# Patient Record
Sex: MALE | Race: OTHER | HISPANIC OR LATINO | Employment: FULL TIME | URBAN - METROPOLITAN AREA
[De-identification: names, ages, dates, MRNs, and addresses within clinical notes are randomized per-mention and may not be internally consistent; named-entity substitution may affect disease eponyms.]

---

## 2018-09-30 ENCOUNTER — HOSPITAL ENCOUNTER (EMERGENCY)
Facility: HOSPITAL | Age: 48
Discharge: HOME | End: 2018-10-01
Attending: EMERGENCY MEDICINE | Admitting: EMERGENCY MEDICINE
Payer: COMMERCIAL

## 2018-09-30 VITALS
SYSTOLIC BLOOD PRESSURE: 177 MMHG | RESPIRATION RATE: 19 BRPM | OXYGEN SATURATION: 97 % | HEART RATE: 88 BPM | DIASTOLIC BLOOD PRESSURE: 81 MMHG | TEMPERATURE: 98.7 F

## 2018-09-30 DIAGNOSIS — G56.20 CUBITAL TUNNEL SYNDROME: Primary | ICD-10-CM

## 2018-09-30 PROCEDURE — 99284 EMERGENCY DEPT VISIT MOD MDM: CPT

## 2018-10-01 ENCOUNTER — APPOINTMENT (EMERGENCY)
Dept: CT IMAGING | Facility: HOSPITAL | Age: 48
End: 2018-10-01
Payer: COMMERCIAL

## 2018-10-01 ENCOUNTER — APPOINTMENT (EMERGENCY)
Dept: RADIOLOGY | Facility: HOSPITAL | Age: 48
End: 2018-10-01
Payer: COMMERCIAL

## 2018-10-01 LAB
ALBUMIN SERPL BCP-MCNC: 3.8 G/DL (ref 3.5–5)
ALP SERPL-CCNC: 82 U/L (ref 46–116)
ALT SERPL W P-5'-P-CCNC: 28 U/L (ref 12–78)
ANION GAP SERPL CALCULATED.3IONS-SCNC: 10 MMOL/L (ref 4–13)
AST SERPL W P-5'-P-CCNC: 19 U/L (ref 5–45)
ATRIAL RATE: 70 BPM
BASOPHILS # BLD AUTO: 0.04 THOUSANDS/ΜL (ref 0–0.1)
BASOPHILS NFR BLD AUTO: 0 % (ref 0–1)
BILIRUB SERPL-MCNC: 0.3 MG/DL (ref 0.2–1)
BUN SERPL-MCNC: 15 MG/DL (ref 5–25)
CALCIUM SERPL-MCNC: 8.7 MG/DL (ref 8.3–10.1)
CHLORIDE SERPL-SCNC: 99 MMOL/L (ref 100–108)
CO2 SERPL-SCNC: 27 MMOL/L (ref 21–32)
CREAT SERPL-MCNC: 0.93 MG/DL (ref 0.6–1.3)
EOSINOPHIL # BLD AUTO: 0.11 THOUSAND/ΜL (ref 0–0.61)
EOSINOPHIL NFR BLD AUTO: 1 % (ref 0–6)
ERYTHROCYTE [DISTWIDTH] IN BLOOD BY AUTOMATED COUNT: 12.6 % (ref 11.6–15.1)
GFR SERPL CREATININE-BSD FRML MDRD: 96 ML/MIN/1.73SQ M
GLUCOSE SERPL-MCNC: 129 MG/DL (ref 65–140)
HCT VFR BLD AUTO: 45.5 % (ref 36.5–49.3)
HGB BLD-MCNC: 15.8 G/DL (ref 12–17)
INR PPP: 1.08 (ref 0.86–1.17)
LYMPHOCYTES # BLD AUTO: 2.47 THOUSANDS/ΜL (ref 0.6–4.47)
LYMPHOCYTES NFR BLD AUTO: 13 % (ref 14–44)
MCH RBC QN AUTO: 28.5 PG (ref 26.8–34.3)
MCHC RBC AUTO-ENTMCNC: 34.7 G/DL (ref 31.4–37.4)
MCV RBC AUTO: 82 FL (ref 82–98)
MONOCYTES # BLD AUTO: 1.18 THOUSAND/ΜL (ref 0.17–1.22)
MONOCYTES NFR BLD AUTO: 6 % (ref 4–12)
NEUTROPHILS # BLD AUTO: 15.02 THOUSANDS/ΜL (ref 1.85–7.62)
NEUTS SEG NFR BLD AUTO: 80 % (ref 43–75)
P AXIS: 50 DEGREES
PLATELET # BLD AUTO: 405 THOUSANDS/UL (ref 149–390)
PMV BLD AUTO: 9.4 FL (ref 8.9–12.7)
POTASSIUM SERPL-SCNC: 3.9 MMOL/L (ref 3.5–5.3)
PR INTERVAL: 146 MS
PROT SERPL-MCNC: 8.5 G/DL (ref 6.4–8.2)
PROTHROMBIN TIME: 13.9 SECONDS (ref 11.8–14.2)
QRS AXIS: 33 DEGREES
QRSD INTERVAL: 76 MS
QT INTERVAL: 372 MS
QTC INTERVAL: 401 MS
RBC # BLD AUTO: 5.54 MILLION/UL (ref 3.88–5.62)
SODIUM SERPL-SCNC: 136 MMOL/L (ref 136–145)
T WAVE AXIS: 44 DEGREES
TROPONIN I SERPL-MCNC: <0.02 NG/ML
TSH SERPL DL<=0.05 MIU/L-ACNC: 2.63 UIU/ML (ref 0.36–3.74)
VENTRICULAR RATE: 70 BPM
WBC # BLD AUTO: 18.82 THOUSAND/UL (ref 4.31–10.16)

## 2018-10-01 PROCEDURE — 70498 CT ANGIOGRAPHY NECK: CPT

## 2018-10-01 PROCEDURE — 73080 X-RAY EXAM OF ELBOW: CPT

## 2018-10-01 PROCEDURE — 84443 ASSAY THYROID STIM HORMONE: CPT | Performed by: EMERGENCY MEDICINE

## 2018-10-01 PROCEDURE — 93010 ELECTROCARDIOGRAM REPORT: CPT | Performed by: INTERNAL MEDICINE

## 2018-10-01 PROCEDURE — 85025 COMPLETE CBC W/AUTO DIFF WBC: CPT | Performed by: EMERGENCY MEDICINE

## 2018-10-01 PROCEDURE — 70496 CT ANGIOGRAPHY HEAD: CPT

## 2018-10-01 PROCEDURE — 86617 LYME DISEASE ANTIBODY: CPT | Performed by: EMERGENCY MEDICINE

## 2018-10-01 PROCEDURE — 36415 COLL VENOUS BLD VENIPUNCTURE: CPT | Performed by: EMERGENCY MEDICINE

## 2018-10-01 PROCEDURE — 84484 ASSAY OF TROPONIN QUANT: CPT | Performed by: EMERGENCY MEDICINE

## 2018-10-01 PROCEDURE — 80053 COMPREHEN METABOLIC PANEL: CPT | Performed by: EMERGENCY MEDICINE

## 2018-10-01 PROCEDURE — 85610 PROTHROMBIN TIME: CPT | Performed by: EMERGENCY MEDICINE

## 2018-10-01 PROCEDURE — 93005 ELECTROCARDIOGRAM TRACING: CPT

## 2018-10-01 RX ORDER — CLINDAMYCIN HYDROCHLORIDE 300 MG/1
300 CAPSULE ORAL 4 TIMES DAILY
Qty: 40 CAPSULE | Refills: 0 | Status: SHIPPED | OUTPATIENT
Start: 2018-10-01 | End: 2018-10-11

## 2018-10-01 RX ORDER — PREDNISONE 20 MG/1
60 TABLET ORAL ONCE
Status: COMPLETED | OUTPATIENT
Start: 2018-10-01 | End: 2018-10-01

## 2018-10-01 RX ORDER — CLINDAMYCIN HYDROCHLORIDE 150 MG/1
300 CAPSULE ORAL ONCE
Status: COMPLETED | OUTPATIENT
Start: 2018-10-01 | End: 2018-10-01

## 2018-10-01 RX ORDER — PREDNISONE 20 MG/1
60 TABLET ORAL DAILY
Qty: 15 TABLET | Refills: 0 | Status: SHIPPED | OUTPATIENT
Start: 2018-10-01 | End: 2018-11-06

## 2018-10-01 RX ORDER — METHOCARBAMOL 500 MG/1
1000 TABLET, FILM COATED ORAL ONCE
Status: COMPLETED | OUTPATIENT
Start: 2018-10-01 | End: 2018-10-01

## 2018-10-01 RX ORDER — METHOCARBAMOL 500 MG/1
1000 TABLET, FILM COATED ORAL 2 TIMES DAILY
Qty: 30 TABLET | Refills: 0 | Status: SHIPPED | OUTPATIENT
Start: 2018-10-01 | End: 2018-11-06

## 2018-10-01 RX ADMIN — CLINDAMYCIN HYDROCHLORIDE 300 MG: 150 CAPSULE ORAL at 03:37

## 2018-10-01 RX ADMIN — PREDNISONE 60 MG: 20 TABLET ORAL at 03:27

## 2018-10-01 RX ADMIN — METHOCARBAMOL 1000 MG: 500 TABLET ORAL at 03:26

## 2018-10-01 RX ADMIN — IOHEXOL 100 ML: 350 INJECTION, SOLUTION INTRAVENOUS at 01:16

## 2018-10-01 NOTE — DISCHARGE INSTRUCTIONS
Cubital Tunnel Syndrome   WHAT YOU NEED TO KNOW:   Cubital tunnel syndrome is a condition where there is increased pressure on the ulnar nerve in your elbow  The ulnar nerve controls muscles and feeling in the hand  Cubital tunnel syndrome may be caused by direct pressure, stretching, or decreased blood flow to the ulnar nerve  DISCHARGE INSTRUCTIONS:   Medicines:   · NSAIDs:  These medicines decrease swelling and pain  NSAIDs are available without a doctor's order  Ask which medicine is right for you and how much to take  Take as directed  NSAIDs can cause stomach bleeding or kidney problems if not taken correctly  · Take your medicine as directed  Contact your healthcare provider if you think your medicine is not helping or if you have side effects  Tell him of her if you are allergic to any medicine  Keep a list of the medicines, vitamins, and herbs you take  Include the amounts, and when and why you take them  Bring the list or the pill bottles to follow-up visits  Carry your medicine list with you in case of an emergency  Follow up with your healthcare provider as directed:  Write down your questions so you remember to ask them during your visits  Manage your symptoms:   · Avoid putting pressure on your elbow:  Certain positions put pressure on the ulnar nerve in your elbow  Leaning or sleeping on your bent elbow can make your symptoms worse  · Apply ice:  Ice helps decrease swelling and pain  Ice may also help prevent tissue damage  Use an ice pack or put crushed ice in a plastic bag  Cover the ice pack with a towel and place it on the area for 15 to 20 minutes every hour  · Rest your arm:  You may need to rest your injured arm and avoid activities that cause your symptoms to allow your nerve to heal     · Get physical therapy:  A physical therapist can show you exercises to help improve movement and strength  Physical therapy can also help decrease pain and loss of function      · Use elbow splint or brace: You may need a brace or splint on your elbow to decrease your arm movement  This will help to keep pressure off your ulnar nerve  You may also need elbow pads to protect your elbow  Contact your healthcare provider if:   · Your symptoms get worse  · Your hand and fingers are so weak that you cannot grab, squeeze, or lift items  · You have questions or concerns about your condition or care  Return to the emergency department if:   · You suddenly lose feeling in your hand or fingers  · You cannot move your ring or little finger  © 2017 [facility] Information is for End User's use only and may not be sold, redistributed or otherwise used for commercial purposes  All illustrations and images included in CareNotes® are the copyrighted property of [employer]  or Navneet Garcia  The above information is an  only  It is not intended as medical advice for individual conditions or treatments  Talk to your doctor, nurse or pharmacist before following any medical regimen to see if it is safe and effective for you

## 2018-10-01 NOTE — ED PROVIDER NOTES
History  Chief Complaint   Patient presents with    Arm Pain     pt co of R arm pain "i woke up with arm pain on saturday morning" no injury  41-year-old male patient, working as a , presents to the emergency department for evaluation of right arm pain  Patient states arm pain  Patient has tenderness over the right elbow  There is no joint effusion, there is no overlying cellulitis, there is no known numbness, there is some tingling in the extremity  Patient is afebrile, has normal vital signs, is otherwise well-appearing  Of note, however, on physical exam the patient does have a mild right facial droop or facial asymmetry  The patient's wife 20 years states that she is not sure if this is new or old  She states looking at him that she was unaware that that was there and does notice that now  The patient has no other findings neurologically, has an NIH stroke scale of one based on this  The patient's symptoms started a week ago  The patient has tenderness over the elbow, has no joint effusion which is palpable, has no surrounding cellulitis  Patient does not examine as a septic joint  CT scan of the head and neck will be done because of the facial droop my concern at this elbow finding which she examines like cubital tunnel syndrome may be overlying another cause of right hand weakness  CT scan found no acute abnormalities indicative of a previous stroke  By this time, with the duration of symptoms, I would expect that would have been found there  The patient does live in Lyme endemic area and because of this a Lyme titer was done  Patient has an elevated white count of [id number]  The CT scan does show some dental infection, it also shows a soft tissue mass in the chest which I did speak to the patient about  The patient had no primary care follow-up so he was given the name of a [address] [place] [address] provider who is accepting patients    I explained to him the importance of following up with them for these findings as they may indicate other disease processes such as cancer  The patient will be started on Robaxin and prednisone for the cubital tunnel syndrome and clindamycin for the dental infection  History provided by:  Patient   used: No    Elbow Pain   Location:  Elbow  Elbow location:  R elbow  Injury: no    Pain details:     Quality:  Aching    Radiates to:  Does not radiate    Severity:  Moderate    Onset quality:  Gradual    Timing:  Constant    Progression:  Worsening  Handedness:  Right-handed  Dislocation: no    Foreign body present:  No foreign bodies  Prior injury to area:  No  Relieved by:  Nothing  Worsened by:  Nothing  Ineffective treatments:  None tried      None       History reviewed  No pertinent past medical history  History reviewed  No pertinent surgical history  History reviewed  No pertinent family history  I have reviewed and agree with the history as documented  Social History   Substance Use Topics    Smoking status: Current Every Day Smoker     Packs/day: 0 50     Types: Cigarettes    Smokeless tobacco: Never Used    Alcohol use Yes        Review of Systems   All other systems reviewed and are negative  Physical Exam  Physical Exam   Constitutional: He is oriented to person, place, and time  He appears well-developed and well-nourished  No distress  HENT:   Head: Normocephalic and atraumatic  Right Ear: External ear normal    Left Ear: External ear normal    Eyes: Conjunctivae and EOM are normal  Right eye exhibits no discharge  Left eye exhibits no discharge  No scleral icterus  Neck: Normal range of motion  Neck supple  No JVD present  No tracheal deviation present  No thyromegaly present  Cardiovascular: Normal rate and regular rhythm  Pulmonary/Chest: Effort normal and breath sounds normal  No stridor  No respiratory distress  He has no wheezes  He has no rales  Abdominal: Soft   Bowel sounds are normal  He exhibits no distension  There is no tenderness  Musculoskeletal: He exhibits no edema or deformity  Right elbow: He exhibits decreased range of motion and swelling  He exhibits no effusion, no deformity and no laceration  Tenderness found  Neurological: He is alert and oriented to person, place, and time  A cranial nerve deficit (as noted  pt not sure if old or new, neither is his wife  pt has no other abnormalities on physical exam ) is present  Coordination normal  GCS eye subscore is 4  GCS verbal subscore is 5  GCS motor subscore is 6  Skin: Skin is warm and dry  He is not diaphoretic  Psychiatric: He has a normal mood and affect  His behavior is normal    Nursing note and vitals reviewed        Vital Signs  ED Triage Vitals [09/30/18 2239]   Temperature Pulse Respirations Blood Pressure SpO2   98 7 °F (37 1 °C) 88 19 (!) 177/81 97 %      Temp Source Heart Rate Source Patient Position - Orthostatic VS BP Location FiO2 (%)   Oral Monitor Sitting Right arm --      Pain Score       Worst Possible Pain           Vitals:    09/30/18 2239   BP: (!) 177/81   Pulse: 88   Patient Position - Orthostatic VS: Sitting       Visual Acuity  Visual Acuity      Most Recent Value   L Pupil Size (mm)  3   R Pupil Size (mm)  3          ED Medications  Medications   iohexol (OMNIPAQUE) 350 MG/ML injection (MULTI-DOSE) 100 mL (100 mL Intravenous Given 10/1/18 0116)   methocarbamol (ROBAXIN) tablet 1,000 mg (1,000 mg Oral Given 10/1/18 0326)   predniSONE tablet 60 mg (60 mg Oral Given 10/1/18 0327)   clindamycin (CLEOCIN) capsule 300 mg (300 mg Oral Given 10/1/18 0337)       Diagnostic Studies  Results Reviewed     Procedure Component Value Units Date/Time    Troponin I [[id number]]  (Normal) Collected:  10/01/18 0017    Lab Status:  Final result Specimen:  Blood from Arm, Left Updated:  10/01/18 0057     Troponin I <0 02 ng/mL     TSH, 3rd generation with Free T4 reflex [[id number]]  (Normal) Collected:  10/01/18 0017 Lab Status:  Final result Specimen:  Blood from Arm, Right Updated:  10/01/18 0055     TSH 3RD GENERATON 2 629 uIU/mL     Narrative:         Patients undergoing fluorescein dye angiography may retain small amounts of fluorescein in the body for 48-72 hours post procedure  Samples containing fluorescein can produce falsely depressed TSH values  If the patient had this procedure,a specimen should be resubmitted post fluorescein clearance  Comprehensive metabolic panel [[id number]]  (Abnormal) Collected:  10/01/18 0017    Lab Status:  Final result Specimen:  Blood from Arm, Right Updated:  10/01/18 0047     Sodium 136 mmol/L      Potassium 3 9 mmol/L      Chloride 99 (L) mmol/L      CO2 27 mmol/L      ANION GAP 10 mmol/L      BUN 15 mg/dL      Creatinine 0 93 mg/dL      Glucose 129 mg/dL      Calcium 8 7 mg/dL      AST 19 U/L      ALT 28 U/L      Alkaline Phosphatase 82 U/L      Total Protein 8 5 (H) g/dL      Albumin 3 8 g/dL      Total Bilirubin 0 30 mg/dL      eGFR 96 ml/min/1 73sq m     Narrative:         National Kidney Disease Education Program recommendations are as follows:  GFR calculation is accurate only with a steady state creatinine  Chronic Kidney disease less than 60 ml/min/1 73 sq  meters  Kidney failure less than 15 ml/min/1 73 sq  meters  Protime-INR [[id number]]  (Normal) Collected:  10/01/18 0017    Lab Status:  Final result Specimen:  Blood from Arm, Right Updated:  10/01/18 0042     Protime 13 9 seconds      INR 1 08    Lyme disease, western blot [[id number]] Collected:  10/01/18 0036    Lab Status:   In process Specimen:  Blood from Arm, Left Updated:  10/01/18 0039    CBC and differential [[id number]]  (Abnormal) Collected:  10/01/18 0017    Lab Status:  Final result Specimen:  Blood from Arm, Right Updated:  10/01/18 0031     WBC 18 82 (H) Thousand/uL      RBC 5 54 Million/uL      Hemoglobin 15 8 g/dL      Hematocrit 45 5 %      MCV 82 fL      MCH 28 5 pg      MCHC 34 7 g/dL      RDW 12 6 % MPV 9 4 fL      Platelets 589 (H) Thousands/uL      Neutrophils Relative 80 (H) %      Lymphocytes Relative 13 (L) %      Monocytes Relative 6 %      Eosinophils Relative 1 %      Basophils Relative 0 %      Neutrophils Absolute 15 02 (H) Thousands/µL      Lymphocytes Absolute 2 47 Thousands/µL      Monocytes Absolute 1 18 Thousand/µL      Eosinophils Absolute 0 11 Thousand/µL      Basophils Absolute 0 04 Thousands/µL                  CTA head and neck with and without contrast   Final Result by Maryann Hernandez MD (10/01 0303)      1  No evidence of acute intracranial hemorrhage  2   Mild atherosclerosis, as described above  No evidence of hemodynamically significant stenosis  No evidence of aneurysm or AVM  3   There is an approximately 0 9 x 0 7 cm soft tissue density structure within the posterior aspect of the right mainstem bronchus, just beyond the marisa  Although this could be due to mucous or secretions, a soft tissue nodule should be excluded  Follow-up is suggested  4   There is some peripheral subpleural honeycombing and interstitial thickening laterally at the right lung apex  Nonemergent outpatient pulmonology consultation/follow-up is recommended  5   There is dental and periodontal disease  There is an approximately 2 cm lucent lesion with a faint sclerotic margin involving the anterior maxilla, slightly to the right of midline  Nonemergent outpatient dental consultation/follow-up is    recommended           Workstation performed: [id number]         XR elbow 3+ vw RIGHT    (Results Pending)              Procedures  Procedures       Phone Contacts  ED Phone Contact    ED Course                               MDM  Number of Diagnoses or Management Options  Cubital tunnel syndrome: new and requires workup     Amount and/or Complexity of Data Reviewed  Clinical lab tests: reviewed and ordered  Tests in the radiology section of CPT®: ordered and reviewed  Decide to obtain previous medical records or to obtain history from someone other than the patient: yes  Review and summarize past medical records: yes    Patient Progress  Patient progress: stable    CritCare Time    Disposition  Final diagnoses:   Cubital tunnel syndrome     Time reflects when diagnosis was documented in both MDM as applicable and the Disposition within this note     Time User Action Codes Description Comment    10/1/2018  3:22 AM Bel Ruiz Add [G56 20] Cubital tunnel syndrome       ED Disposition     ED Disposition Condition Comment    Discharge  Osmin Simonsin discharge to home/self care  Condition at discharge: Stable        Follow-up Information     Follow up With Specialties Details Why Contact Info Additional Information    [facility] Emergency Medicine  As needed [facility]  [id number] [place] ED, [address], [place], [address] [place] [address], [address] [place] [address], Nurse Practitioner  Discuss CT scan finding of pulmonary abnormalities and Dental disease  [address]             Discharge Medication List as of 10/1/2018  3:23 AM      START taking these medications    Details   methocarbamol (ROBAXIN) 500 mg tablet Take 2 tablets (1,000 mg total) by mouth 2 (two) times a day, Starting Mon 10/1/2018, Print      predniSONE 20 mg tablet Take 3 tablets (60 mg total) by mouth daily, Starting Mon 10/1/2018, Print             Outpatient Discharge Orders  Ambulatory referral to Physical Therapy   Standing Status: Future  Standing Exp   Date: 04/01/19         ED Provider  Electronically Signed by           Jessica Howard DO  10/01/18 3929

## 2018-10-02 LAB
B BURGDOR IGG PATRN SER IB-IMP: NEGATIVE
B BURGDOR IGM PATRN SER IB-IMP: POSITIVE
B BURGDOR18KD IGG SER QL IB: ABNORMAL
B BURGDOR23KD IGG SER QL IB: ABNORMAL
B BURGDOR23KD IGM SER QL IB: PRESENT
B BURGDOR28KD IGG SER QL IB: ABNORMAL
B BURGDOR30KD IGG SER QL IB: ABNORMAL
B BURGDOR39KD IGG SER QL IB: ABNORMAL
B BURGDOR39KD IGM SER QL IB: PRESENT
B BURGDOR41KD IGG SER QL IB: ABNORMAL
B BURGDOR41KD IGM SER QL IB: PRESENT
B BURGDOR45KD IGG SER QL IB: ABNORMAL
B BURGDOR58KD IGG SER QL IB: ABNORMAL
B BURGDOR66KD IGG SER QL IB: ABNORMAL
B BURGDOR93KD IGG SER QL IB: ABNORMAL

## 2018-10-03 ENCOUNTER — EVALUATION (OUTPATIENT)
Dept: OCCUPATIONAL THERAPY | Facility: CLINIC | Age: 48
End: 2018-10-03
Payer: COMMERCIAL

## 2018-10-03 ENCOUNTER — OFFICE VISIT (OUTPATIENT)
Dept: FAMILY MEDICINE CLINIC | Facility: CLINIC | Age: 48
End: 2018-10-03
Payer: COMMERCIAL

## 2018-10-03 VITALS
TEMPERATURE: 97.6 F | OXYGEN SATURATION: 98 % | HEART RATE: 73 BPM | BODY MASS INDEX: 23.47 KG/M2 | HEIGHT: 69 IN | SYSTOLIC BLOOD PRESSURE: 162 MMHG | RESPIRATION RATE: 18 BRPM | DIASTOLIC BLOOD PRESSURE: 88 MMHG | WEIGHT: 158.5 LBS

## 2018-10-03 DIAGNOSIS — R73.9 HYPERGLYCEMIA: ICD-10-CM

## 2018-10-03 DIAGNOSIS — F17.210 CIGARETTE NICOTINE DEPENDENCE WITHOUT COMPLICATION: ICD-10-CM

## 2018-10-03 DIAGNOSIS — G56.21 CUBITAL TUNNEL SYNDROME ON RIGHT: Primary | ICD-10-CM

## 2018-10-03 DIAGNOSIS — Z12.5 PROSTATE CANCER SCREENING: ICD-10-CM

## 2018-10-03 DIAGNOSIS — Z76.89 ENCOUNTER TO ESTABLISH CARE: Primary | ICD-10-CM

## 2018-10-03 DIAGNOSIS — R93.89 ABNORMAL CHEST CT: ICD-10-CM

## 2018-10-03 DIAGNOSIS — G56.21 CUBITAL TUNNEL SYNDROME ON RIGHT: ICD-10-CM

## 2018-10-03 DIAGNOSIS — I10 ESSENTIAL HYPERTENSION: ICD-10-CM

## 2018-10-03 DIAGNOSIS — R22.2 MASS IN CHEST: ICD-10-CM

## 2018-10-03 DIAGNOSIS — M25.521 RIGHT ELBOW PAIN: ICD-10-CM

## 2018-10-03 DIAGNOSIS — D72.828 OTHER ELEVATED WHITE BLOOD CELL (WBC) COUNT: ICD-10-CM

## 2018-10-03 DIAGNOSIS — Z11.59 NEED FOR HEPATITIS C SCREENING TEST: ICD-10-CM

## 2018-10-03 DIAGNOSIS — K05.6 PERIODONTAL DISEASE: ICD-10-CM

## 2018-10-03 DIAGNOSIS — Z86.39 HISTORY OF HYPERLIPIDEMIA: ICD-10-CM

## 2018-10-03 PROBLEM — D72.829 LEUCOCYTOSIS: Status: ACTIVE | Noted: 2018-10-03

## 2018-10-03 PROCEDURE — 97166 OT EVAL MOD COMPLEX 45 MIN: CPT

## 2018-10-03 PROCEDURE — G0283 ELEC STIM OTHER THAN WOUND: HCPCS

## 2018-10-03 PROCEDURE — G8985 CARRY GOAL STATUS: HCPCS

## 2018-10-03 PROCEDURE — 99204 OFFICE O/P NEW MOD 45 MIN: CPT | Performed by: NURSE PRACTITIONER

## 2018-10-03 PROCEDURE — 97014 ELECTRIC STIMULATION THERAPY: CPT

## 2018-10-03 PROCEDURE — G8984 CARRY CURRENT STATUS: HCPCS

## 2018-10-03 PROCEDURE — 97535 SELF CARE MNGMENT TRAINING: CPT

## 2018-10-03 RX ORDER — AMLODIPINE BESYLATE 5 MG/1
5 TABLET ORAL DAILY
Qty: 30 TABLET | Refills: 2 | Status: SHIPPED | OUTPATIENT
Start: 2018-10-03 | End: 2018-11-06 | Stop reason: SDUPTHER

## 2018-10-03 NOTE — LETTER
October 3, 2018     Patient: Carleen Lane   YOB: 1970   Date of Visit: 10/3/2018       To Whom it May Concern:    Carleen Lane is under my professional care  He was seen in my office on 10/3/2018  He may return to work on 10-4-18  He will return to work with light duty restrictions  He currently improving with physical therapy, however, will be making gradual progress  At this time, his light duty will no heavy lifting  If you have any questions or concerns, please don't hesitate to call           Sincerely,          NATALYA Armas        CC: No Recipients

## 2018-10-03 NOTE — PATIENT INSTRUCTIONS
Cubital tunnel syndrome-continue with Phys therapy  Returning to work tomorrow on [employer] duty  Apply brace (over the counter) for next 3-4 weeks to relieve pressure on elbow  Ice for comfort  Check uric acid to be sure this is not gout  Of note, pt did stop daily alcohol consumption three days ago  Abnormal chest xray- mass possibly behind left main stem- Refer to Pulmonary  DR Fabiana Rodriguez  Nicotine addiction- he would like to quit smoking  Willing to try the nicotine patch  Dental disease- take antibiotics until completion  Follow up with dentist ASAP  Check CBC with diff after medication completed  WBC were 18 prior to antibioitcs  Check PSA  H/O hyperlipidemia- check current labs  Encourage 5 servings of fruits and veggies daily  Hypertension- limit salt in diet  Start medication as ordered  Check Hepatitis c screening by bloodwork      DASH Eating Plan   WHAT YOU NEED TO KNOW:   What is the DASH Eating Plan? The DASH (Dietary Approaches to Stop Hypertension) Eating Plan is designed to help prevent or lower high blood pressure  It can also help to lower LDL (bad) cholesterol and decrease your risk for heart disease  The plan is low in sodium, sugar, unhealthy fats, and total fat  It is high in potassium, calcium, magnesium, and fiber  These nutrients are added when you eat more fruits, vegetables, and whole grains  What is my sodium limit for each day? Your dietitian will tell you how much sodium is safe for you to have each day  People with high blood pressure should have no more than 1,500 to 2,300 mg of sodium in a day  A teaspoon (tsp) of salt has 2,300 mg of sodium  This may seem like a difficult goal, but small changes to the foods you eat can make a big difference  Your healthcare provider or dietitian can help you create a meal plan that follows your sodium limit  How do I limit sodium? · Read food labels  Food labels can help you choose foods that are low in sodium   The amount of sodium is listed in milligrams (mg)  The % Daily Value (DV) column tells you how much of your daily needs are met by 1 serving of the food for each nutrient listed  Choose foods that have less than 5% of the DV of sodium  These foods are considered low in sodium  Foods that have 20% or more of the DV of sodium are considered high in sodium  Avoid foods that have more than 300 mg of sodium in each serving  Choose foods that say low-sodium, reduced-sodium, or no salt added on the food label  · Avoid salt  Do not salt food at the table, and add very little salt to foods during cooking  Use herbs and spices, such as onions, garlic, and salt-free seasonings to add flavor to foods  Try lemon or lime juice or vinegar to give foods a tart flavor  Use hot peppers or a small amount of hot pepper sauce to add a spicy flavor to foods  · Ask about salt substitutes  Ask your healthcare provider if you may use salt substitutes  Some salt substitutes have ingredients that can be harmful if you have certain health conditions  · Choose foods carefully at restaurants  Meals from restaurants, especially fast food restaurants, are often high in sodium  Some restaurants have nutrition information that tells you the amount of sodium in their foods  Ask to have your food prepared with less, or no salt  What should I know about fats? · Include healthy fats  Examples are unsaturated fats and omega-3 fatty acids  Unsaturated fats are found in soybean, canola, olive, or sunflower oil, and liquid and soft tub margarines  Omega-3 fatty acids are found in fatty fish, such as salmon, tuna, mackerel, and sardines  It is also found in flaxseed oil and ground flaxseed  · Avoid unhealthy fats  Do not eat unhealthy fats, such as saturated fats and trans fats  Saturated fats are found in foods that contain fat from animals  Examples are fatty meats, whole milk, butter, cream, and other dairy foods   It is also found in shortening, stick margarine, palm oil, and coconut oil  Trans fats are found in fried foods, crackers, chips, and baked goods made with margarine or shortening  Which foods should I include? With the DASH eating plan, you need to eat a certain number of servings from each food group  This will help you get enough of certain nutrients and limit others  The amount of servings you should eat depends on how many calories you need  Your dietitian can tell you how many calories you need  The number of servings listed next to the food groups below are for people who need about 2,000 calories each day    · Grains:  6 to 8 servings (3 of these servings should be whole-grain foods)    ¨ 1 slice of whole-grain bread     ¨ 1 ounce of dry cereal    ¨ ½ cup of cooked cereal, pasta, or brown rice    · Vegetables and fruits:  4 to 5 servings of fruits and 4 to 5 servings of vegetables    ¨ 1 medium fruit    ¨ 1 cup of raw leafy vegetable    ¨ ½ cup of frozen, canned (no added salt), or chopped fresh vegetables     ¨ ½ cup of fresh, frozen, dried, or canned fruit (canned in light syrup or fruit juice)    ¨ ½ cup of vegetable or fruit juice    · Dairy:  2 to 3 servings    ¨ 1 cup of nonfat (skim) or 1% milk    ¨ 1½ ounces of fat-free or low-fat, low-sodium cheese    ¨ 6 ounces of nonfat or low-fat yogurt    · Lean meat, poultry, and fish:  6 ounces or less    [employer] (chicken, turkey) with no skin    ¨ Fish (especially fatty fish, such as salmon, fresh tuna, or mackerel)    ¨ Lean beef and pork (loin, round, extra lean hamburger)    ¨ Egg whites and egg substitutes    · Nuts, seeds, and legumes:  4 to 5 servings each week    ¨ ½ cup of cooked beans and peas    ¨ 1½ ounces of unsalted nuts    ¨ 2 tablespoons of peanut butter or seeds    · Sweets and added sugars:  5 or less each week    ¨ 1 tablespoon of sugar, jelly, or jam    ¨ ½ cup of sorbet or gelatin    ¨ 1 cup of lemonade    · Fats:  2 to 3 servings each week    ¨ 1 teaspoon of soft margarine or vegetable oil    ¨ 1 tablespoon of mayonnaise    ¨ 2 tablespoons of salad dressing  Which foods should I avoid?    · Grains:      [employer], such as doughnuts, pastries, cookies, and biscuits (high in fat and sugar)    ¨ Mixes for cornbread and biscuits, packaged foods, such as bread stuffing, rice and pasta mixes, macaroni and cheese, and instant cereals (high in sodium)    · Fruits and vegetables:      ¨ Regular, canned vegetables (high in sodium)    ¨ Sauerkraut, pickled vegetables, and other foods prepared in brine (high in sodium)    ¨ Fried vegetables or vegetables in butter or high-fat sauces    ¨ Fruit in cream or butter sauce (high in fat)    · Dairy:      ¨ Whole milk, 2% milk, and cream (high in fat)    ¨ Regular cheese and processed cheese (high in fat and sodium)    · Meats and protein foods:      ¨ Smoked or cured meat, such as corned beef, arenas, ham, hot dogs, and sausage (high in fat and sodium)    ¨ Canned beans and canned meats or spreads, such as potted meats, sardines, anchovies, and imitation seafood (high in sodium)    ¨ Deli or lunch meats, such as bologna, ham, turkey, and roast beef (high in sodium)    ¨ High-fat meat (T-bone steak, regular hamburger, and ribs)    ¨ Whole eggs and egg yolks (high in fat)    · Other:      ¨ Seasonings made with salt, such as garlic salt, celery salt, onion salt, seasoned salt, meat tenderizers, and monosodium glutamate (MSG)    ¨ Miso soup and canned or dried soup mixes (high in sodium)    ¨ Regular soy sauce, barbecue sauce, teriyaki sauce, steak sauce, Worcestershire sauce, and most flavored vinegars (high in sodium)    ¨ Regular condiments, such as mustard, ketchup, and salad dressings (high in sodium)    ¨ Gravy and sauces, such as Onesimo or cheese sauces (high in sodium and fat)    ¨ Drinks high in sugar, such as soda or fruit drinks    [employer] foods, such as salted chips, popcorn, pretzels, pork rinds, salted crackers, and salted nuts    ¨ Frozen foods, such as dinners, entrees, vegetables with sauces, and breaded meats (high in sodium)  What other guidelines should I follow? · Maintain a healthy weight  Your risk for heart disease is higher if you are overweight  Your healthcare provider may suggest that you lose weight if you are overweight  You can lose weight by eating fewer calories and foods that have added sugars and fat  The DASH meal plan can help you do this  Decrease calories by eating smaller portions at each meal and fewer snacks  Ask your healthcare provider for more information about how to lose weight  · Exercise regularly  Regular exercise can help you reach or maintain a healthy weight  Regular exercise can also help decrease your blood pressure and improve your cholesterol levels  Get 30 minutes or more of moderate exercise each day of the week  To lose weight, get at least 60 minutes of exercise  Talk to your healthcare provider about the best exercise program for you  · Limit alcohol  Women should limit alcohol to 1 drink a day  Men should limit alcohol to 2 drinks a day  A drink of alcohol is 12 ounces of beer, 5 ounces of wine, or 1½ ounces of liquor  Where can I find more information? · [facility] and [address]  [address] [address]  [place] [address]  Phone: [phone]  Web Address: [facility] no  [facility] AGREEMENT:   You have the right to help plan your care  Discuss treatment options with your caregivers to decide what care you want to receive  You always have the right to refuse treatment  The above information is an  only  It is not intended as medical advice for individual conditions or treatments  Talk to your doctor, nurse or pharmacist before following any medical regimen to see if it is safe and effective for you    © 2017 [employer] Information is for End User's use only and may not be sold, redistributed or otherwise used for commercial purposes  All illustrations and images included in CareNotes® are the copyrighted property of A D A M , Inc  or Navneet Garcia    Follow up in one month
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
## 2018-10-03 NOTE — PROGRESS NOTES
Assessment/Plan:    No problem-specific Assessment & Plan notes found for this encounter  Diagnoses and all orders for this visit:    Encounter to establish care    Periodontal disease    Abnormal chest CT  -     Ambulatory referral to Pulmonology; Future    Mass in chest  -     Ambulatory referral to Pulmonology; Future    Cigarette nicotine dependence without complication    Cubital tunnel syndrome on right    Essential hypertension  -     amLODIPine (NORVASC) 5 mg tablet; Take 1 tablet (5 mg total) by mouth daily for 30 days    Other elevated white blood cell (WBC) count  -     CBC and differential; Future    Right elbow pain  -     CBC and differential; Future  -     Uric acid; Future    History of hyperlipidemia  -     Lipid panel; Future    Prostate cancer screening  -     PSA, Total Screen; Future    Need for hepatitis C screening test  -     Hepatitis C antibody; Future    Hyperglycemia  -     Hemoglobin A1C; Future          Subjective:      Patient ID: Sebastian Cosby is a 51 y o  male  Pt is here to Establish care  he was seen in the ER on 9/30, when he presented with right arm pain and tenderness of elbow  At that time, he was noted to have right side facial droop  CTA of the brain was normal  WBC were elevated and he was also found to have dental infection and so he was started on antibiotics  Ct chest was abnormal as follows: No evidence of acute intracranial hemorrhage      2   Mild atherosclerosis, as described above  No evidence of hemodynamically significant stenosis  No evidence of aneurysm or AVM     3  There is an approximately 0 9 x 0 7 cm soft tissue density structure within the posterior aspect of the right mainstem bronchus, just beyond the marisa  Although this could be due to mucous or secretions, a soft tissue nodule should be excluded  Follow-up is suggested      4    There is some peripheral subpleural honeycombing and interstitial thickening laterally at the right lung apex  Nonemergent outpatient pulmonology consultation/follow-up is recommended      5  There is dental and periodontal disease  There is an approximately 2 cm lucent lesion with a faint sclerotic margin involving the anterior maxilla, slightly to the right of midline  Nonemergent outpatient dental consultation/follow-up is   Recommended  Pt has history of recurrent abscess right upper dentition, this has been ongoing to 20 years  The abscess was initially treated by dentist 20 years ago, but since then, the abscess occurs intermittently and then "pops on its own"  He is currently taking Clindamycin, prescribed for 7 days   Right cubital syndrome- treated with prednisone and muscle relaxant  Pt reports much imprvmnt in pain and significant decrease in swelling  He had his first Phys therapy session today  Pt is supposed to return to work tomorrow , light duty  He is a , but carries objects as heavy as 80 pounds  Pt has history of drinking >2 beers a day  He has stopped since starting these medications  The following portions of the patient's history were reviewed and updated as appropriate:   He  has no past medical history on file  He   Patient Active Problem List    Diagnosis Date Noted    Prostate cancer screening 10/03/2018    Periodontal disease 10/03/2018    Abnormal chest CT 10/03/2018    Mass in chest 10/03/2018    Cigarette nicotine dependence without complication 21/66/8237    Cubital tunnel syndrome on right 10/03/2018    Essential hypertension 10/03/2018    Leucocytosis 10/03/2018    Right elbow pain 10/03/2018    History of hyperlipidemia 10/03/2018    Need for hepatitis C screening test 10/03/2018    Hyperglycemia 10/03/2018     He  has no past surgical history on file  His family history is not on file  He  reports that he has been smoking Cigarettes  He has been smoking about 0 50 packs per day   He has never used smokeless tobacco  He reports that he drinks about 8 4 oz of alcohol per week   He reports that he does not use drugs  Current Outpatient Prescriptions   Medication Sig Dispense Refill    amLODIPine (NORVASC) 5 mg tablet Take 1 tablet (5 mg total) by mouth daily for 30 days 30 tablet 2    clindamycin (CLEOCIN) 300 MG capsule Take 1 capsule (300 mg total) by mouth 4 (four) times a day for 10 days 40 capsule 0    methocarbamol (ROBAXIN) 500 mg tablet Take 2 tablets (1,000 mg total) by mouth 2 (two) times a day 30 tablet 0    predniSONE 20 mg tablet Take 3 tablets (60 mg total) by mouth daily 15 tablet 0     No current facility-administered medications for this visit  Current Outpatient Prescriptions on File Prior to Visit   Medication Sig    clindamycin (CLEOCIN) 300 MG capsule Take 1 capsule (300 mg total) by mouth 4 (four) times a day for 10 days    methocarbamol (ROBAXIN) 500 mg tablet Take 2 tablets (1,000 mg total) by mouth 2 (two) times a day    predniSONE 20 mg tablet Take 3 tablets (60 mg total) by mouth daily     No current facility-administered medications on file prior to visit  He has No Known Allergies       Review of Systems   Constitutional: Negative for fatigue and fever  HENT: Positive for dental problem  Negative for congestion  Recurrent dental abscess   Eyes: Negative for visual disturbance  Respiratory: Negative  Negative for cough, shortness of breath and wheezing  Cardiovascular: Negative  Gastrointestinal: Negative  Musculoskeletal:        Right arm and elbow pain, imprved with prednisone   Skin: Negative  Negative for rash  Allergic/Immunologic: Negative for immunocompromised state  Neurological: Negative for tremors, weakness, light-headedness, numbness and headaches  Hematological: Negative for adenopathy  All other systems reviewed and are negative          Objective:      /88 (BP Location: Left arm, Patient Position: Sitting)   Pulse 73   Temp 97 6 °F (36 4 °C)   Resp 18  5' 9" (1 753 m)   Wt 71 9 kg (158 lb 8 oz)   SpO2 98%   BMI 23 41 kg/m²          Physical Exam   Constitutional: He is oriented to person, place, and time  He appears well-developed and well-nourished  No distress  HENT:   Head: Normocephalic and atraumatic  Right Ear: External ear normal    Left Ear: External ear normal    Nose: Nose normal    Mouth/Throat: Oropharynx is clear and moist  No oropharyngeal exudate  Poor dentition   Eyes: Pupils are equal, round, and reactive to light  Conjunctivae are normal  Right eye exhibits no discharge  Left eye exhibits no discharge  Neck: Normal range of motion  Neck supple  No JVD present  No thyromegaly present  Cardiovascular: Normal rate, regular rhythm, normal heart sounds and intact distal pulses  Exam reveals no gallop and no friction rub  No murmur heard  Pulmonary/Chest: Effort normal and breath sounds normal  No respiratory distress  He has no wheezes  Abdominal: Soft  Bowel sounds are normal  He exhibits no distension  There is no tenderness  Musculoskeletal: Normal range of motion  He exhibits no edema or deformity  Lymphadenopathy:     He has no cervical adenopathy  Neurological: He is alert and oriented to person, place, and time  He exhibits normal muscle tone  Coordination normal    Skin: Skin is warm and dry  No rash noted  He is not diaphoretic  No erythema  Psychiatric: He has a normal mood and affect  His behavior is normal  Judgment and thought content normal    Nursing note and vitals reviewed

## 2018-10-05 ENCOUNTER — TELEPHONE (OUTPATIENT)
Dept: FAMILY MEDICINE CLINIC | Facility: CLINIC | Age: 48
End: 2018-10-05

## 2018-10-05 ENCOUNTER — TELEPHONE (OUTPATIENT)
Dept: PULMONOLOGY | Facility: CLINIC | Age: 48
End: 2018-10-05

## 2018-10-05 NOTE — TELEPHONE ENCOUNTER
Wife called and said she made an appt for the Pulmonologist that you recommended and they can not get him in till Dec 21st  She was wondering if you wanted him to keep that appt or go somewhere else

## 2018-10-08 ENCOUNTER — OFFICE VISIT (OUTPATIENT)
Dept: OCCUPATIONAL THERAPY | Facility: CLINIC | Age: 48
End: 2018-10-08
Payer: COMMERCIAL

## 2018-10-08 DIAGNOSIS — G56.21 CUBITAL TUNNEL SYNDROME ON RIGHT: Primary | ICD-10-CM

## 2018-10-08 PROCEDURE — G0283 ELEC STIM OTHER THAN WOUND: HCPCS

## 2018-10-08 PROCEDURE — 97014 ELECTRIC STIMULATION THERAPY: CPT

## 2018-10-08 PROCEDURE — 97110 THERAPEUTIC EXERCISES: CPT

## 2018-10-08 PROCEDURE — 97140 MANUAL THERAPY 1/> REGIONS: CPT

## 2018-10-08 NOTE — PROGRESS NOTES
Daily Note     Today's date: 10/8/2018  Patient name: Osmin Gonzalez  : 1970  MRN: [id number]  Referring provider: Wallene Fleischer, DO  Dx:   Encounter Diagnosis     ICD-10-CM    1  Cubital tunnel syndrome on right G56 21        Subjective: "I don't have any pain "      Objective: See treatment diary below    Specialty Daily Treatment Diary     Manual  10/03/2018 10/08/2018      [place] GT 4#  5 Min      Wrist Stretches  30 sec x 3      Forearm Stretches  30 sec x 3      Ulnar Nerve Glides  X 10                  Exercise Diary  10/03/2018 10/08/2018      Wrist Flexion  X 20      Pronation  X 20      Ulnar Nerve Glides  3 Planes x 10      Digi-Flex  Blue x 20      Theraputty Grasps        Theraputty Intrinsic Pushes  Yellow x 20      T-Band        Free Weight        UBE Machine                                                                                                    Modalities 10/03/2018 10/08/2018      Ultrasound        E-STIM w/ CP 15 Min 15 Min                    Assessment: Tolerated treatment well  Patient exhibited good technique with therapeutic exercises and would benefit from continued OT  Patient reports only residual symptom is sensitivity to weight bearing on elbow  Plan: Continue per plan of care  Progress treatment as tolerated

## 2018-10-09 ENCOUNTER — TELEPHONE (OUTPATIENT)
Dept: FAMILY MEDICINE CLINIC | Facility: CLINIC | Age: 48
End: 2018-10-09

## 2018-10-09 NOTE — TELEPHONE ENCOUNTER
Returning a call from [facility]  They are unable to get appointment before 11/6 with the pulmonologist   He is scheduled for 12/21  They aren't about if need to keep the 11/6 appointment with us or when to reschedule

## 2018-10-15 ENCOUNTER — APPOINTMENT (OUTPATIENT)
Dept: OCCUPATIONAL THERAPY | Facility: CLINIC | Age: 48
End: 2018-10-15
Payer: COMMERCIAL

## 2018-10-17 ENCOUNTER — APPOINTMENT (OUTPATIENT)
Dept: OCCUPATIONAL THERAPY | Facility: CLINIC | Age: 48
End: 2018-10-17
Payer: COMMERCIAL

## 2018-10-18 DIAGNOSIS — A69.20 LYME DISEASE: Primary | ICD-10-CM

## 2018-10-18 RX ORDER — DOXYCYCLINE HYCLATE 100 MG/1
100 CAPSULE ORAL EVERY 12 HOURS SCHEDULED
Qty: 60 CAPSULE | Refills: 0 | Status: SHIPPED | OUTPATIENT
Start: 2018-10-18 | End: 2018-11-17

## 2018-10-18 NOTE — QUICK NOTE
Called patient's Home number, left voicemail  Spoke with Loly Rodgers, will order treatment and follow up with patient  Received voicemail from wife, diagnosis known and getting treated

## 2018-10-20 ENCOUNTER — APPOINTMENT (OUTPATIENT)
Dept: LAB | Facility: CLINIC | Age: 48
End: 2018-10-20
Payer: COMMERCIAL

## 2018-10-20 DIAGNOSIS — M25.521 RIGHT ELBOW PAIN: ICD-10-CM

## 2018-10-20 DIAGNOSIS — Z12.5 PROSTATE CANCER SCREENING: ICD-10-CM

## 2018-10-20 DIAGNOSIS — D72.828 OTHER ELEVATED WHITE BLOOD CELL (WBC) COUNT: ICD-10-CM

## 2018-10-20 DIAGNOSIS — Z11.59 NEED FOR HEPATITIS C SCREENING TEST: ICD-10-CM

## 2018-10-20 DIAGNOSIS — R73.9 HYPERGLYCEMIA: ICD-10-CM

## 2018-10-20 DIAGNOSIS — Z86.39 HISTORY OF HYPERLIPIDEMIA: ICD-10-CM

## 2018-10-20 LAB
BASOPHILS # BLD AUTO: 0.08 THOUSANDS/ΜL (ref 0–0.1)
BASOPHILS NFR BLD AUTO: 1 % (ref 0–1)
CHOLEST SERPL-MCNC: 187 MG/DL (ref 50–200)
EOSINOPHIL # BLD AUTO: 0.36 THOUSAND/ΜL (ref 0–0.61)
EOSINOPHIL NFR BLD AUTO: 4 % (ref 0–6)
ERYTHROCYTE [DISTWIDTH] IN BLOOD BY AUTOMATED COUNT: 12.4 % (ref 11.6–15.1)
EST. AVERAGE GLUCOSE BLD GHB EST-MCNC: 137 MG/DL
HBA1C MFR BLD: 6.4 % (ref 4.2–6.3)
HCT VFR BLD AUTO: 46.6 % (ref 36.5–49.3)
HDLC SERPL-MCNC: 36 MG/DL (ref 40–60)
HGB BLD-MCNC: 15.3 G/DL (ref 12–17)
IMM GRANULOCYTES # BLD AUTO: 0.02 THOUSAND/UL (ref 0–0.2)
IMM GRANULOCYTES NFR BLD AUTO: 0 % (ref 0–2)
LDLC SERPL CALC-MCNC: 132 MG/DL (ref 0–100)
LYMPHOCYTES # BLD AUTO: 3.46 THOUSANDS/ΜL (ref 0.6–4.47)
LYMPHOCYTES NFR BLD AUTO: 37 % (ref 14–44)
MCH RBC QN AUTO: 28 PG (ref 26.8–34.3)
MCHC RBC AUTO-ENTMCNC: 32.8 G/DL (ref 31.4–37.4)
MCV RBC AUTO: 85 FL (ref 82–98)
MONOCYTES # BLD AUTO: 0.7 THOUSAND/ΜL (ref 0.17–1.22)
MONOCYTES NFR BLD AUTO: 8 % (ref 4–12)
NEUTROPHILS # BLD AUTO: 4.63 THOUSANDS/ΜL (ref 1.85–7.62)
NEUTS SEG NFR BLD AUTO: 50 % (ref 43–75)
NONHDLC SERPL-MCNC: 151 MG/DL
NRBC BLD AUTO-RTO: 0 /100 WBCS
PLATELET # BLD AUTO: 464 THOUSANDS/UL (ref 149–390)
PMV BLD AUTO: 9.2 FL (ref 8.9–12.7)
PSA SERPL-MCNC: 0.5 NG/ML (ref 0–4)
RBC # BLD AUTO: 5.47 MILLION/UL (ref 3.88–5.62)
TRIGL SERPL-MCNC: 95 MG/DL
URATE SERPL-MCNC: 5.3 MG/DL (ref 4.2–8)
WBC # BLD AUTO: 9.25 THOUSAND/UL (ref 4.31–10.16)

## 2018-10-20 PROCEDURE — 83036 HEMOGLOBIN GLYCOSYLATED A1C: CPT

## 2018-10-20 PROCEDURE — 86803 HEPATITIS C AB TEST: CPT

## 2018-10-20 PROCEDURE — 84550 ASSAY OF BLOOD/URIC ACID: CPT

## 2018-10-20 PROCEDURE — 80061 LIPID PANEL: CPT

## 2018-10-20 PROCEDURE — 36415 COLL VENOUS BLD VENIPUNCTURE: CPT

## 2018-10-20 PROCEDURE — 85025 COMPLETE CBC W/AUTO DIFF WBC: CPT

## 2018-10-20 PROCEDURE — G0103 PSA SCREENING: HCPCS

## 2018-10-21 LAB — HCV AB SER QL: NORMAL

## 2018-10-24 ENCOUNTER — OFFICE VISIT (OUTPATIENT)
Dept: OCCUPATIONAL THERAPY | Facility: CLINIC | Age: 48
End: 2018-10-24
Payer: COMMERCIAL

## 2018-10-24 DIAGNOSIS — G56.21 CUBITAL TUNNEL SYNDROME ON RIGHT: Primary | ICD-10-CM

## 2018-10-24 PROCEDURE — G8986 CARRY D/C STATUS: HCPCS

## 2018-10-24 PROCEDURE — G8985 CARRY GOAL STATUS: HCPCS

## 2018-10-24 PROCEDURE — 97110 THERAPEUTIC EXERCISES: CPT

## 2018-10-24 PROCEDURE — 97112 NEUROMUSCULAR REEDUCATION: CPT

## 2018-10-24 PROCEDURE — 97140 MANUAL THERAPY 1/> REGIONS: CPT

## 2018-10-24 NOTE — PROGRESS NOTES
Daily Note     Today's date: 10/24/2018  Patient name: Shira Otoole  : 1970  MRN: [id number]  Referring provider: Larry Welch DO  Dx:   Encounter Diagnosis     ICD-10-CM    1  Cubital tunnel syndrome on right G56 21        Subjective: "It has been good at work "      Objective: See treatment diary below    Specialty Daily Treatment Diary     Manual  10/03/2018 10/08/2018 10/24/2018     Shine GT 4#  5 Min 5 Min     Wrist Stretches  30 sec x 3 30 sec x 3     Forearm Stretches  30 sec x 3 30 sec x 3     Ulnar Nerve Glides  X 10 X 10                 Exercise Diary  10/03/2018 10/08/2018 10/24/2018     Wrist Flexion  X 20 X 20     Pronation  X 20 X 20     Ulnar Nerve Glides  3 Planes x 10 3 Planes x 10     Digi-Flex  Blue x 20 Blue x 20     Theraputty Grasps        Theraputty Intrinsic Pushes  Yellow x 20 Yellow x 20     T-Band        Free Weight        UBE Machine   10 Min                                                                                                 Modalities 10/03/2018 10/08/2018 10/24/2018     Ultrasound        E-STIM w/ CP 15 Min 15 Min 15 Min                   Assessment: Tolerated treatment well  Patient exhibited good technique with therapeutic exercises and would benefit from continued OT  Patient has follow-up with MD on 2018  Patient reports limited issues at work and a decrease in symptoms since start of care  Plan: Continue per plan of care

## 2018-11-06 ENCOUNTER — OFFICE VISIT (OUTPATIENT)
Dept: FAMILY MEDICINE CLINIC | Facility: CLINIC | Age: 48
End: 2018-11-06
Payer: COMMERCIAL

## 2018-11-06 VITALS
WEIGHT: 161 LBS | HEIGHT: 69 IN | DIASTOLIC BLOOD PRESSURE: 80 MMHG | OXYGEN SATURATION: 97 % | BODY MASS INDEX: 23.85 KG/M2 | HEART RATE: 72 BPM | RESPIRATION RATE: 18 BRPM | SYSTOLIC BLOOD PRESSURE: 144 MMHG

## 2018-11-06 DIAGNOSIS — I10 ESSENTIAL HYPERTENSION: Primary | ICD-10-CM

## 2018-11-06 DIAGNOSIS — Z23 NEED FOR PNEUMOCOCCAL VACCINATION: ICD-10-CM

## 2018-11-06 DIAGNOSIS — Z23 NEED FOR INFLUENZA VACCINATION: ICD-10-CM

## 2018-11-06 DIAGNOSIS — Z12.11 SCREENING FOR COLON CANCER: ICD-10-CM

## 2018-11-06 DIAGNOSIS — R73.03 PREDIABETES: ICD-10-CM

## 2018-11-06 DIAGNOSIS — F17.210 CIGARETTE NICOTINE DEPENDENCE WITHOUT COMPLICATION: ICD-10-CM

## 2018-11-06 DIAGNOSIS — Z23 NEED FOR TDAP VACCINATION: ICD-10-CM

## 2018-11-06 PROBLEM — Z28.21 INFLUENZA VACCINATION DECLINED BY PATIENT: Status: ACTIVE | Noted: 2018-11-06

## 2018-11-06 PROCEDURE — 90472 IMMUNIZATION ADMIN EACH ADD: CPT

## 2018-11-06 PROCEDURE — 90471 IMMUNIZATION ADMIN: CPT

## 2018-11-06 PROCEDURE — 90686 IIV4 VACC NO PRSV 0.5 ML IM: CPT

## 2018-11-06 PROCEDURE — 90715 TDAP VACCINE 7 YRS/> IM: CPT

## 2018-11-06 PROCEDURE — 99214 OFFICE O/P EST MOD 30 MIN: CPT | Performed by: NURSE PRACTITIONER

## 2018-11-06 PROCEDURE — 3008F BODY MASS INDEX DOCD: CPT | Performed by: NURSE PRACTITIONER

## 2018-11-06 PROCEDURE — 90732 PPSV23 VACC 2 YRS+ SUBQ/IM: CPT

## 2018-11-06 RX ORDER — AMLODIPINE BESYLATE 5 MG/1
5 TABLET ORAL DAILY
Qty: 90 TABLET | Refills: 2 | Status: SHIPPED | OUTPATIENT
Start: 2018-11-06 | End: 2019-06-19 | Stop reason: SDUPTHER

## 2018-11-06 NOTE — PATIENT INSTRUCTIONS
Hypertension- stable  Limit salt in diet  Hyperlipidemia- strive for 5 servings of fruits and veggies daily  Daily exercisecarbo  Prediabetes- limit carbohydrates with each meal    Call DR Sully Machuca for colonoscopy for colon cancer screening      Hemoglobin A1c   WHAT YOU NEED TO KNOW:   What is a hemoglobin A1c test, and why do I need one? A hemoglobin A1c is a blood test that measures your average blood sugar level for the past 2 to 3 months  It is also called an HbA1c or glycohemoglobin test  An A1c test can help diagnose prediabetes or diabetes  It can also tell you how well your diabetes plan is working  A1c testing can help your healthcare provider make changes to your treatment plan  These changes can help improve or control your blood sugar levels  Good control of your blood sugar levels can decrease your risk for problems caused by diabetes  Examples include heart attack, stroke, blindness, kidney disease, and neuropathy (nerve problems)  What increases my risk for diabetes? You may need an A1c test if you have any of the following risk factors for diabetes:  · Heart disease    · High blood pressure    · Polycystic ovarian syndrome    · A first-degree relative with diabetes, such as a parent, brother, sister, or child    · Being overweight    · Lack of exercise or activity     · History of gestational diabetes and poor nutrition while pregnant  Who should get an A1c test?   · Adults who are overweight and have 1 or more risk factors for diabetes    · Adults 39years of age or older    · Children 7 to 25 years who are overweight and have 2 or more risk factors for diabetes    · Anyone with signs or symptoms of diabetes, such as increased thirst, slow-healing infections, or increased urination  What do the results of an A1c test mean? The results are given in percentages  · An A1c of 5 6% or lower means you do not have diabetes  · An A1c of 5 7% to 6 4% means you are at risk for diabetes   This is also called prediabetes  · An A1c of 6 5% or higher means you have diabetes  · If you currently have diabetes in good control, your A1c goal may be 7% or lower  Your healthcare provider will decide what your goal should be  This decision is based on your diabetes history and other medical conditions  You may need an A1c test 2 to 4 times each year, depending on your blood sugar level control  How should I prepare for the test?  You do not need to do anything to prepare for the test  Wear a short-sleeved or loose shirt to the test  This will make it easier to draw your blood  Other tests may be needed to diagnose or monitor diabetes if you have certain conditions  Tell your healthcare provider if you have any of the following:  · Iron-deficiency or [id number] anemia    · Cystic fibrosis    · Recent heavy blood loss or a blood transfusion    · Recent erythropoietin therapy    · Sickle cell disease or thalassemia    · Kidney failure or liver disease  What will happen after the test?  Schedule a follow-up appointment with your healthcare provider to talk about your test results  · If your A1c is lower than your goal , your medicines may be changed  · If your A1c is at your goal , you may not need any changes to your diabetes treatment plan  · If your A1c is higher than your goal , you may need changes to your medicines, eating plan, or exercise plan  What else should I know about an A1c test?   · You may get an estimated Average Glucose (eAG) with your A1c results  The eAG gives your A1c result in numbers like you see on your glucose meter  For example, an A1c of 6% will be reported as an eAG of 126 mg/dL  This means your average blood sugar level was 126 mg/dL over the last 2 to 3 months  The eAG can help you understand if your A1c result is on target for what your healthcare provider recommends       · You are at risk for an uncommon type of hemoglobin if you are , Mediterranean, or [address] [place]  This type of hemoglobin can affect your A1c test results  Tell your healthcare provider if you come from any of these backgrounds  You may need to have your A1c sent to a lab with certain equipment  This will help make sure your results are accurate  CARE AGREEMENT:   You have the right to help plan your care  To help with this plan, you must learn about your lab tests  You can then discuss the results with your caregivers  Work with them to decide what care may be used to treat you  You always have the right to refuse treatment  The above information is an  only  It is not intended as medical advice for individual conditions or treatments  Talk to your doctor, nurse or pharmacist before following any medical regimen to see if it is safe and effective for you  © 2017 [facility] [facility] Information is for End User's use only and may not be sold, redistributed or otherwise used for commercial purposes  All illustrations and images included in CareNotes® are the copyrighted property of [employer] M [employer]  or Navneet Garcia  Basic Carbohydrate Counting   AMBULATORY CARE:   Carbohydrate counting  is a way to plan your meals by counting the amount of carbohydrate in foods  Carbohydrates are the sugars, starches, and fiber found in fruit, grains, vegetables, and milk products  Carbohydrates increase your blood sugar levels  Carbohydrate counting can help you eat the right amount of carbohydrate to keep your blood sugar levels under control  What you need to know about planning meals using carbohydrate counting:  · A dietitian or healthcare provider will help you develop a healthy meal plan that works best for you  You will be taught how much carbohydrate to eat or drink for each meal and snack  Your meal plan will be based on your age, weight, usual food intake, and physical activity level   If you have diabetes, it will also include your blood sugar levels and diabetes medicine  Once you know how much carbohydrate you should eat, you can decide what type of food you want to eat  · You will need to know what foods contain carbohydrate and how much they contain  Keep track of the amount of carbohydrate in meals and snacks in order to follow your meal plan  Do not avoid carbohydrates or skip meals  Your blood sugar may fall too low if you do not eat enough carbohydrate or you skip meals  Foods that contain carbohydrate:   · Breads:  Each serving of food listed below contains about 15 g of carbohydrate   ¨ 1 slice of bread (1 ounce) or 1 flour or corn tortilla (6 inch)    ¨ ½ of a hamburger bun or ¼ of a large bagel (about 1 ounce)    ¨ 1 pancake (about 4 inches across and ¼ inch thick)    · Cereals and grains:  Serving sizes of ready-to-eat cereals vary  Look at the serving size and the total carbohydrate amount listed on the food label  Each serving of food listed below contains about 15 g of carbohydrate   ¨ ¾ cup of dry, unsweetened, ready-to-eat cereal or ¼ cup of low-fat granola     ¨ ½ cup of oatmeal or other cooked cereal     ¨ ? cup of cooked rice or pasta    · Starchy vegetables and beans:  Each serving of food listed below contains about 15 g of carbohydrate   ¨ ½ cup of corn, green peas, sweet potatoes, or mashed potatoes    ¨ ¼ of a large baked potato    ¨ ½ cup of beans, lentils, and peas (garbanzo, arreguin, kidney, white, split, black-eyed)    · Crackers and snacks:  Each serving of food listed below contains about 15 g of carbohydrate   ¨ 3 lavon cracker squares or 8 animal crackers     ¨ 6 saltine-type crackers    ¨ 3 cups of popcorn or ¾ ounce of pretzels, potato chips, or tortilla chips    · Fruit:  Each serving of food listed below contains about 15 g of carbohydrate       ¨ 1 small (4 ounce) piece of fresh fruit or ¾ to 1 cup of fresh fruit    ¨ ½ cup of canned or frozen fruit, packed in natural juice    ¨ ½ cup (4 ounces) of unsweetened fruit juice    ¨ 2 tablespoons of dried fruit    · Desserts or sugary foods:  Each serving of food listed below contains about 15 g of carbohydrate   ¨ 2-inch square unfrosted cake or brownie     ¨ 2 small cookies    ¨ ½ cup of ice cream, frozen yogurt, or nondairy frozen yogurt    ¨ ¼ cup of sherbet or sorbet    ¨ 1 tablespoon of regular syrup, jam, or jelly    ¨ 2 tablespoons of light syrup    · Milk and yogurt:  Foods from the milk group contain about 12 g of carbohydrate per serving  ¨ 1 cup of fat-free or low-fat milk    ¨ 1 cup of soy milk    ¨ ? cup of fat-free, yogurt sweetened with artificial sweetener    · Non-starchy vegetables:  Each serving contains about 5 g of carbohydrate   Three servings of non-starch vegetables count as 1 carbohydrate serving  ¨ ½ cup of cooked vegetables or 1 cup of raw vegetables  This includes beets, broccoli, cabbage, cauliflower, cucumber, mushrooms, tomatoes, and zucchini    ¨ ½ cup of vegetable juice  How to use carbohydrate counting to plan meals:   · Count carbohydrate amounts using serving sizes:      ¨ Pasta dinner example: You plan to have pasta, tossed salad, and an 8-ounce glass of milk  Your healthcare provider tells you that you may have 4 carbohydrate servings for dinner  One carbohydrate serving of pasta is ? cup  One cup of pasta will equal 3 carbohydrate servings  An 8-ounce glass of milk will count as 1 carbohydrate serving  These amounts of food would equal 4 carbohydrate servings  One cup of tossed salad does not count toward your carbohydrate servings  · Count carbohydrate amounts using food labels:  Find the total amount of carbohydrate in a packaged food by reading the food label  Food labels tell you the serving size of the food and the total carbohydrate amount in each serving  Find the serving size on the food label and then decide how many servings you will eat   Multiply the number of servings you plan to eat by the carbohydrate amount per serving  ¨ Granola bar snack example: Your meal plan allows you to have 2 carbohydrate servings (30 grams) of carbohydrate for a snack  You plan to eat 1 package of granola bars, which contains 2 bars  According to the food label, the serving size of food in this package is 1 bar  Each serving (1 bar) contains 25 grams of carbohydrate  The total amount of carbohydrate in this package of granola bars would be 50 g  Based on your meal plan, you should eat only 1 bar  Follow up with your healthcare provider as directed:  Write down your questions so you remember to ask them during your visits  © 2017 [facility] Information is for End User's use only and may not be sold, redistributed or otherwise used for commercial purposes  All illustrations and images included in CareNotes® are the copyrighted property of [employer]  or Navneet Garcia  The above information is an  only  It is not intended as medical advice for individual conditions or treatments  Talk to your doctor, nurse or pharmacist before following any medical regimen to see if it is safe and effective for you  Thank you for enrolling in [address] [place] [address]  Please follow the instructions below to securely access your online medical record  [employer] allows you to send messages to your doctor, view your test results, renew your prescriptions, schedule appointments, and more  [address] uses Single Sign on (SSO) Technology for you to log in and access our [facility], including [employer]  No more remembering multiple user names and passwords! We are going to guide you through, step by step, to help you set up your [address] [place] [address] account which will provide access to your [employer] account  How Do I Sign Up? 1  In your Internet browser, go to [email or web address]       2   Click on the   [facility] patient account and then click Dont have an Account? Create one now      3  Enter your demographic information and chose a user name (email address) and password  Think of one that is secure and easy to remember  Enter a Referral code if you have one (this is not your [employer] code ) Accept the Terms and Conditions and the Privacy Policy  4  Select your security questions that you will use to reset your password should you forget it  Click Submit  5  Enter your [employer] Activation Code exactly as it appears below  You will not need to use this code after you have completed the sign-up process  If you do not sign up before the expiration date, you must request a new code  [employer] Activation Code: [email or web address]  Expires: 11/20/2018  6:01 PM    6  Confirm your email address  An email confirmation was sent to you  Please open that email and click Confirm your Email   You should then be redirected to our [address] [place] [address] Single sign on page, where you will log on with the user name and password you created! Proceed to the Bank[employer] Icon to view your personal health information  Additional Information  If you have questions, you can e-mail [email or web address] or call [phone] to talk to our customer support staff  Remember, [employer] is NOT to be used for urgent needs  For medical emergencies, dial 911  Heart Healthy Diet   AMBULATORY CARE:   A heart healthy diet  is an eating plan low in total fat, unhealthy fats, and sodium (salt)  A heart healthy diet helps decrease your risk for heart disease and stroke  Limit the amount of fat you eat to 25% to 35% of your total daily calories  Limit sodium to less than 2,300 mg each day  Healthy fats:  Healthy fats can help improve cholesterol levels  The risk for heart disease is decreased when cholesterol levels are normal  Choose healthy fats, such as the following:  · Unsaturated fat  is found in foods such as soybean, canola, olive, corn, and safflower oils   It is also found in soft tub margarine that is made with liquid vegetable oil  · Omega-3 fat  is found in certain fish, such as salmon, tuna, and trout, and in walnuts and flaxseed  Unhealthy fats:  Unhealthy fats can cause unhealthy cholesterol levels in your blood and increase your risk of heart disease  Limit unhealthy fats, such as the following:  · Cholesterol  is found in animal foods, such as eggs and lobster, and in dairy products made from whole milk  Limit cholesterol to less than 300 milligrams (mg) each day  You may need to limit cholesterol to 200 mg each day if you have heart disease  · Saturated fat  is found in meats, such as arenas and hamburger  It is also found in chicken or turkey skin, whole milk, and butter  Limit saturated fat to less than 7% of your total daily calories  Limit saturated fat to less than 6% if you have heart disease or are at increased risk for it  · Trans fat  is found in packaged foods, such as potato chips and cookies  It is also in hard margarine, some fried foods, and shortening  Avoid trans fats as much as possible    Heart healthy foods and drinks to include:  Ask your dietitian or healthcare provider how many servings to have from each of the following food groups:  · Grains:      ¨ Whole-wheat breads, cereals, and pastas, and brown rice    ¨ Low-fat, low-sodium crackers and chips    · Vegetables:      ¨ Broccoli, green beans, green peas, and spinach    ¨ Collards, kale, and lima beans    ¨ Carrots, sweet potatoes, tomatoes, and peppers    ¨ Canned vegetables with no salt added    · Fruits:      ¨ Bananas, peaches, pears, and pineapple    ¨ Grapes, raisins, and dates    ¨ Oranges, tangerines, grapefruit, orange juice, and grapefruit juice    ¨ Apricots, mangoes, melons, and papaya    ¨ Raspberries and strawberries    ¨ Canned fruit with no added sugar    · Low-fat dairy products:      ¨ Nonfat (skim) milk, 1% milk, and low-fat almond, cashew, or soy milks fortified with calcium    ¨ Low-fat cheese, regular or frozen yogurt, and cottage cheese    · Meats and proteins , such as lean cuts of beef and pork (loin, leg, round), skinless chicken and turkey, legumes, soy products, egg whites, and nuts  Foods and drinks to limit or avoid:  Ask your dietitian or healthcare provider about these and other foods that are high in unhealthy fat, sodium, and sugar:  · Snack or packaged foods , such as frozen dinners, cookies, macaroni and cheese, and cereals with more than 300 mg of sodium per serving    · Canned or dry mixes  for cakes, soups, sauces, or gravies    · Vegetables with added sodium , such as instant potatoes, vegetables with added sauces, or regular canned vegetables    · Other foods high in sodium , such as ketchup, barbecue sauce, salad dressing, pickles, olives, soy sauce, and miso    · High-fat dairy foods  such as whole or 2% milk, cream cheese, or sour cream, and cheeses     · High-fat protein foods  such as high-fat cuts of beef (T-bone steaks, ribs), chicken or turkey with skin, and organ meats, such as liver    · Cured or smoked meats , such as hot dogs, arenas, and sausage    · Unhealthy fats and oils , such as butter, stick margarine, shortening, and cooking oils such as coconut or palm oil    · Food and drinks high in sugar , such as soft drinks (soda), sports drinks, sweetened tea, candy, cake, cookies, pies, and doughnuts  Other diet guidelines to follow:   · Eat more foods containing omega-3 fats  Eat fish high in omega-3 fats at least 2 times a week  · Limit alcohol  Too much alcohol can damage your heart and raise your blood pressure  Women should limit alcohol to 1 drink a day  Men should limit alcohol to 2 drinks a day  A drink of alcohol is 12 ounces of beer, 5 ounces of wine, or 1½ ounces of liquor  · Choose low-sodium foods  High-sodium foods can lead to high blood pressure  Add little or no salt to food you prepare   Use herbs and spices in place of salt     · Eat more fiber  to help lower cholesterol levels  Eat at least 5 servings of fruits and vegetables each day  Eat 3 ounces of whole-grain foods each day  Legumes (beans) are also a good source of fiber  Lifestyle guidelines:   · Do not smoke  Nicotine and other chemicals in cigarettes and cigars can cause lung and heart damage  Ask your healthcare provider for information if you currently smoke and need help to quit  E-cigarettes or smokeless tobacco still contain nicotine  Talk to your healthcare provider before you use these products  · Exercise regularly  to help you maintain a healthy weight and improve your blood pressure and cholesterol levels  Ask your healthcare provider about the best exercise plan for you  Do not start an exercise program without asking your healthcare provider  Follow up with your healthcare provider as directed:  Write down your questions so you remember to ask them during your visits  © 2017 [facility] [facility] Information is for End User's use only and may not be sold, redistributed or otherwise used for commercial purposes  All illustrations and images included in CareNotes® are the copyrighted property of [employer]  or Navneet Garcia  The above information is an  only  It is not intended as medical advice for individual conditions or treatments  Talk to your doctor, nurse or pharmacist before following any medical regimen to see if it is safe and effective for you

## 2018-11-06 NOTE — PROGRESS NOTES
Assessment/Plan:    No problem-specific Assessment & Plan notes found for this encounter  Diagnoses and all orders for this visit:    Essential hypertension  -     amLODIPine (NORVASC) 5 mg tablet; Take 1 tablet (5 mg total) by mouth daily for 90 days  -     Comprehensive metabolic panel; Future  -     Lipid panel; Future  -     Hemoglobin A1C; Future    Cigarette nicotine dependence without complication  -     Comprehensive metabolic panel; Future  -     Lipid panel; Future  -     Hemoglobin A1C; Future    Prediabetes  -     Comprehensive metabolic panel; Future  -     Lipid panel; Future  -     Hemoglobin A1C; Future    Need for influenza vaccination  -     SYRINGE/SINGLE-DOSE VIAL: influenza vaccine, [id number], quadrivalent, 0 5 mL, preservative-free, for patients 3+ yr (FLUZONE)    Need for Tdap vaccination  -     TDAP VACCINE GREATER THAN OR EQUAL TO 6YO IM    Need for pneumococcal vaccination  -     PNEUMOCOCCAL POLYSACCHARIDE VACCINE 23-VALENT =>3YO SQ IM    Screening for colon cancer  -     Ambulatory referral to Gastroenterology; Future          Subjective:      Patient ID: Tita Warren is a 51 y o  male  Pt is here for follow up and lab review  Labs are as follows : CMP is wnl    Chol 187  TG- 95  HDL-36  LDL-132     A1C 6 4    htn- stable  Prediabetes- he drinks a lot of [employer]  He doesn't at breakfast and he only eats lunch  Pulmonary nodule- has appmnt with Pulmonologist in [place]     Currently being treated for Lyme with doxycycline  The following portions of the patient's history were reviewed and updated as appropriate:   He  has no past medical history on file    He   Patient Active Problem List    Diagnosis Date Noted    Influenza vaccination declined by patient 11/06/2018    Prediabetes 11/06/2018    Need for influenza vaccination 11/06/2018    Need for pneumococcal vaccination 11/06/2018    Prostate cancer screening 10/03/2018    Periodontal disease 10/03/2018    Abnormal chest CT 10/03/2018    Mass in chest 10/03/2018    Cigarette nicotine dependence without complication 12/98/6288    Cubital tunnel syndrome on right 10/03/2018    Essential hypertension 10/03/2018    Leucocytosis 10/03/2018    Right elbow pain 10/03/2018    History of hyperlipidemia 10/03/2018    Screening for colon cancer 10/03/2018    Hyperglycemia 10/03/2018     He  has no past surgical history on file  His family history is not on file  He  reports that he has been smoking Cigarettes  He has been smoking about 0 50 packs per day  He has never used smokeless tobacco  He reports that he drinks about 8 4 oz of alcohol per week   He reports that he does not use drugs  Current Outpatient Prescriptions   Medication Sig Dispense Refill    amLODIPine (NORVASC) 5 mg tablet Take 1 tablet (5 mg total) by mouth daily for 90 days 90 tablet 2    doxycycline hyclate (VIBRAMYCIN) 100 mg capsule Take 1 capsule (100 mg total) by mouth every 12 (twelve) hours for 30 days 60 capsule 0     No current facility-administered medications for this visit  Current Outpatient Prescriptions on File Prior to Visit   Medication Sig    doxycycline hyclate (VIBRAMYCIN) 100 mg capsule Take 1 capsule (100 mg total) by mouth every 12 (twelve) hours for 30 days    [DISCONTINUED] amLODIPine (NORVASC) 5 mg tablet Take 1 tablet (5 mg total) by mouth daily for 30 days    [DISCONTINUED] methocarbamol (ROBAXIN) 500 mg tablet Take 2 tablets (1,000 mg total) by mouth 2 (two) times a day    [DISCONTINUED] predniSONE 20 mg tablet Take 3 tablets (60 mg total) by mouth daily     No current facility-administered medications on file prior to visit  He has No Known Allergies       Review of Systems   Constitutional: Negative for fatigue and fever  HENT: Negative for congestion  Eyes: Negative for visual disturbance  Respiratory: Negative for cough and shortness of breath      Cardiovascular: Negative for chest pain, palpitations and leg swelling  Gastrointestinal: Negative for abdominal distention and abdominal pain  Endocrine: Negative for cold intolerance, polydipsia and polyuria  Genitourinary: Negative for difficulty urinating  Musculoskeletal: Negative for back pain and joint swelling  Skin: Negative for color change and rash  Allergic/Immunologic: Negative for immunocompromised state  Neurological: Negative for dizziness and headaches  Hematological: Negative for adenopathy  Psychiatric/Behavioral: Negative for behavioral problems and sleep disturbance  All other systems reviewed and are negative  Objective:      /80 (BP Location: Left arm, Patient Position: Sitting)   Pulse 72   Resp 18   Ht 5' 9" (1 753 m)   Wt 73 kg (161 lb)   SpO2 97%   BMI 23 78 kg/m²          Physical Exam   Constitutional: He is oriented to person, place, and time  He appears well-developed and well-nourished  No distress  HENT:   Head: Normocephalic and atraumatic  Right Ear: External ear normal    Left Ear: External ear normal    Nose: Nose normal    Mouth/Throat: Oropharynx is clear and moist  No oropharyngeal exudate  Eyes: Pupils are equal, round, and reactive to light  Conjunctivae are normal  Right eye exhibits no discharge  Left eye exhibits no discharge  Neck: Normal range of motion  Neck supple  Cardiovascular: Normal rate, regular rhythm and normal heart sounds  Exam reveals no gallop and no friction rub  No murmur heard  Pulmonary/Chest: Effort normal and breath sounds normal  No respiratory distress  He has no wheezes  Abdominal: Soft  Musculoskeletal: Normal range of motion  He exhibits no edema  Lymphadenopathy:     He has no cervical adenopathy  Neurological: He is alert and oriented to person, place, and time  Skin: Skin is warm and dry  No rash noted  He is not diaphoretic  No erythema  Psychiatric: He has a normal mood and affect   His behavior is normal  Judgment and thought content normal    Nursing note and vitals reviewed

## 2019-01-07 NOTE — PROGRESS NOTES
OT DISCHARGE    Patient has Inconsistent attended OP OT services since start of care  Patient has attended 3 visits since start of care  Patient last missed visit/visit was on 10/24/2018  Patient has not returned to clinic for further treatment  Thank you for the referral  Please refer to patient's last assessment for most recent objective measurements

## 2019-06-15 ENCOUNTER — APPOINTMENT (OUTPATIENT)
Dept: LAB | Facility: CLINIC | Age: 49
End: 2019-06-15
Payer: COMMERCIAL

## 2019-06-15 DIAGNOSIS — F17.210 CIGARETTE NICOTINE DEPENDENCE WITHOUT COMPLICATION: ICD-10-CM

## 2019-06-15 DIAGNOSIS — I10 ESSENTIAL HYPERTENSION: ICD-10-CM

## 2019-06-15 DIAGNOSIS — R73.03 PREDIABETES: ICD-10-CM

## 2019-06-15 LAB
ALBUMIN SERPL BCP-MCNC: 4 G/DL (ref 3.5–5)
ALP SERPL-CCNC: 85 U/L (ref 46–116)
ALT SERPL W P-5'-P-CCNC: 26 U/L (ref 12–78)
ANION GAP SERPL CALCULATED.3IONS-SCNC: 2 MMOL/L (ref 4–13)
AST SERPL W P-5'-P-CCNC: 14 U/L (ref 5–45)
BILIRUB SERPL-MCNC: 0.5 MG/DL (ref 0.2–1)
BUN SERPL-MCNC: 9 MG/DL (ref 5–25)
CALCIUM SERPL-MCNC: 9.3 MG/DL (ref 8.3–10.1)
CHLORIDE SERPL-SCNC: 103 MMOL/L (ref 100–108)
CHOLEST SERPL-MCNC: 191 MG/DL (ref 50–200)
CO2 SERPL-SCNC: 29 MMOL/L (ref 21–32)
CREAT SERPL-MCNC: 0.82 MG/DL (ref 0.6–1.3)
EST. AVERAGE GLUCOSE BLD GHB EST-MCNC: 128 MG/DL
GFR SERPL CREATININE-BSD FRML MDRD: 103 ML/MIN/1.73SQ M
GLUCOSE P FAST SERPL-MCNC: 121 MG/DL (ref 65–99)
HBA1C MFR BLD: 6.1 % (ref 4.2–6.3)
HDLC SERPL-MCNC: 40 MG/DL (ref 40–60)
LDLC SERPL CALC-MCNC: 134 MG/DL (ref 0–100)
NONHDLC SERPL-MCNC: 151 MG/DL
POTASSIUM SERPL-SCNC: 4.2 MMOL/L (ref 3.5–5.3)
PROT SERPL-MCNC: 8.6 G/DL (ref 6.4–8.2)
SODIUM SERPL-SCNC: 134 MMOL/L (ref 136–145)
TRIGL SERPL-MCNC: 86 MG/DL

## 2019-06-15 PROCEDURE — 83036 HEMOGLOBIN GLYCOSYLATED A1C: CPT

## 2019-06-15 PROCEDURE — 36415 COLL VENOUS BLD VENIPUNCTURE: CPT

## 2019-06-15 PROCEDURE — 80061 LIPID PANEL: CPT

## 2019-06-15 PROCEDURE — 80053 COMPREHEN METABOLIC PANEL: CPT

## 2019-06-19 ENCOUNTER — OFFICE VISIT (OUTPATIENT)
Dept: FAMILY MEDICINE CLINIC | Facility: CLINIC | Age: 49
End: 2019-06-19
Payer: COMMERCIAL

## 2019-06-19 VITALS
BODY MASS INDEX: 23.55 KG/M2 | HEIGHT: 69 IN | DIASTOLIC BLOOD PRESSURE: 82 MMHG | HEART RATE: 88 BPM | SYSTOLIC BLOOD PRESSURE: 136 MMHG | OXYGEN SATURATION: 98 % | RESPIRATION RATE: 18 BRPM | WEIGHT: 159 LBS

## 2019-06-19 DIAGNOSIS — R73.03 PREDIABETES: ICD-10-CM

## 2019-06-19 DIAGNOSIS — Z12.11 SCREENING FOR COLON CANCER: ICD-10-CM

## 2019-06-19 DIAGNOSIS — F17.210 CIGARETTE NICOTINE DEPENDENCE WITHOUT COMPLICATION: ICD-10-CM

## 2019-06-19 DIAGNOSIS — M25.521 ELBOW PAIN, RIGHT: ICD-10-CM

## 2019-06-19 DIAGNOSIS — E78.2 HYPERLIPIDEMIA, MIXED: ICD-10-CM

## 2019-06-19 DIAGNOSIS — I10 ESSENTIAL HYPERTENSION: Primary | ICD-10-CM

## 2019-06-19 DIAGNOSIS — G56.21 CUBITAL TUNNEL SYNDROME ON RIGHT: ICD-10-CM

## 2019-06-19 PROCEDURE — 3079F DIAST BP 80-89 MM HG: CPT | Performed by: NURSE PRACTITIONER

## 2019-06-19 PROCEDURE — 3008F BODY MASS INDEX DOCD: CPT | Performed by: NURSE PRACTITIONER

## 2019-06-19 PROCEDURE — 3075F SYST BP GE 130 - 139MM HG: CPT | Performed by: NURSE PRACTITIONER

## 2019-06-19 PROCEDURE — 99214 OFFICE O/P EST MOD 30 MIN: CPT | Performed by: NURSE PRACTITIONER

## 2019-06-19 RX ORDER — AMLODIPINE BESYLATE 5 MG/1
5 TABLET ORAL DAILY
Qty: 90 TABLET | Refills: 3 | Status: SHIPPED | OUTPATIENT
Start: 2019-06-19 | End: 2019-09-17

## 2019-06-19 RX ORDER — ATORVASTATIN CALCIUM 10 MG/1
10 TABLET, FILM COATED ORAL DAILY
Qty: 90 TABLET | Refills: 3 | Status: SHIPPED | OUTPATIENT
Start: 2019-06-19

## 2023-07-19 ENCOUNTER — OFFICE VISIT (OUTPATIENT)
Dept: FAMILY MEDICINE CLINIC | Facility: CLINIC | Age: 53
End: 2023-07-19
Payer: COMMERCIAL

## 2023-07-19 VITALS
TEMPERATURE: 97.7 F | HEART RATE: 107 BPM | WEIGHT: 156.25 LBS | OXYGEN SATURATION: 98 % | SYSTOLIC BLOOD PRESSURE: 138 MMHG | HEIGHT: 68 IN | DIASTOLIC BLOOD PRESSURE: 80 MMHG | BODY MASS INDEX: 23.68 KG/M2

## 2023-07-19 DIAGNOSIS — F17.200 TOBACCO DEPENDENCE: ICD-10-CM

## 2023-07-19 DIAGNOSIS — D72.829 LEUKOCYTOSIS, UNSPECIFIED TYPE: ICD-10-CM

## 2023-07-19 DIAGNOSIS — Z00.00 ANNUAL PHYSICAL EXAM: ICD-10-CM

## 2023-07-19 DIAGNOSIS — Z12.11 COLON CANCER SCREENING: Primary | ICD-10-CM

## 2023-07-19 DIAGNOSIS — I73.9 CLAUDICATION (HCC): ICD-10-CM

## 2023-07-19 DIAGNOSIS — M62.831 MUSCLE SPASM OF CALF: ICD-10-CM

## 2023-07-19 DIAGNOSIS — Z11.4 SCREENING FOR HIV (HUMAN IMMUNODEFICIENCY VIRUS): ICD-10-CM

## 2023-07-19 DIAGNOSIS — Z13.220 LIPID SCREENING: ICD-10-CM

## 2023-07-19 DIAGNOSIS — Z13.1 DIABETES MELLITUS SCREENING: ICD-10-CM

## 2023-07-19 DIAGNOSIS — M79.604 PAIN IN RIGHT LEG: ICD-10-CM

## 2023-07-19 PROBLEM — Z12.5 PROSTATE CANCER SCREENING: Status: RESOLVED | Noted: 2018-10-03 | Resolved: 2023-07-19

## 2023-07-19 PROBLEM — Z23 NEED FOR INFLUENZA VACCINATION: Status: RESOLVED | Noted: 2018-11-06 | Resolved: 2023-07-19

## 2023-07-19 PROBLEM — Z86.39 HISTORY OF HYPERLIPIDEMIA: Status: RESOLVED | Noted: 2018-10-03 | Resolved: 2023-07-19

## 2023-07-19 PROBLEM — R93.89 ABNORMAL CHEST CT: Status: RESOLVED | Noted: 2018-10-03 | Resolved: 2023-07-19

## 2023-07-19 PROBLEM — G56.21 CUBITAL TUNNEL SYNDROME ON RIGHT: Status: RESOLVED | Noted: 2018-10-03 | Resolved: 2023-07-19

## 2023-07-19 PROBLEM — K05.6 PERIODONTAL DISEASE: Status: RESOLVED | Noted: 2018-10-03 | Resolved: 2023-07-19

## 2023-07-19 PROBLEM — Z28.21 INFLUENZA VACCINATION DECLINED BY PATIENT: Status: RESOLVED | Noted: 2018-11-06 | Resolved: 2023-07-19

## 2023-07-19 PROBLEM — M25.521 ELBOW PAIN, RIGHT: Status: RESOLVED | Noted: 2018-10-03 | Resolved: 2023-07-19

## 2023-07-19 PROBLEM — Z23 NEED FOR PNEUMOCOCCAL VACCINATION: Status: RESOLVED | Noted: 2018-11-06 | Resolved: 2023-07-19

## 2023-07-19 PROBLEM — R73.9 HYPERGLYCEMIA: Status: RESOLVED | Noted: 2018-10-03 | Resolved: 2023-07-19

## 2023-07-19 PROBLEM — R22.2 MASS IN CHEST: Status: RESOLVED | Noted: 2018-10-03 | Resolved: 2023-07-19

## 2023-07-19 PROCEDURE — 99386 PREV VISIT NEW AGE 40-64: CPT | Performed by: STUDENT IN AN ORGANIZED HEALTH CARE EDUCATION/TRAINING PROGRAM

## 2023-07-19 RX ORDER — METHOCARBAMOL 500 MG/1
500 TABLET, FILM COATED ORAL 4 TIMES DAILY
Qty: 30 TABLET | Refills: 0 | Status: ON HOLD | OUTPATIENT
Start: 2023-07-19

## 2023-07-19 RX ORDER — ASPIRIN 81 MG/1
81 TABLET ORAL DAILY
Qty: 30 TABLET | Refills: 0 | Status: ON HOLD | OUTPATIENT
Start: 2023-07-19

## 2023-07-19 RX ORDER — PREDNISONE 50 MG/1
TABLET ORAL
Status: ON HOLD | COMMUNITY
Start: 2023-07-17

## 2023-07-19 NOTE — PROGRESS NOTES
ADULT ANNUAL PHYSICAL  [facility] [place] PRIMARY CARE    NAME: Cece Coleman  AGE: 46 y.o. SEX: male  : 1970     DATE: 2023     Assessment and Plan:     Problem List Items Addressed This Visit        Other    Tobacco dependence     20 pack year smoking Hx  Patient is ready to quit at this time and I have explained that smoking will increase his risk of cardiac events  I have offered medical treatments to assist with tobacco cessation but patient declines at this time. States that he would like to quit cold turkey          Relevant Orders    CT lung screening program    Leukocytosis     Unclear source for Leukocytosis at this time  Will recheck cbc         Relevant Medications    predniSONE 50 mg tablet    Annual physical exam     Patient is a 47 y/o M with poor medical compliance. Has not seen a physician in many years. He has a pmhx of Tobacco dependence, Prediabetes, HLD and HTN. Patient presents today to establish care  Routine blood work has been ordered  [address] has been ordered  Fecal Occult testing has been ordered. Pain in right leg     Given patient hx of prediabetes, HTN, HLD and Tobacco dependence, symptoms seem to be related to PVD/Claudication. Will check CRISTEL  Will check RLE xray  Recommend starting ASA  Continue Norvasc for BP control   Will start statin pending Lipid Panel    Not work provided, and PT referral placed.          Relevant Orders    Ambulatory Referral to Physical Therapy    XR tibia fibula 2 vw right   Other Visit Diagnoses     Colon cancer screening    -  Primary    Relevant Orders    Occult Blood, Fecal Immunochemical    Screening for HIV (human immunodeficiency virus)        Relevant Orders    HIV 1/2 AG/AB w Reflex [facility] for 2 yr old and above    Claudication ([address])        Relevant Medications    aspirin (ECOTRIN LOW STRENGTH) 81 mg EC tablet    Other Relevant Orders    CBC and differential VAS CRISTEL with exercise study    Lipid screening        Relevant Orders    Lipid panel    Diabetes mellitus screening        Relevant Orders    Comprehensive metabolic panel    Hemoglobin A1C    Muscle spasm of calf        Relevant Medications    methocarbamol (ROBAXIN) 500 mg tablet          Immunizations and preventive care screenings were discussed with patient today. Appropriate education was printed on patient's after visit summary. Counseling:  Alcohol/drug use: discussed moderation in alcohol intake, the recommendations for healthy alcohol use, and avoidance of illicit drug use. · Dental Health: discussed importance of regular tooth brushing, flossing, and dental visits. BMI Counseling: Body mass index is 23.76 kg/m². The BMI is above normal. Nutrition recommendations include decreasing portion sizes and moderation in carbohydrate intake. Exercise recommendations include strength training exercises. Rationale for BMI follow-up plan is due to patient being overweight or obese. Depression Screening and Follow-up Plan: Patient was screened for depression during today's encounter. They screened negative with a PHQ-2 score of 0. Tobacco Cessation Counseling: Tobacco cessation counseling was provided. The patient is sincerely urged to quit consumption of tobacco. He is ready to quit tobacco. Nicotine patch was prescribed. Lung Cancer Screening Shared Decision Making: I discussed with him that he is a candidate for lung cancer CT screening. The following Shared Decision-Making points were covered:  1. Benefits of screening were discussed, including the rates of reduction in death from lung cancer and other causes. Harms of screening were reviewed, including false positive tests, radiation exposure levels, risks of invasive procedures, risks of complications of screening, and risk of overdiagnosis.   2. I counseled on the importance of adherence to annual lung cancer LDCT screening, impact of co-morbidities, and ability or willingness to undergo diagnosis and treatment. 3. I counseled on the importance of maintaining abstinence as a former smoker or was counseled on the importance of smoking cessation if a current smoker    Review of Eligibility Criteria: He meets all of the criteria for Lung Cancer Screening.   - He is 46 y.o.   - He has 20 pack year tobacco history and is a current smoker or has quit within the past 15 years  - He presents no signs or symptoms of lung cancer    After discussion, the patient decided to elect lung cancer screening. Return in about 2 weeks (around 8/2/2023) for Review labs and CRISTEL study . Chief Complaint:     Chief Complaint   Patient presents with   • Establish Care      History of Present Illness:     Adult Annual Physical   Patient here for a comprehensive physical exam. The patient reports problems - Reports that while at work yesterday he was walking when he had sudden onset pain and tingling in his right lower extremity. Patient states that he sat down for a few minutes and elevated leg but the symptoms did not improve. Patient reports that he went ti the ER at that time. States that symptoms have not imrpoved. He has been using crutches to walk and feels as though he cannot bear any weight on the right leg. .    Diet and Physical Activity  · Diet/Nutrition: poor diet. · Exercise: no formal exercise. Depression Screening  PHQ-2/9 Depression Screening    Little interest or pleasure in doing things: 0 - not at all  Feeling down, depressed, or hopeless: 0 - not at all  PHQ-2 Score: 0  PHQ-2 Interpretation: Negative depression screen       General Health  · Sleep: sleeps poorly. · Hearing: normal - bilateral.  · Vision: no vision problems. · Dental: no dental visits for >1 year.         Health  · Symptoms include: none     Has a 20 pack year smoking Hx, smoking currently about 1 ppd     Review of Systems:     Review of Systems   Constitutional: Negative for chills and fever. HENT: Negative for congestion and rhinorrhea. Respiratory: Negative for cough and shortness of breath. Cardiovascular: Negative for chest pain and palpitations. Gastrointestinal: Negative for abdominal pain. Musculoskeletal: Positive for gait problem. Pain in right calf, and inability to bear weight   Neurological: Negative for dizziness and headaches. Past Medical History:     Past Medical History:   Diagnosis Date   • Hypertension    • Lung nodule    • Prediabetes       Past Surgical History:     History reviewed. No pertinent surgical history. Family History:     History reviewed. No pertinent family history. Social History:     Social History     Socioeconomic History   • Marital status: /Civil Union     Spouse name: None   • Number of children: None   • Years of education: None   • Highest education level: None   Occupational History   • None   Tobacco Use   • Smoking status: Every Day     Packs/day: 0.50     Types: Cigarettes     Start date: 1988     Passive exposure: Never   • Smokeless tobacco: Never   Substance and Sexual Activity   • Alcohol use:  Yes     Alcohol/week: 14.0 standard drinks of alcohol     Types: 14 Cans of beer per week   • Drug use: No   • Sexual activity: None   Other Topics Concern   • None   Social History Narrative   • None     Social Determinants of Health     Financial Resource Strain: Not on file   Food Insecurity: Not on file   Transportation Needs: Not on file   Physical Activity: Not on file   Stress: Not on file   Social Connections: Not on file   Intimate Partner Violence: Not on file   Housing Stability: Not on file      Current Medications:     Current Outpatient Medications   Medication Sig Dispense Refill   • amLODIPine (NORVASC) 5 mg tablet Take 1 tablet (5 mg total) by mouth daily for 90 days 90 tablet 3   • aspirin (ECOTRIN LOW STRENGTH) 81 mg EC tablet Take 1 tablet (81 mg total) by mouth daily 30 tablet 0 • methocarbamol (ROBAXIN) 500 mg tablet Take 1 tablet (500 mg total) by mouth 4 (four) times a day 30 tablet 0   • predniSONE 50 mg tablet take 1 tablet IN THE MORNING for 7 days       No current facility-administered medications for this visit. Allergies:     No Known Allergies   Physical Exam:     /80 (BP Location: Left arm, Patient Position: Sitting, Cuff Size: Adult)   Pulse (!) 107   Temp 97.7 °F (36.5 °C) (Temporal)   Ht 5' 8" (1.727 m)   Wt 70.9 kg (156 lb 4 oz)   SpO2 98%   BMI 23.76 kg/m²     Physical Exam  Vitals reviewed. Constitutional:       Appearance: Normal appearance. Comments: Clothing dirty    HENT:      Head: Normocephalic and atraumatic. Mouth/Throat:      Comments: Poor dentition  Eyes:      Extraocular Movements: Extraocular movements intact. Cardiovascular:      Rate and Rhythm: Normal rate and regular rhythm. Heart sounds: Normal heart sounds. Pulmonary:      Effort: Pulmonary effort is normal.      Breath sounds: Normal breath sounds. Musculoskeletal:      Comments: Slight tenderness to palpation in right calf, no edema or erythema. Skin:     Comments: Dirt under fingernails   Neurological:      General: No focal deficit present. Mental Status: He is alert and oriented to person, place, and time.       Gait: Gait abnormal.      Comments: Ambulating with crutches   Psychiatric:         Behavior: Behavior normal.          Bela Neves, DO  [address] [facility]

## 2023-07-19 NOTE — ASSESSMENT & PLAN NOTE
Lipid Panel Pending  Patient does have Lipitor on his medication list but reports that he has not been taking that medication.  Will restart Lipitor pending labs

## 2023-07-19 NOTE — ASSESSMENT & PLAN NOTE
Patient is a 47 y/o M with poor medical compliance. Has not seen a physician in many years. He has a pmhx of Tobacco dependence, Prediabetes, HLD and HTN. Patient presents today to establish care  Routine blood work has been ordered  [address] has been ordered  Fecal Occult testing has been ordered.

## 2023-07-19 NOTE — LETTER
July 19, 2023     Patient: Jagdish Taylor  YOB: 1970  Date of Visit: 7/19/2023      To Whom it May Concern:    Jagdish Taylor is under my professional care. Alyssia Shanks was seen in my office on 7/19/2023. Alyssia Shanks may return to work on 07/24/2023 . If you have any questions or concerns, please don't hesitate to call.          Sincerely,          Mancil Lesches,         CC: No Recipients

## 2023-07-19 NOTE — ASSESSMENT & PLAN NOTE
Given patient hx of prediabetes, HTN, HLD and Tobacco dependence, symptoms seem to be related to PVD/Claudication. Will check CRISTEL  Will check RLE xray  Recommend starting ASA  Continue Norvasc for BP control   Will start statin pending Lipid Panel    Not work provided, and PT referral placed.

## 2023-07-19 NOTE — ASSESSMENT & PLAN NOTE
20 pack year smoking Hx  Patient is ready to quit at this time and I have explained that smoking will increase his risk of cardiac events  I have offered medical treatments to assist with tobacco cessation but patient declines at this time.      States that he would like to quit cold turkey

## 2023-07-20 ENCOUNTER — TELEPHONE (OUTPATIENT)
Dept: FAMILY MEDICINE CLINIC | Facility: CLINIC | Age: 53
End: 2023-07-20

## 2023-07-20 NOTE — TELEPHONE ENCOUNTER
Left a detailed message on patient's voicemail informing him to STOP prednisone and to please get the X-ray done as soon as possible

## 2023-07-21 ENCOUNTER — APPOINTMENT (OUTPATIENT)
Dept: LAB | Facility: CLINIC | Age: 53
End: 2023-07-21
Payer: COMMERCIAL

## 2023-07-21 ENCOUNTER — APPOINTMENT (OUTPATIENT)
Dept: RADIOLOGY | Facility: CLINIC | Age: 53
End: 2023-07-21
Payer: COMMERCIAL

## 2023-07-21 DIAGNOSIS — I73.9 CLAUDICATION (HCC): ICD-10-CM

## 2023-07-21 DIAGNOSIS — Z13.1 DIABETES MELLITUS SCREENING: ICD-10-CM

## 2023-07-21 DIAGNOSIS — M79.604 PAIN IN RIGHT LEG: ICD-10-CM

## 2023-07-21 DIAGNOSIS — Z11.4 SCREENING FOR HIV (HUMAN IMMUNODEFICIENCY VIRUS): ICD-10-CM

## 2023-07-21 DIAGNOSIS — Z13.220 LIPID SCREENING: ICD-10-CM

## 2023-07-21 LAB
ALBUMIN SERPL BCP-MCNC: 3.6 G/DL (ref 3.5–5)
ALP SERPL-CCNC: 79 U/L (ref 46–116)
ALT SERPL W P-5'-P-CCNC: 180 U/L (ref 12–78)
ANION GAP SERPL CALCULATED.3IONS-SCNC: 3 MMOL/L
AST SERPL W P-5'-P-CCNC: 305 U/L (ref 5–45)
BASOPHILS # BLD AUTO: 0.09 THOUSANDS/ÂΜL (ref 0–0.1)
BASOPHILS NFR BLD AUTO: 1 % (ref 0–1)
BILIRUB SERPL-MCNC: 0.71 MG/DL (ref 0.2–1)
BUN SERPL-MCNC: 17 MG/DL (ref 5–25)
CALCIUM SERPL-MCNC: 9.3 MG/DL (ref 8.3–10.1)
CHLORIDE SERPL-SCNC: 104 MMOL/L (ref 96–108)
CHOLEST SERPL-MCNC: 194 MG/DL
CO2 SERPL-SCNC: 30 MMOL/L (ref 21–32)
CREAT SERPL-MCNC: 0.87 MG/DL (ref 0.6–1.3)
EOSINOPHIL # BLD AUTO: 0.21 THOUSAND/ÂΜL (ref 0–0.61)
EOSINOPHIL NFR BLD AUTO: 1 % (ref 0–6)
ERYTHROCYTE [DISTWIDTH] IN BLOOD BY AUTOMATED COUNT: 14.1 % (ref 11.6–15.1)
EST. AVERAGE GLUCOSE BLD GHB EST-MCNC: 120 MG/DL
GFR SERPL CREATININE-BSD FRML MDRD: 97 ML/MIN/1.73SQ M
GLUCOSE P FAST SERPL-MCNC: 97 MG/DL (ref 65–99)
HBA1C MFR BLD: 5.8 %
HCT VFR BLD AUTO: 51.8 % (ref 36.5–49.3)
HDLC SERPL-MCNC: 48 MG/DL
HGB BLD-MCNC: 17.8 G/DL (ref 12–17)
HIV 1+2 AB+HIV1 P24 AG SERPL QL IA: NORMAL
HIV 2 AB SERPL QL IA: NORMAL
HIV1 AB SERPL QL IA: NORMAL
HIV1 P24 AG SERPL QL IA: NORMAL
IMM GRANULOCYTES # BLD AUTO: 0.06 THOUSAND/UL (ref 0–0.2)
IMM GRANULOCYTES NFR BLD AUTO: 0 % (ref 0–2)
LDLC SERPL CALC-MCNC: 117 MG/DL (ref 0–100)
LYMPHOCYTES # BLD AUTO: 6.72 THOUSANDS/ÂΜL (ref 0.6–4.47)
LYMPHOCYTES NFR BLD AUTO: 42 % (ref 14–44)
MCH RBC QN AUTO: 27.9 PG (ref 26.8–34.3)
MCHC RBC AUTO-ENTMCNC: 34.4 G/DL (ref 31.4–37.4)
MCV RBC AUTO: 81 FL (ref 82–98)
MONOCYTES # BLD AUTO: 1.05 THOUSAND/ÂΜL (ref 0.17–1.22)
MONOCYTES NFR BLD AUTO: 7 % (ref 4–12)
NEUTROPHILS # BLD AUTO: 8.02 THOUSANDS/ÂΜL (ref 1.85–7.62)
NEUTS SEG NFR BLD AUTO: 49 % (ref 43–75)
NONHDLC SERPL-MCNC: 146 MG/DL
NRBC BLD AUTO-RTO: 0 /100 WBCS
PLATELET # BLD AUTO: 481 THOUSANDS/UL (ref 149–390)
PMV BLD AUTO: 8.6 FL (ref 8.9–12.7)
POTASSIUM SERPL-SCNC: 3.9 MMOL/L (ref 3.5–5.3)
PROT SERPL-MCNC: 8.1 G/DL (ref 6.4–8.4)
RBC # BLD AUTO: 6.37 MILLION/UL (ref 3.88–5.62)
SODIUM SERPL-SCNC: 137 MMOL/L (ref 135–147)
TRIGL SERPL-MCNC: 144 MG/DL
WBC # BLD AUTO: 16.15 THOUSAND/UL (ref 4.31–10.16)

## 2023-07-21 PROCEDURE — 36415 COLL VENOUS BLD VENIPUNCTURE: CPT

## 2023-07-21 PROCEDURE — 83036 HEMOGLOBIN GLYCOSYLATED A1C: CPT

## 2023-07-21 PROCEDURE — 80061 LIPID PANEL: CPT

## 2023-07-21 PROCEDURE — 73590 X-RAY EXAM OF LOWER LEG: CPT

## 2023-07-21 PROCEDURE — 87389 HIV-1 AG W/HIV-1&-2 AB AG IA: CPT

## 2023-07-21 PROCEDURE — 85025 COMPLETE CBC W/AUTO DIFF WBC: CPT

## 2023-07-21 PROCEDURE — 80053 COMPREHEN METABOLIC PANEL: CPT

## 2023-07-24 ENCOUNTER — TELEPHONE (OUTPATIENT)
Dept: FAMILY MEDICINE CLINIC | Facility: CLINIC | Age: 53
End: 2023-07-24

## 2023-07-24 ENCOUNTER — RA CDI HCC (OUTPATIENT)
Dept: OTHER | Facility: HOSPITAL | Age: 53
End: 2023-07-24

## 2023-07-24 DIAGNOSIS — R74.8 ELEVATED LIVER ENZYMES: Primary | ICD-10-CM

## 2023-07-24 NOTE — PROGRESS NOTES
[address] [facility] coding opportunities       Chart reviewed, no opportunity found: CHART REVIEWED, NO OPPORTUNITY FOUND        Patients Insurance        Commercial Insurance: [employer]

## 2023-07-24 NOTE — TELEPHONE ENCOUNTER
Patient's wife informed. Patient states that his job states that he can not risk being at work unable to walk and they are requesting a new not stating that he should be out until further notice. ------ Ismael Ly DO sent at 7/24/2023  1:53 PM EDT -----  Patients liver enzymes are really high. If he takes any tylenol or drinks any alcohol he should discontinue immediately. I am ordering an Abd US to take a look at his liver and gallbladder. He should call to schedule at his convenience.

## 2023-07-28 ENCOUNTER — HOSPITAL ENCOUNTER (INPATIENT)
Facility: HOSPITAL | Age: 53
LOS: 8 days | Discharge: HOME HEALTH | DRG: 253 | End: 2023-08-05
Attending: INTERNAL MEDICINE | Admitting: INTERNAL MEDICINE
Payer: COMMERCIAL

## 2023-07-28 ENCOUNTER — HOSPITAL ENCOUNTER (OUTPATIENT)
Dept: NON INVASIVE DIAGNOSTICS | Facility: CLINIC | Age: 53
Discharge: HOME | End: 2023-07-28
Attending: STUDENT IN AN ORGANIZED HEALTH CARE EDUCATION/TRAINING PROGRAM
Payer: COMMERCIAL

## 2023-07-28 ENCOUNTER — HOSPITAL ENCOUNTER (EMERGENCY)
Facility: HOSPITAL | Age: 53
End: 2023-07-28
Attending: EMERGENCY MEDICINE
Payer: COMMERCIAL

## 2023-07-28 VITALS
DIASTOLIC BLOOD PRESSURE: 77 MMHG | SYSTOLIC BLOOD PRESSURE: 166 MMHG | HEART RATE: 76 BPM | OXYGEN SATURATION: 100 % | TEMPERATURE: 98 F | RESPIRATION RATE: 16 BRPM

## 2023-07-28 DIAGNOSIS — I70.90 ARTERIAL OCCLUSION: Primary | ICD-10-CM

## 2023-07-28 DIAGNOSIS — I73.9 CLAUDICATION (HCC): ICD-10-CM

## 2023-07-28 DIAGNOSIS — I70.90 ARTERIAL OCCLUSION: ICD-10-CM

## 2023-07-28 DIAGNOSIS — M79.604 PAIN IN RIGHT LEG: Primary | ICD-10-CM

## 2023-07-28 LAB
ANION GAP SERPL CALCULATED.3IONS-SCNC: 6 MMOL/L
APTT PPP: 32 SECONDS (ref 23–37)
APTT PPP: >210 SECONDS (ref 23–37)
BUN SERPL-MCNC: 15 MG/DL (ref 5–25)
CALCIUM SERPL-MCNC: 10.4 MG/DL (ref 8.4–10.2)
CHLORIDE SERPL-SCNC: 100 MMOL/L (ref 96–108)
CO2 SERPL-SCNC: 28 MMOL/L (ref 21–32)
CREAT SERPL-MCNC: 0.77 MG/DL (ref 0.6–1.3)
ERYTHROCYTE [DISTWIDTH] IN BLOOD BY AUTOMATED COUNT: 13.2 % (ref 11.6–15.1)
GFR SERPL CREATININE-BSD FRML MDRD: 102 ML/MIN/1.73SQ M
GLUCOSE SERPL-MCNC: 100 MG/DL (ref 65–140)
HCT VFR BLD AUTO: 48 % (ref 36.5–49.3)
HGB BLD-MCNC: 16.4 G/DL (ref 12–17)
INR PPP: 1.08 (ref 0.84–1.19)
MCH RBC QN AUTO: 27.6 PG (ref 26.8–34.3)
MCHC RBC AUTO-ENTMCNC: 34.2 G/DL (ref 31.4–37.4)
MCV RBC AUTO: 81 FL (ref 82–98)
PLATELET # BLD AUTO: 595 THOUSANDS/UL (ref 149–390)
PMV BLD AUTO: 8.2 FL (ref 8.9–12.7)
POTASSIUM SERPL-SCNC: 4 MMOL/L (ref 3.5–5.3)
PROTHROMBIN TIME: 13.8 SECONDS (ref 11.6–14.5)
RBC # BLD AUTO: 5.95 MILLION/UL (ref 3.88–5.62)
SODIUM SERPL-SCNC: 134 MMOL/L (ref 135–147)
WBC # BLD AUTO: 12.68 THOUSAND/UL (ref 4.31–10.16)

## 2023-07-28 PROCEDURE — 85610 PROTHROMBIN TIME: CPT | Performed by: EMERGENCY MEDICINE

## 2023-07-28 PROCEDURE — 85730 THROMBOPLASTIN TIME PARTIAL: CPT | Performed by: EMERGENCY MEDICINE

## 2023-07-28 PROCEDURE — 96366 THER/PROPH/DIAG IV INF ADDON: CPT

## 2023-07-28 PROCEDURE — 85027 COMPLETE CBC AUTOMATED: CPT | Performed by: EMERGENCY MEDICINE

## 2023-07-28 PROCEDURE — 99291 CRITICAL CARE FIRST HOUR: CPT | Performed by: EMERGENCY MEDICINE

## 2023-07-28 PROCEDURE — 96365 THER/PROPH/DIAG IV INF INIT: CPT

## 2023-07-28 PROCEDURE — 80048 BASIC METABOLIC PNL TOTAL CA: CPT | Performed by: EMERGENCY MEDICINE

## 2023-07-28 PROCEDURE — 93926 LOWER EXTREMITY STUDY: CPT | Performed by: SURGERY

## 2023-07-28 PROCEDURE — 36415 COLL VENOUS BLD VENIPUNCTURE: CPT | Performed by: EMERGENCY MEDICINE

## 2023-07-28 PROCEDURE — 99285 EMERGENCY DEPT VISIT HI MDM: CPT

## 2023-07-28 PROCEDURE — 93926 LOWER EXTREMITY STUDY: CPT

## 2023-07-28 PROCEDURE — 96376 TX/PRO/DX INJ SAME DRUG ADON: CPT

## 2023-07-28 PROCEDURE — 99223 1ST HOSP IP/OBS HIGH 75: CPT | Performed by: INTERNAL MEDICINE

## 2023-07-28 PROCEDURE — 93922 UPR/L XTREMITY ART 2 LEVELS: CPT | Performed by: SURGERY

## 2023-07-28 RX ORDER — HEPARIN SODIUM 1000 [USP'U]/ML
5600 INJECTION, SOLUTION INTRAVENOUS; SUBCUTANEOUS EVERY 6 HOURS PRN
Status: DISCONTINUED | OUTPATIENT
Start: 2023-07-28 | End: 2023-07-28 | Stop reason: HOSPADM

## 2023-07-28 RX ORDER — NICOTINE 21 MG/24HR
1 PATCH, TRANSDERMAL 24 HOURS TRANSDERMAL DAILY
Status: DISCONTINUED | OUTPATIENT
Start: 2023-07-29 | End: 2023-08-05 | Stop reason: HOSPADM

## 2023-07-28 RX ORDER — HEPARIN SODIUM 10000 [USP'U]/100ML
3-30 INJECTION, SOLUTION INTRAVENOUS
Status: DISCONTINUED | OUTPATIENT
Start: 2023-07-28 | End: 2023-07-29

## 2023-07-28 RX ORDER — HYDRALAZINE HYDROCHLORIDE 20 MG/ML
10 INJECTION INTRAMUSCULAR; INTRAVENOUS EVERY 6 HOURS PRN
Status: DISCONTINUED | OUTPATIENT
Start: 2023-07-28 | End: 2023-08-05 | Stop reason: HOSPADM

## 2023-07-28 RX ORDER — OXYCODONE HYDROCHLORIDE 5 MG/1
5 TABLET ORAL EVERY 4 HOURS PRN
Status: DISCONTINUED | OUTPATIENT
Start: 2023-07-28 | End: 2023-08-01

## 2023-07-28 RX ORDER — HYDROMORPHONE HCL/PF 1 MG/ML
0.5 SYRINGE (ML) INJECTION
Status: DISCONTINUED | OUTPATIENT
Start: 2023-07-28 | End: 2023-08-01

## 2023-07-28 RX ORDER — HEPARIN SODIUM 1000 [USP'U]/ML
5600 INJECTION, SOLUTION INTRAVENOUS; SUBCUTANEOUS ONCE
Status: COMPLETED | OUTPATIENT
Start: 2023-07-28 | End: 2023-07-28

## 2023-07-28 RX ORDER — HEPARIN SODIUM 1000 [USP'U]/ML
2800 INJECTION, SOLUTION INTRAVENOUS; SUBCUTANEOUS EVERY 6 HOURS PRN
Status: DISCONTINUED | OUTPATIENT
Start: 2023-07-28 | End: 2023-07-28 | Stop reason: HOSPADM

## 2023-07-28 RX ORDER — ASPIRIN 81 MG/1
81 TABLET, CHEWABLE ORAL DAILY
Status: DISCONTINUED | OUTPATIENT
Start: 2023-07-29 | End: 2023-08-02

## 2023-07-28 RX ORDER — AMLODIPINE BESYLATE 5 MG/1
5 TABLET ORAL DAILY
Status: DISCONTINUED | OUTPATIENT
Start: 2023-07-29 | End: 2023-08-05 | Stop reason: HOSPADM

## 2023-07-28 RX ORDER — ASPIRIN 81 MG/1
81 TABLET, CHEWABLE ORAL DAILY
Status: DISCONTINUED | OUTPATIENT
Start: 2023-07-28 | End: 2023-07-28

## 2023-07-28 RX ORDER — FENTANYL CITRATE 50 UG/ML
50 INJECTION, SOLUTION INTRAMUSCULAR; INTRAVENOUS ONCE
Status: DISCONTINUED | OUTPATIENT
Start: 2023-07-28 | End: 2023-07-28 | Stop reason: HOSPADM

## 2023-07-28 RX ORDER — HEPARIN SODIUM 10000 [USP'U]/100ML
3-30 INJECTION, SOLUTION INTRAVENOUS
Status: DISCONTINUED | OUTPATIENT
Start: 2023-07-28 | End: 2023-07-28 | Stop reason: HOSPADM

## 2023-07-28 RX ADMIN — HEPARIN SODIUM 18 UNITS/KG/HR: 10000 INJECTION, SOLUTION INTRAVENOUS at 23:48

## 2023-07-28 RX ADMIN — OXYCODONE HYDROCHLORIDE 5 MG: 5 TABLET ORAL at 23:42

## 2023-07-28 RX ADMIN — HEPARIN SODIUM 18 UNITS/KG/HR: 10000 INJECTION, SOLUTION INTRAVENOUS at 16:09

## 2023-07-28 RX ADMIN — HEPARIN SODIUM 5600 UNITS: 1000 INJECTION INTRAVENOUS; SUBCUTANEOUS at 16:06

## 2023-07-28 NOTE — EMTALA/ACUTE CARE TRANSFER
Children under the age of 2 years will be restrained in a rear facing child safety seat.   If you have an active [facility] account, please look for your well child questionnaire to come to your [facility] account before your next well child visit.    Well-Child Checkup: 12 Months     At this age, your baby may take his or her first steps. Although some babies take their first steps when they are younger and some when they are older.      At the 12-month checkup, the healthcare provider will examine the child and ask how things are going at home. This sheet describes some of what you can expect.  Development and milestones  The healthcare provider will ask questions about your child. He or she will observe your toddler to get an idea of the childs development. By this visit, your child is likely doing some of the following:  · Pulling up to a standing position  · Moving around while holding on to the couch or other furniture (known as cruising)  · Taking steps independently  · Putting objects in and takes them out of a container  · Using the first or pointer finger and thumb to grasp small objects  · Starting to understand what youre saying  · Saying Mama and Jac  Feeding tips  At 12 months of age, its normal for a child to eat 3 meals and a few snacks each day. If your child doesnt want to eat, thats OK. Provide food at mealtime, and your child will eat if and when he or she is hungry. Do not force the child to eat. To help your child eat well:  · Gradually give the child whole milk instead of feeding breastmilk or formula. If youre breastfeeding, continue or wean as you and your child are ready, but also start giving your child whole milk The dietary fat contained in whole milk is necessary for proper brain development and should be given to toddlers from ages 1 to 2 years.  · Make solids your childs main source of nutrients. Milk should be thought of as a beverage, not a full meal.  · Begin to  [facility] [address]  Dept: [phone]      EMTALA TRANSFER CONSENT    NAME Angie Alcala                                         1970                              MRN [id number]    I have been informed of my rights regarding examination, treatment, and transfer   by Dr. Catherine Banegas MD    Benefits: Specialized equipment and/or services available at the receiving facility (Include comment)________________________    Risks: Potential for delay in receiving treatment      Consent for Transfer:  I acknowledge that my medical condition has been evaluated and explained to me by the emergency department physician or other qualified medical person and/or my attending physician, who has recommended that I be transferred to the service of  Accepting Physician: Katy Sr at [address] [facility] [address] Name, [address] : [facility]. The above potential benefits of such transfer, the potential risks associated with such transfer, and the probable risks of not being transferred have been explained to me, and I fully understand them. The doctor has explained that, in my case, the benefits of transfer outweigh the risks. I agree to be transferred. I authorize the performance of emergency medical procedures and treatments upon me in both transit and upon arrival at the receiving facility. Additionally, I authorize the release of any and all medical records to the receiving facility and request they be transported with me, if possible. I understand that the safest mode of transportation during a medical emergency is an ambulance and that the Hospital advocates the use of this mode of transport. Risks of traveling to the receiving facility by car, including absence of medical control, life sustaining equipment, such as oxygen, and medical personnel has been explained to me and I fully understand them.     (VERONIKA CORRECT BOX BELOW)  [  ]  I consent to the replace a bottle with a sippy cup for all liquids. Plan to wean your child off the bottle by 15 months of age.  · Avoid foods your child might choke on. This is common with foods about the size and shape of the childs throat. They include sections of hot dogs and sausages, hard candies, nuts, whole grapes, and raw vegetables. Ask the healthcare provider about other foods to avoid.  · At 12 months of age its OK to give your child honey.  · Ask the healthcare provider if your baby needs fluoride supplements.  Hygiene tips  · If your child has teeth, gently brush them at least twice a day (such as after breakfast and before bed). Use a small amount of fluoride toothpaste (no larger than a grain of rice) and a baby's toothbrush with soft bristles.   · Ask the healthcare provider when your child should have his or her first dental visit. Most pediatric dentists recommend that the first dental visit should happen within 6 months after the first tooth erupts above the gums, but no later than the child's first birthday.   Sleeping tips  At this age, your child will likely nap around 1 to 3 hours each day, and sleep 10 to 12 hours at night. If your child sleeps more or less than this but seems healthy, it is not a concern. To help your child sleep:  · Get the child used to doing the same things each night before bed. Having a bedtime routine helps your child learn when its time to go to sleep. Try to stick to the same bedtime each night.  · Do not put your child to bed with anything to drink.  · Make sure the crib mattress is on the lowest setting. This helps keep your child from pulling up and climbing or falling out of the crib. If your child is still able to climb out of the crib, use a crib tent, put the mattress on the floor, or switch to a toddler bed.   · If getting the child to sleep through the night is a problem, ask the healthcare provider for tips.  Safety tips  As your child becomes more mobile, active  stated transfer and to be transported by ambulance/helicopter. [  ]  I consent to the stated transfer, but refuse transportation by ambulance and accept full responsibility for my transportation by car. I understand the risks of non-ambulance transfers and I exonerate the Hospital and its staff from any deterioration in my condition that results from this refusal.    X___________________________________________    DATE  23  TIME________  Signature of patient or legally responsible individual signing on patient behalf           RELATIONSHIP TO PATIENT_________________________          Provider Certification    NAME Uday RUIZ 1970                              MRN [id number]    A medical screening exam was performed on the above named patient. Based on the examination:    Condition Necessitating Transfer The encounter diagnosis was Arterial occlusion. Patient Condition: The patient has been stabilized such that within reasonable medical probability, no material deterioration of the patient condition or the condition of the unborn child(loren) is likely to result from the transfer    Reason for Transfer: Level of Care needed not available at this facility    Transfer Requirements: Facility [facility]   · Space available and qualified personnel available for treatment as acknowledged by    · Agreed to accept transfer and to provide appropriate medical treatment as acknowledged by       [facility]  · Appropriate medical records of the examination and treatment of the patient are provided at the time of transfer   [address] _______  · Transfer will be performed by qualified personnel from    and appropriate transfer equipment as required, including the use of necessary and appropriate life support measures.     Provider Certification: I have examined the patient and explained the following risks and benefits of being transferred/refusing transfer to the patient/family:  General risk, such as traffic hazards, adverse weather conditions, rough terrain or turbulence, possible failure of equipment (including vehicle or aircraft), or consequences of actions of persons outside the control of the transport personnel, Unanticipated needs of medical equipment and personnel during transport      Based on these reasonable risks and benefits to the patient and/or the unborn child(loren), and based upon the information available at the time of the patient’s examination, I certify that the medical benefits reasonably to be expected from the provision of appropriate medical treatments at another medical facility outweigh the increasing risks, if any, to the individual’s medical condition, and in the case of labor to the unborn child, from effecting the transfer.     X____________________________________________ DATE 07/28/23        TIME_______      ORIGINAL - SEND TO MEDICAL RECORDS   COPY - SEND WITH PATIENT DURING TRANSFER supervision is crucial. Always be aware of what your child is doing. An accident can happen in a split second. To keep your baby safe:   · If you have not already done so, childproof the house. If your toddler is pulling up on furniture or cruising (moving around while holding on to objects), be sure that big pieces, such as cabinets and TVs, are tied down or secured to the wall. Otherwise they may be pulled down on top of the child. Move any items that might hurt the child out of his or her reach. Be aware of items like tablecloths or cords that your baby might pull on. Do a safety check of any area your baby spends time in.  · Protect your toddler from falls with sturdy screens on windows and viveros at the tops and bottoms of staircases. Supervise your child on the stairs.  · Dont let your baby get hold of anything small enough to choke on. This includes toys, solid foods, and items on the floor that the child may find while crawling or cruising. As a rule, an item small enough to fit inside a toilet paper tube can cause a child to choke.  · In the car, always put the child in a rear-facing child safety seat in the back seat. Even if your child weighs more than 20 pounds, he or she should still face backward. In fact, it's safest to face backward until age 2 years. Ask the healthcare provider if you have questions.  · At this age many children become curious around dogs, cats, and other animals. Teach your child to be gentle and cautious with animals. Always supervise the child around animals, even familiar family pets.  · Keep this Poison Control phone number in an easy-to-see place, such as on the refrigerator: [phone].  Vaccines  Based on recommendations from the CDC, at this visit your child may receive the following vaccines:  · Haemophilus influenzae type b  · Hepatitis A  · Hepatitis B  · Influenza (flu)  · Measles, mumps, and rubella  · Pneumococcus  · Polio  · Varicella (chickenpox)  Choosing  shoes  Your 1-year-old may be walking. Now is the time to invest in a good pair of shoes. Here are some tips:  · To make sure you get the right size, ask a  for help measuring your childs feet. Dont buy shoes that are too big, for your child to grow into. When shoes dont fit, walking is harder.  · Look for shoes with soft, flexible soles.  · Avoid high ankles and stiff leather. These can be uncomfortable and can interfere with walking.  · Choose shoes that are easy to get on and off, yet wont slide off your childs feet accidentally. Moccasins or sneakers with Velcro closures are good choices.        Next checkup at: _______________________________     PARENT NOTES:  Date Last Reviewed: 12/1/2016  © 0188-7101 [employer]. [address] [place] [address], [place] [address]. All rights reserved. This information is not intended as a substitute for professional medical care. Always follow your healthcare professional's instructions.

## 2023-07-28 NOTE — ED PROVIDER NOTES
History  Chief Complaint   Patient presents with   • Evaluation of Abnormal Diagnostic Test     Per pt he is sent here by vascular for a blood clot in his right leg. HPI  50-year-old male with past medical history of hypertension, tobacco abuse presents with abnormal arterial duplex of the right lower extremity that was performed today. He has had 2 weeks of claudication to right lower extremity. Duplex shows acute occlusion of the mid and distal superficial femoral artery and the distal anterior tibial artery. Patient is experiencing pain in the leg. Prior to Admission Medications   Prescriptions Last Dose Informant Patient Reported? Taking? amLODIPine (NORVASC) 5 mg tablet  Self No No   Sig: Take 1 tablet (5 mg total) by mouth daily for 90 days   aspirin (ECOTRIN LOW STRENGTH) 81 mg EC tablet   No No   Sig: Take 1 tablet (81 mg total) by mouth daily   methocarbamol (ROBAXIN) 500 mg tablet   No No   Sig: Take 1 tablet (500 mg total) by mouth 4 (four) times a day   predniSONE 50 mg tablet  Self Yes No   Sig: take 1 tablet IN THE MORNING for 7 days      Facility-Administered Medications: None       Past Medical History:   Diagnosis Date   • Hypertension    • Lung nodule    • Prediabetes        History reviewed. No pertinent surgical history. History reviewed. No pertinent family history. I have reviewed and agree with the history as documented. E-Cigarette/Vaping     E-Cigarette/Vaping Substances     Social History     Tobacco Use   • Smoking status: Every Day     Packs/day: 0.50     Types: Cigarettes     Start date: 1988     Passive exposure: Never   • Smokeless tobacco: Never   Substance Use Topics   • Alcohol use: Yes     Alcohol/week: 14.0 standard drinks of alcohol     Types: 14 Cans of beer per week   • Drug use: No       Review of Systems   Constitutional: Negative for chills and fever. HENT: Negative for dental problem and ear pain. Eyes: Negative for pain and redness.    Respiratory: Negative for cough and shortness of breath. Cardiovascular: Negative for chest pain and palpitations. Gastrointestinal: Negative for abdominal pain and nausea. Endocrine: Negative for polydipsia and polyphagia. Genitourinary: Negative for dysuria and frequency. Musculoskeletal: Negative for arthralgias and joint swelling.        +leg pain   Skin: Negative for color change and rash. Neurological: Negative for dizziness and headaches. Psychiatric/Behavioral: Negative for behavioral problems and confusion. All other systems reviewed and are negative. Physical Exam  Physical Exam  Vitals and nursing note reviewed. Constitutional:       General: He is not in acute distress. Appearance: He is well-developed. He is not diaphoretic. HENT:      Head: Atraumatic. Right Ear: External ear normal.      Left Ear: External ear normal.      Nose: Nose normal.   Eyes:      Conjunctiva/sclera: Conjunctivae normal.      Pupils: Pupils are equal, round, and reactive to light. Neck:      Vascular: No JVD. Cardiovascular:      Rate and Rhythm: Normal rate and regular rhythm. Heart sounds: Normal heart sounds. No murmur heard. Pulmonary:      Effort: Pulmonary effort is normal. No respiratory distress. Breath sounds: Normal breath sounds. No wheezing. Abdominal:      General: Bowel sounds are normal. There is no distension. Palpations: Abdomen is soft. Tenderness: There is no abdominal tenderness. Musculoskeletal:         General: Normal range of motion. Cervical back: Normal range of motion and neck supple. Comments: Right lower extremity is warm, positive Doppler signal in the right dorsalis pedis and posterior tibial artery   Skin:     General: Skin is warm and dry. Capillary Refill: Capillary refill takes less than 2 seconds. Neurological:      Mental Status: He is alert and oriented to person, place, and time.       Cranial Nerves: No cranial nerve deficit.    Psychiatric:         Behavior: Behavior normal.         Vital Signs  ED Triage Vitals   Temperature Pulse Respirations Blood Pressure SpO2   07/28/23 1524 07/28/23 1524 07/28/23 1524 07/28/23 1524 07/28/23 1524   98 °F (36.7 °C) 80 16 (!) 177/79 99 %      Temp src Heart Rate Source Patient Position - Orthostatic VS BP Location FiO2 (%)   -- 07/28/23 1605 -- 07/28/23 1605 --    Monitor  Left arm       Pain Score       --                  Vitals:    07/28/23 1524 07/28/23 1605 07/28/23 1630   BP: (!) 177/79 (!) 174/87 155/74   Pulse: 80 79 77         Visual Acuity      ED Medications  Medications   fentanyl citrate (PF) 100 MCG/2ML 50 mcg (0 mcg Intravenous Hold 7/28/23 1613)   heparin (porcine) 25,000 units in 0.45% NaCl 250 mL infusion (premix) (18 Units/kg/hr × 70 kg (Order-Specific) Intravenous New Bag 7/28/23 1609)   heparin (porcine) injection 5,600 Units (has no administration in time range)   heparin (porcine) injection 2,800 Units (has no administration in time range)   heparin (porcine) injection 5,600 Units (5,600 Units Intravenous Given 7/28/23 1606)       Diagnostic Studies  Results Reviewed     Procedure Component Value Units Date/Time    Basic metabolic panel [[id number]]  (Abnormal) Collected: 07/28/23 1601    Lab Status: Final result Specimen: Blood from Arm, Right Updated: 07/28/23 1637     Sodium 134 mmol/L      Potassium 4.0 mmol/L      Chloride 100 mmol/L      CO2 28 mmol/L      ANION GAP 6 mmol/L      BUN 15 mg/dL      Creatinine 0.77 mg/dL      Glucose 100 mg/dL      Calcium 10.4 mg/dL      eGFR 102 ml/min/1.73sq m     Narrative:      [place] guidelines for Chronic Kidney Disease (CKD):   •  Stage 1 with normal or high GFR (GFR > 90 mL/min/1.73 square meters)  •  Stage 2 Mild CKD (GFR = 60-89 mL/min/1.73 square meters)  •  Stage 3A Moderate CKD (GFR = 45-59 mL/min/1.73 square meters)  •  Stage 3B Moderate CKD (GFR = 30-44 mL/min/1.73 square meters)  •  Stage 4 Severe CKD (GFR = 15-29 mL/min/1.73 square meters)  •  Stage 5 End Stage CKD (GFR <15 mL/min/1.73 square meters)  Note: GFR calculation is accurate only with a steady state creatinine    Protime-INR [[id number]]  (Normal) Collected: 07/28/23 1601    Lab Status: Final result Specimen: Blood from Arm, Right Updated: 07/28/23 1632     Protime 13.8 seconds      INR 1.08    APTT [[id number]]  (Normal) Collected: 07/28/23 1601    Lab Status: Final result Specimen: Blood from Arm, Right Updated: 07/28/23 1632     PTT 32 seconds     CBC [[id number]]  (Abnormal) Collected: 07/28/23 1601    Lab Status: Final result Specimen: Blood from Arm, Right Updated: 07/28/23 1617     WBC 12.68 Thousand/uL      RBC 5.95 Million/uL      Hemoglobin 16.4 g/dL      Hematocrit 48.0 %      MCV 81 fL      MCH 27.6 pg      MCHC 34.2 g/dL      RDW 13.2 %      Platelets 630 Thousands/uL      MPV 8.2 fL                  No orders to display              Procedures  CriticalCare Time    Date/Time: 7/28/2023 5:12 PM    Performed by: Ailyn Tapia MD  Authorized by: Ailyn Tapia MD    Critical care provider statement:     Critical care time (minutes):  42    Critical care was necessary to treat or prevent imminent or life-threatening deterioration of the following conditions: Acute arterial occlusion requiring IV anticoagulation, limb threatening. ED Course                                             MDM  Upon arrival to ED, heparin drip started. I spoke with on-call vascular surgery, Yesenia Martinez who recommends transfer to [facility]. According to vascular, [facility] status is acceptable.   Disposition  Final diagnoses:   Arterial occlusion     Time reflects when diagnosis was documented in both MDM as applicable and the Disposition within this note     Time User Action Codes Description Comment    7/28/2023  4:10 PM Bria Perez Add [I70.90] Arterial occlusion       ED Disposition     ED Disposition   Transfer to Another Facility-In Network    Condition   --    Date/Time   Fri Jul 28, 2023  4:11 PM    Comment   Celeste Soto should be transferred out to [facility]. MD Documentation    Ailyn Garcia Most Recent Value   Patient Condition The patient has been stabilized such that within reasonable medical probability, no material deterioration of the patient condition or the condition of the unborn child(loren) is likely to result from the transfer   Reason for Transfer Level of Care needed not available at this facility   Benefits of Transfer Specialized equipment and/or services available at the receiving facility (Include comment)________________________   Risks of Transfer Potential for delay in receiving treatment   Accepting Physician [address] [place] [address] Name, [address]   Sending MD [facility]   Provider Certification General risk, such as traffic hazards, adverse weather conditions, rough terrain or turbulence, possible failure of equipment (including vehicle or aircraft), or consequences of actions of persons outside the control of the transport personnel, Unanticipated needs of medical equipment and personnel during transport      RN Documentation    [address] Name, [address] [facility]      Follow-up Information    None         Patient's Medications   Discharge Prescriptions    No medications on file       No discharge procedures on file.     PDMP Review       Value Time User    PDMP Reviewed  Yes 7/19/2023  9:41  W Sunny Mansfield DO          ED Provider  Electronically Signed by           Barbara Lesch, MD  07/28/23 0588

## 2023-07-28 NOTE — LETTER
[address] [place] [address] 5  [address] [address]  Dept: [phone]    August 5, 2023     Patient: Td Hartmann   YOB: 1970   Date of Visit: 7/28/2023       To Whom it May Concern:    Td Hartmann is under my professional care. He was seen in the hospital from 7/28/2023 to 08/05/23. He may NOT return to work until cleared at his follow-up appointment on 8/15/2023. If you have any questions or concerns, please don't hesitate to call.          Sincerely,      Towanda Schilder, MD

## 2023-07-29 ENCOUNTER — APPOINTMENT (INPATIENT)
Dept: RADIOLOGY | Facility: HOSPITAL | Age: 53
DRG: 253 | End: 2023-07-29
Payer: COMMERCIAL

## 2023-07-29 ENCOUNTER — ANESTHESIA EVENT (INPATIENT)
Dept: PERIOP | Facility: HOSPITAL | Age: 53
End: 2023-07-29
Payer: COMMERCIAL

## 2023-07-29 ENCOUNTER — ANESTHESIA (INPATIENT)
Dept: PERIOP | Facility: HOSPITAL | Age: 53
End: 2023-07-29
Payer: COMMERCIAL

## 2023-07-29 LAB
ALBUMIN SERPL BCP-MCNC: 3.5 G/DL (ref 3.5–5)
ALP SERPL-CCNC: 94 U/L (ref 46–116)
ALT SERPL W P-5'-P-CCNC: 83 U/L (ref 12–78)
ANION GAP SERPL CALCULATED.3IONS-SCNC: 2 MMOL/L
ANION GAP SERPL CALCULATED.3IONS-SCNC: 4 MMOL/L
APTT PPP: 123 SECONDS (ref 23–37)
APTT PPP: 41 SECONDS (ref 23–37)
APTT PPP: 51 SECONDS (ref 23–37)
AST SERPL W P-5'-P-CCNC: 69 U/L (ref 5–45)
BASOPHILS # BLD AUTO: 0.13 THOUSANDS/ÂΜL (ref 0–0.1)
BASOPHILS NFR BLD AUTO: 1 % (ref 0–1)
BILIRUB SERPL-MCNC: 0.81 MG/DL (ref 0.2–1)
BUN SERPL-MCNC: 15 MG/DL (ref 5–25)
BUN SERPL-MCNC: 18 MG/DL (ref 5–25)
CALCIUM SERPL-MCNC: 9.1 MG/DL (ref 8.3–10.1)
CALCIUM SERPL-MCNC: 9.4 MG/DL (ref 8.3–10.1)
CHLORIDE SERPL-SCNC: 103 MMOL/L (ref 96–108)
CHLORIDE SERPL-SCNC: 106 MMOL/L (ref 96–108)
CO2 SERPL-SCNC: 27 MMOL/L (ref 21–32)
CO2 SERPL-SCNC: 28 MMOL/L (ref 21–32)
CREAT SERPL-MCNC: 0.77 MG/DL (ref 0.6–1.3)
CREAT SERPL-MCNC: 0.81 MG/DL (ref 0.6–1.3)
EOSINOPHIL # BLD AUTO: 0.31 THOUSAND/ÂΜL (ref 0–0.61)
EOSINOPHIL NFR BLD AUTO: 3 % (ref 0–6)
ERYTHROCYTE [DISTWIDTH] IN BLOOD BY AUTOMATED COUNT: 13.3 % (ref 11.6–15.1)
ERYTHROCYTE [DISTWIDTH] IN BLOOD BY AUTOMATED COUNT: 13.4 % (ref 11.6–15.1)
GFR SERPL CREATININE-BSD FRML MDRD: 100 ML/MIN/1.73SQ M
GFR SERPL CREATININE-BSD FRML MDRD: 102 ML/MIN/1.73SQ M
GLUCOSE SERPL-MCNC: 105 MG/DL (ref 65–140)
GLUCOSE SERPL-MCNC: 106 MG/DL (ref 65–140)
HCT VFR BLD AUTO: 45.2 % (ref 36.5–49.3)
HCT VFR BLD AUTO: 47 % (ref 36.5–49.3)
HGB BLD-MCNC: 14.9 G/DL (ref 12–17)
HGB BLD-MCNC: 16.4 G/DL (ref 12–17)
IMM GRANULOCYTES # BLD AUTO: 0.03 THOUSAND/UL (ref 0–0.2)
IMM GRANULOCYTES NFR BLD AUTO: 0 % (ref 0–2)
INR PPP: 1.03 (ref 0.84–1.19)
LYMPHOCYTES # BLD AUTO: 3.66 THOUSANDS/ÂΜL (ref 0.6–4.47)
LYMPHOCYTES NFR BLD AUTO: 31 % (ref 14–44)
MCH RBC QN AUTO: 27.6 PG (ref 26.8–34.3)
MCH RBC QN AUTO: 28.3 PG (ref 26.8–34.3)
MCHC RBC AUTO-ENTMCNC: 33 G/DL (ref 31.4–37.4)
MCHC RBC AUTO-ENTMCNC: 34.9 G/DL (ref 31.4–37.4)
MCV RBC AUTO: 81 FL (ref 82–98)
MCV RBC AUTO: 84 FL (ref 82–98)
MONOCYTES # BLD AUTO: 1.03 THOUSAND/ÂΜL (ref 0.17–1.22)
MONOCYTES NFR BLD AUTO: 9 % (ref 4–12)
NEUTROPHILS # BLD AUTO: 6.49 THOUSANDS/ÂΜL (ref 1.85–7.62)
NEUTS SEG NFR BLD AUTO: 56 % (ref 43–75)
NRBC BLD AUTO-RTO: 0 /100 WBCS
PLATELET # BLD AUTO: 553 THOUSANDS/UL (ref 149–390)
PLATELET # BLD AUTO: 556 THOUSANDS/UL (ref 149–390)
PMV BLD AUTO: 8.1 FL (ref 8.9–12.7)
PMV BLD AUTO: 8.2 FL (ref 8.9–12.7)
POTASSIUM SERPL-SCNC: 4 MMOL/L (ref 3.5–5.3)
POTASSIUM SERPL-SCNC: 4.2 MMOL/L (ref 3.5–5.3)
PROT SERPL-MCNC: 8.4 G/DL (ref 6.4–8.4)
PROTHROMBIN TIME: 13.7 SECONDS (ref 11.6–14.5)
RBC # BLD AUTO: 5.4 MILLION/UL (ref 3.88–5.62)
RBC # BLD AUTO: 5.8 MILLION/UL (ref 3.88–5.62)
SODIUM SERPL-SCNC: 134 MMOL/L (ref 135–147)
SODIUM SERPL-SCNC: 136 MMOL/L (ref 135–147)
WBC # BLD AUTO: 11.34 THOUSAND/UL (ref 4.31–10.16)
WBC # BLD AUTO: 11.65 THOUSAND/UL (ref 4.31–10.16)

## 2023-07-29 PROCEDURE — C1769 GUIDE WIRE: HCPCS | Performed by: SURGERY

## 2023-07-29 PROCEDURE — 85730 THROMBOPLASTIN TIME PARTIAL: CPT | Performed by: FAMILY MEDICINE

## 2023-07-29 PROCEDURE — 85730 THROMBOPLASTIN TIME PARTIAL: CPT | Performed by: INTERNAL MEDICINE

## 2023-07-29 PROCEDURE — 85025 COMPLETE CBC W/AUTO DIFF WBC: CPT | Performed by: INTERNAL MEDICINE

## 2023-07-29 PROCEDURE — 0KNS0ZZ RELEASE RIGHT LOWER LEG MUSCLE, OPEN APPROACH: ICD-10-PCS | Performed by: SURGERY

## 2023-07-29 PROCEDURE — 27600 DECOMPRESSION OF LOWER LEG: CPT | Performed by: SURGERY

## 2023-07-29 PROCEDURE — 75710 ARTERY X-RAYS ARM/LEG: CPT

## 2023-07-29 PROCEDURE — 85027 COMPLETE CBC AUTOMATED: CPT | Performed by: FAMILY MEDICINE

## 2023-07-29 PROCEDURE — 80048 BASIC METABOLIC PNL TOTAL CA: CPT | Performed by: INTERNAL MEDICINE

## 2023-07-29 PROCEDURE — 75635 CT ANGIO ABDOMINAL ARTERIES: CPT

## 2023-07-29 PROCEDURE — C1874 STENT, COATED/COV W/DEL SYS: HCPCS | Performed by: SURGERY

## 2023-07-29 PROCEDURE — 99232 SBSQ HOSP IP/OBS MODERATE 35: CPT | Performed by: FAMILY MEDICINE

## 2023-07-29 PROCEDURE — 80053 COMPREHEN METABOLIC PANEL: CPT | Performed by: INTERNAL MEDICINE

## 2023-07-29 PROCEDURE — NC001 PR NO CHARGE: Performed by: RADIOLOGY

## 2023-07-29 PROCEDURE — 85610 PROTHROMBIN TIME: CPT | Performed by: FAMILY MEDICINE

## 2023-07-29 PROCEDURE — 047K04Z DILATION OF RIGHT FEMORAL ARTERY WITH DRUG-ELUTING INTRALUMINAL DEVICE, OPEN APPROACH: ICD-10-PCS | Performed by: SURGERY

## 2023-07-29 PROCEDURE — 85730 THROMBOPLASTIN TIME PARTIAL: CPT | Performed by: STUDENT IN AN ORGANIZED HEALTH CARE EDUCATION/TRAINING PROGRAM

## 2023-07-29 PROCEDURE — 37226 PR REVSC OPN/PRQ FEM/POP W/STNT/ANGIOP SM VSL: CPT | Performed by: SURGERY

## 2023-07-29 PROCEDURE — C1725 CATH, TRANSLUMIN NON-LASER: HCPCS | Performed by: SURGERY

## 2023-07-29 DEVICE — VIABAHN SX ENDO HEPARIN 18 RO 6MMX5CM 6FR 120CM CATH
Type: IMPLANTABLE DEVICE | Site: ARTERIAL | Status: FUNCTIONAL
Brand: GORE VIABAHN ENDOPROSTHESIS WITH HEPARIN

## 2023-07-29 DEVICE — DRUG-ELUTING VASCULAR STENT SYSTEM
Type: IMPLANTABLE DEVICE | Site: ARTERIAL | Status: FUNCTIONAL
Brand: ELUVIA™

## 2023-07-29 RX ORDER — MIDAZOLAM HYDROCHLORIDE 2 MG/2ML
INJECTION, SOLUTION INTRAMUSCULAR; INTRAVENOUS AS NEEDED
Status: DISCONTINUED | OUTPATIENT
Start: 2023-07-29 | End: 2023-07-29

## 2023-07-29 RX ORDER — ONDANSETRON 2 MG/ML
INJECTION INTRAMUSCULAR; INTRAVENOUS AS NEEDED
Status: DISCONTINUED | OUTPATIENT
Start: 2023-07-29 | End: 2023-07-29

## 2023-07-29 RX ORDER — HEPARIN SODIUM 10000 [USP'U]/100ML
3-20 INJECTION, SOLUTION INTRAVENOUS
Status: DISCONTINUED | OUTPATIENT
Start: 2023-07-29 | End: 2023-08-04

## 2023-07-29 RX ORDER — LABETALOL HYDROCHLORIDE 5 MG/ML
INJECTION, SOLUTION INTRAVENOUS AS NEEDED
Status: DISCONTINUED | OUTPATIENT
Start: 2023-07-29 | End: 2023-07-29

## 2023-07-29 RX ORDER — SODIUM CHLORIDE 9 MG/ML
INJECTION, SOLUTION INTRAVENOUS CONTINUOUS PRN
Status: DISCONTINUED | OUTPATIENT
Start: 2023-07-29 | End: 2023-07-29

## 2023-07-29 RX ORDER — HYDROMORPHONE HYDROCHLORIDE 1 MG/ML
INJECTION, SOLUTION INTRAMUSCULAR; INTRAVENOUS; SUBCUTANEOUS AS NEEDED
Status: DISCONTINUED | OUTPATIENT
Start: 2023-07-29 | End: 2023-07-29

## 2023-07-29 RX ORDER — MAGNESIUM HYDROXIDE 1200 MG/15ML
LIQUID ORAL AS NEEDED
Status: DISCONTINUED | OUTPATIENT
Start: 2023-07-29 | End: 2023-07-29 | Stop reason: HOSPADM

## 2023-07-29 RX ORDER — ROCURONIUM BROMIDE 10 MG/ML
INJECTION, SOLUTION INTRAVENOUS AS NEEDED
Status: DISCONTINUED | OUTPATIENT
Start: 2023-07-29 | End: 2023-07-29

## 2023-07-29 RX ORDER — ONDANSETRON 2 MG/ML
4 INJECTION INTRAMUSCULAR; INTRAVENOUS EVERY 4 HOURS PRN
Status: DISCONTINUED | OUTPATIENT
Start: 2023-07-29 | End: 2023-07-29 | Stop reason: HOSPADM

## 2023-07-29 RX ORDER — DEXAMETHASONE SODIUM PHOSPHATE 10 MG/ML
INJECTION, SOLUTION INTRAMUSCULAR; INTRAVENOUS AS NEEDED
Status: DISCONTINUED | OUTPATIENT
Start: 2023-07-29 | End: 2023-07-29

## 2023-07-29 RX ORDER — LABETALOL HYDROCHLORIDE 5 MG/ML
5 INJECTION, SOLUTION INTRAVENOUS
Status: DISCONTINUED | OUTPATIENT
Start: 2023-07-29 | End: 2023-08-05 | Stop reason: HOSPADM

## 2023-07-29 RX ORDER — CEFAZOLIN SODIUM 1 G/3ML
INJECTION, POWDER, FOR SOLUTION INTRAMUSCULAR; INTRAVENOUS AS NEEDED
Status: DISCONTINUED | OUTPATIENT
Start: 2023-07-29 | End: 2023-07-29

## 2023-07-29 RX ORDER — IODIXANOL 320 MG/ML
INJECTION, SOLUTION INTRAVASCULAR AS NEEDED
Status: DISCONTINUED | OUTPATIENT
Start: 2023-07-29 | End: 2023-07-29 | Stop reason: HOSPADM

## 2023-07-29 RX ORDER — SODIUM CHLORIDE, SODIUM LACTATE, POTASSIUM CHLORIDE, CALCIUM CHLORIDE 600; 310; 30; 20 MG/100ML; MG/100ML; MG/100ML; MG/100ML
INJECTION, SOLUTION INTRAVENOUS CONTINUOUS PRN
Status: DISCONTINUED | OUTPATIENT
Start: 2023-07-29 | End: 2023-07-29

## 2023-07-29 RX ORDER — FENTANYL CITRATE/PF 50 MCG/ML
25 SYRINGE (ML) INJECTION
Status: DISCONTINUED | OUTPATIENT
Start: 2023-07-29 | End: 2023-07-29 | Stop reason: HOSPADM

## 2023-07-29 RX ORDER — KETAMINE HYDROCHLORIDE 100 MG/ML
INJECTION INTRAMUSCULAR; INTRAVENOUS AS NEEDED
Status: DISCONTINUED | OUTPATIENT
Start: 2023-07-29 | End: 2023-07-29

## 2023-07-29 RX ORDER — SODIUM CHLORIDE, SODIUM LACTATE, POTASSIUM CHLORIDE, CALCIUM CHLORIDE 600; 310; 30; 20 MG/100ML; MG/100ML; MG/100ML; MG/100ML
100 INJECTION, SOLUTION INTRAVENOUS CONTINUOUS
Status: DISCONTINUED | OUTPATIENT
Start: 2023-07-29 | End: 2023-07-30

## 2023-07-29 RX ORDER — HYDROMORPHONE HCL/PF 1 MG/ML
0.5 SYRINGE (ML) INJECTION
Status: DISCONTINUED | OUTPATIENT
Start: 2023-07-29 | End: 2023-07-29 | Stop reason: HOSPADM

## 2023-07-29 RX ORDER — HEPARIN SODIUM 1000 [USP'U]/ML
2600 INJECTION, SOLUTION INTRAVENOUS; SUBCUTANEOUS EVERY 6 HOURS PRN
Status: DISCONTINUED | OUTPATIENT
Start: 2023-07-29 | End: 2023-07-29

## 2023-07-29 RX ORDER — SODIUM CHLORIDE, SODIUM LACTATE, POTASSIUM CHLORIDE, CALCIUM CHLORIDE 600; 310; 30; 20 MG/100ML; MG/100ML; MG/100ML; MG/100ML
125 INJECTION, SOLUTION INTRAVENOUS CONTINUOUS
Status: DISCONTINUED | OUTPATIENT
Start: 2023-07-29 | End: 2023-07-30

## 2023-07-29 RX ORDER — HEPARIN SODIUM 1000 [USP'U]/ML
5200 INJECTION, SOLUTION INTRAVENOUS; SUBCUTANEOUS EVERY 6 HOURS PRN
Status: DISCONTINUED | OUTPATIENT
Start: 2023-07-29 | End: 2023-07-29

## 2023-07-29 RX ORDER — PAPAVERINE HYDROCHLORIDE 30 MG/ML
INJECTION INTRAMUSCULAR; INTRAVENOUS AS NEEDED
Status: DISCONTINUED | OUTPATIENT
Start: 2023-07-29 | End: 2023-07-29 | Stop reason: HOSPADM

## 2023-07-29 RX ORDER — HEPARIN SODIUM 10000 [USP'U]/100ML
3-30 INJECTION, SOLUTION INTRAVENOUS
Status: DISCONTINUED | OUTPATIENT
Start: 2023-07-29 | End: 2023-07-29

## 2023-07-29 RX ORDER — PROPOFOL 10 MG/ML
INJECTION, EMULSION INTRAVENOUS AS NEEDED
Status: DISCONTINUED | OUTPATIENT
Start: 2023-07-29 | End: 2023-07-29

## 2023-07-29 RX ORDER — CLOPIDOGREL BISULFATE 75 MG/1
150 TABLET ORAL DAILY
Status: DISCONTINUED | OUTPATIENT
Start: 2023-07-29 | End: 2023-07-30

## 2023-07-29 RX ORDER — FENTANYL CITRATE 50 UG/ML
INJECTION, SOLUTION INTRAMUSCULAR; INTRAVENOUS AS NEEDED
Status: DISCONTINUED | OUTPATIENT
Start: 2023-07-29 | End: 2023-07-29

## 2023-07-29 RX ADMIN — Medication 5 MG: at 18:12

## 2023-07-29 RX ADMIN — PHENYLEPHRINE HYDROCHLORIDE 25 MCG/MIN: 10 INJECTION INTRAVENOUS at 15:04

## 2023-07-29 RX ADMIN — FENTANYL CITRATE 50 MCG: 50 INJECTION INTRAMUSCULAR; INTRAVENOUS at 14:57

## 2023-07-29 RX ADMIN — FENTANYL CITRATE 50 MCG: 50 INJECTION INTRAMUSCULAR; INTRAVENOUS at 15:33

## 2023-07-29 RX ADMIN — HEPARIN SODIUM 19 UNITS/KG/HR: 10000 INJECTION, SOLUTION INTRAVENOUS at 09:28

## 2023-07-29 RX ADMIN — HYDROMORPHONE HYDROCHLORIDE 0.5 MG: 1 INJECTION, SOLUTION INTRAMUSCULAR; INTRAVENOUS; SUBCUTANEOUS at 09:47

## 2023-07-29 RX ADMIN — AMLODIPINE BESYLATE 5 MG: 5 TABLET ORAL at 08:32

## 2023-07-29 RX ADMIN — SODIUM CHLORIDE, SODIUM LACTATE, POTASSIUM CHLORIDE, AND CALCIUM CHLORIDE: .6; .31; .03; .02 INJECTION, SOLUTION INTRAVENOUS at 14:51

## 2023-07-29 RX ADMIN — KETAMINE HYDROCHLORIDE 25 MG: 100 INJECTION INTRAMUSCULAR; INTRAVENOUS at 14:53

## 2023-07-29 RX ADMIN — Medication 2.5 MG: at 21:17

## 2023-07-29 RX ADMIN — IOHEXOL 100 ML: 350 INJECTION, SOLUTION INTRAVENOUS at 13:52

## 2023-07-29 RX ADMIN — HEPARIN SODIUM 18 UNITS/KG/HR: 10000 INJECTION, SOLUTION INTRAVENOUS at 10:27

## 2023-07-29 RX ADMIN — ONDANSETRON 4 MG: 2 INJECTION INTRAMUSCULAR; INTRAVENOUS at 16:52

## 2023-07-29 RX ADMIN — OXYCODONE HYDROCHLORIDE 5 MG: 5 TABLET ORAL at 08:37

## 2023-07-29 RX ADMIN — FENTANYL CITRATE 100 MCG: 50 INJECTION INTRAMUSCULAR; INTRAVENOUS at 16:08

## 2023-07-29 RX ADMIN — KETAMINE HYDROCHLORIDE 25 MG: 100 INJECTION INTRAMUSCULAR; INTRAVENOUS at 16:05

## 2023-07-29 RX ADMIN — NICOTINE 1 PATCH: 14 PATCH, EXTENDED RELEASE TRANSDERMAL at 08:32

## 2023-07-29 RX ADMIN — CEFAZOLIN 1000 MG: 1 INJECTION, POWDER, FOR SOLUTION INTRAMUSCULAR; INTRAVENOUS at 15:17

## 2023-07-29 RX ADMIN — HEPARIN SODIUM 2600 UNITS: 1000 INJECTION INTRAVENOUS; SUBCUTANEOUS at 11:37

## 2023-07-29 RX ADMIN — DEXAMETHASONE SODIUM PHOSPHATE 10 MG: 10 INJECTION, SOLUTION INTRAMUSCULAR; INTRAVENOUS at 16:35

## 2023-07-29 RX ADMIN — SUGAMMADEX 200 MG: 100 INJECTION, SOLUTION INTRAVENOUS at 17:23

## 2023-07-29 RX ADMIN — MIDAZOLAM 2 MG: 1 INJECTION INTRAMUSCULAR; INTRAVENOUS at 14:49

## 2023-07-29 RX ADMIN — ROCURONIUM BROMIDE 50 MG: 10 INJECTION, SOLUTION INTRAVENOUS at 14:57

## 2023-07-29 RX ADMIN — SODIUM CHLORIDE, SODIUM LACTATE, POTASSIUM CHLORIDE, AND CALCIUM CHLORIDE: .6; .31; .03; .02 INJECTION, SOLUTION INTRAVENOUS at 16:36

## 2023-07-29 RX ADMIN — CLOPIDOGREL BISULFATE 150 MG: 75 TABLET ORAL at 21:17

## 2023-07-29 RX ADMIN — LABETALOL HYDROCHLORIDE 5 MG: 5 INJECTION, SOLUTION INTRAVENOUS at 17:53

## 2023-07-29 RX ADMIN — HYDROMORPHONE HYDROCHLORIDE 1 MG: 1 INJECTION, SOLUTION INTRAMUSCULAR; INTRAVENOUS; SUBCUTANEOUS at 16:51

## 2023-07-29 RX ADMIN — SODIUM CHLORIDE: 0.9 INJECTION, SOLUTION INTRAVENOUS at 15:01

## 2023-07-29 RX ADMIN — ASPIRIN 81 MG CHEWABLE TABLET 81 MG: 81 TABLET CHEWABLE at 08:32

## 2023-07-29 RX ADMIN — PROPOFOL 200 MG: 10 INJECTION, EMULSION INTRAVENOUS at 14:57

## 2023-07-29 RX ADMIN — SODIUM CHLORIDE: 0.9 INJECTION, SOLUTION INTRAVENOUS at 16:55

## 2023-07-29 NOTE — CASE MANAGEMENT
Case Management Progress Note    Patient name Cece Coleman  Location [address]/[facility] 317-80 MRN [id number]  : 1970 Date 2023       LOS (days): 1  Geometric Mean LOS (GMLOS) (days):   Days to GMLOS:        OBJECTIVE:        Current admission status: Inpatient  Preferred Pharmacy:   [facility] [address] [facility] [address] [place] [address]  Phone: [phone] Fax: [phone]    Primary Care Provider: Parth Burk DO    Primary Insurance: [employer]  Secondary Insurance:     PROGRESS NOTE:    Consult received re: "Other". Per chart, patient transfered [facility] Sx intervention. No CM needs identified at this time, consult closed at this time. CM department available for any DCP needs.

## 2023-07-29 NOTE — CONSULTS
Consult Note - Vascular Surgery   Aaron Christensen 46 y.o. male MRN: [id number]    Unit/Bed#: [facility] 503-01 Encounter: [id number]    Assessment:  46 year male with PMH Hypertension, hyperlipidemia, current smoker who presents with an 11 day history of RLE pain, difficulty bearing weight and reported numbness. Imaging remarkable for occlusion of mid and distal SFA, distal AT artery. DP/PT signals present. No motor or sensory loss. Plan:  NPO after MN for possible procedure 7/29/2023. Frequent NV checks. Pain control. Continue hep drip on VTE protocol. Pain control. Remainder of care per primary team.    Consulting Service: Vascular surgery    Chief Complaint: RLE pain since 7/11/23    HPI: Aaron Christensen is a 46 y.o. male who presents with RLE pain and reported numbness. The patient reports that he had a cigarette at work on 7/11/2023 and immediately felt pain in his right lower calf. He went to [facility] where a venous duplex was performed which was negative. He continued to have pain which is worse with bearing weight, causing him to now use crutches. He saw his PCP who ordered a LEADS. This was remarkable for an SFA occlusion and he was sent to the ED for further evaluation. He has been a smoker for many years. He has a past medical history of hypertension, lung nodule, and hyperlipidemia.     Review of Systems:  General: negative for - chills, fatigue or fever  Cardiovascular: no chest pain or dyspnea on exertion  Respiratory: no cough, shortness of breath, or wheezing  Gastrointestinal: no abdominal pain, change in bowel habits, or black or bloody stools  Genitourinary ROS: no dysuria, trouble voiding, or hematuria  Musculoskeletal ROS: positive for - muscle pain  Neurological ROS: no TIA or stroke symptoms  Hematological and Lymphatic ROS: negative  Dermatological ROS: negative  Psychological ROS: negative  Ophthalmic ROS: negative  ENT ROS: Negative    Past Medical History:  Past Medical History:   Diagnosis Date   • Hypertension    • Lung nodule    • Prediabetes        Past Surgical History:  No past surgical history on file. Social History:  Social History     Substance and Sexual Activity   Alcohol Use Yes   • Alcohol/week: 14.0 standard drinks of alcohol   • Types: 14 Cans of beer per week     Social History     Substance and Sexual Activity   Drug Use No     Social History     Tobacco Use   Smoking Status Every Day   • Packs/day: 0.50   • Types: Cigarettes   • Start date: 1988   • Passive exposure: Never   Smokeless Tobacco Never       Family History:  No family history on file. Allergies:  No Known Allergies    Medications:  Current Facility-Administered Medications   Medication Dose Route Frequency   • [START ON 7/29/2023] amLODIPine (NORVASC) tablet 5 mg  5 mg Oral Daily   • [START ON 7/29/2023] aspirin chewable tablet 81 mg  81 mg Oral Daily   • heparin (porcine) 25,000 units in 0.45% NaCl 250 mL infusion (premix)  3-30 Units/kg/hr (Order-Specific) Intravenous Titrated   • hydrALAZINE (APRESOLINE) injection 10 mg  10 mg Intravenous Q6H PRN   • HYDROmorphone (DILAUDID) injection 0.5 mg  0.5 mg Intravenous Q3H PRN   • [START ON 7/29/2023] nicotine (NICODERM CQ) 14 mg/24hr TD 24 hr patch 1 patch  1 patch Transdermal Daily   • oxyCODONE (ROXICODONE) split tablet 2.5 mg  2.5 mg Oral Q4H PRN    Or   • oxyCODONE (ROXICODONE) IR tablet 5 mg  5 mg Oral Q4H PRN       Vitals:  BP (!) 178/92   Pulse 71   Temp 98.2 °F (36.8 °C) (Oral)   Resp 19   Ht 5' 9" (1.753 m)   Wt 70.8 kg (156 lb)   SpO2 96%   BMI 23.04 kg/m²     I/Os:  No intake/output data recorded.     Lab Results and Cultures:   Lab Results   Component Value Date    WBC 12.68 (H) 07/28/2023    HGB 16.4 07/28/2023    HCT 48.0 07/28/2023    MCV 81 (L) 07/28/2023     (H) 07/28/2023     Lab Results   Component Value Date    CALCIUM 10.4 (H) 07/28/2023    K 4.0 07/28/2023    CO2 28 07/28/2023     07/28/2023    BUN 15 07/28/2023    CREATININE 0.77 07/28/2023     Lab Results   Component Value Date    INR 1.08 07/28/2023    INR 1.08 10/01/2018    PROTIME 13.8 07/28/2023    PROTIME 13.9 10/01/2018         Imaging:  LEADS 7/28/23  CONCLUSION:  Impression:  RIGHT LOWER LIMB:  Findings suggestive of an acute occlusion of the mid and distal superficial  femoral artery. There is an occlusion of the distal anterior tibial artery. Ankle/Brachial index: 0.17, which is in the rest pain/tissure loss category. Metatarsal pressure of 54mmHg  Great toe pressure of 37mmHg, within the healing range. PVR/ PPG tracings are attenuated. LEFT LOWER LIMB:  Ankle/Brachial index: 1.07, which is in the normal category.:  Metatarsal pressure of 188mmHg  Great toe pressure of 160mmHg, within the healing range. PVR/ PPG tracings are normal.    Physical Exam:    General appearance: alert and oriented, in no acute distress  Skin: Skin color, texture, turgor normal. No rashes or lesions  Neurologic: Grossly normal  Head: Normocephalic, without obvious abnormality, atraumatic  Eyes: EOMI  Lungs: No resp distress  Heart: No edema  Abdomen: soft  Extremities: cool to touch. Motor and sensory intact.   +dorsiflexion/plantar flexion      Pulse exam:  Femoral: Right: 2+ Left: 2+  Popliteal: Right: absent   DP: Right: doppler signal Left: 1+  PT: Right: doppler signal Left: doppler signal      Bibi Landrum PA-C  7/28/2023

## 2023-07-29 NOTE — PROGRESS NOTES
[address] [facility]  Progress Note  Name: Hima Carballo  MRN: [id number]  Unit/Bed#: OR POOL I Date of Admission: 7/28/2023   Date of Service: 7/29/2023 I Hospital Day: 1    Assessment/Plan   * Arterial occlusion  Assessment & Plan  Patient with 2 weeks of right lower extremity pain consistent with claudication  Lower extremity Dopplers notable for F an acute occlusion of the mid and distal superficial  femoral artery. There is an occlusion of the distal anterior tibial artery  She has been transferred to [facility] for vascular evaluation   continue on heparin drip, aspirin  Vascular surgery evaluation appreciated. Patient had surgical intervention. Currently kept n.p.o. and waiting for surgical intervention    Hyperlipidemia, mixed  Assessment & Plan  Patient noted to have elevated transaminitis on lab work last week-will recheck cmp before starting statin    Essential hypertension  Assessment & Plan  Monitor bp while inpatient  Continue amlodipine 5mg daily    Tobacco dependence  Assessment & Plan  Lifestyle changes dsicussed             VTE Pharmacologic Prophylaxis:   Heparin drip  Patient Centered Rounds: I performed bedside rounds with nursing staff today. Discussions with Specialists or Other Care Team Provider:     Education and Discussions with Family / Patient: Updated patient. Total Time Spent on Date of Encounter in care of patient: 35 minutes This time was spent on one or more of the following: performing physical exam; counseling and coordination of care; obtaining or reviewing history; documenting in the medical record; reviewing/ordering tests, medications or procedures; communicating with other healthcare professionals and discussing with patient's family/caregivers.     Current Length of Stay: 1 day(s)  Current Patient Status: Inpatient   Certification Statement: The patient will continue to require additional inpatient hospital stay due to Pending surgical intervention  Discharge Plan:     Code Status: Level 1 - Full Code    Subjective:   Patient seen and examined. Pain is currently controlled. Seen for surgical intervention. Patient wanted to know when he is going for the surgery. Patient is kept n.p.o. Objective:     Vitals:   Temp (24hrs), Av.9 °F (36.6 °C), Min:97.4 °F (36.3 °C), Max:98.2 °F (36.8 °C)    Temp:  [97.4 °F (36.3 °C)-98.2 °F (36.8 °C)] 97.4 °F (36.3 °C)  HR:  [68-80] 70  Resp:  [14-19] 14  BP: (146-215)/() 215/89  SpO2:  [96 %-100 %] 96 %  Body mass index is 22.56 kg/m². Input and Output Summary (last 24 hours): Intake/Output Summary (Last 24 hours) at 2023 1758  Last data filed at 2023 1757  Gross per 24 hour   Intake 2616 ml   Output 900 ml   Net 1716 ml       Physical Exam:   Physical Exam  Constitutional:       General: He is not in acute distress. Appearance: He is not ill-appearing. HENT:      Head: Normocephalic. Nose: Nose normal.   Eyes:      General: No scleral icterus. Cardiovascular:      Rate and Rhythm: Normal rate and regular rhythm. Heart sounds: Normal heart sounds. Pulmonary:      Breath sounds: Normal breath sounds. Abdominal:      General: There is no distension. Tenderness: There is no abdominal tenderness. Musculoskeletal:         General: No swelling. Cervical back: Normal range of motion. Comments: Right lower extremity cool to touch. No pedal pulses palpable     Neurological:      Mental Status: He is alert.         Additional Data:     Labs:  Results from last 7 days   Lab Units 23  1042 23  0649   WBC Thousand/uL 11.34* 11.65*   HEMOGLOBIN g/dL 16.4 14.9   HEMATOCRIT % 47.0 45.2   PLATELETS Thousands/uL 556* 553*   NEUTROS PCT %  --  56   LYMPHS PCT %  --  31   MONOS PCT %  --  9   EOS PCT %  --  3     Results from last 7 days   Lab Units 23  0649 23  0005   SODIUM mmol/L 136 134*   POTASSIUM mmol/L 4.0 4.2   CHLORIDE mmol/L 106 103   CO2 mmol/L 28 27   BUN mg/dL 18 15   CREATININE mg/dL 0.77 0.81   ANION GAP mmol/L 2 4   CALCIUM mg/dL 9.1 9.4   ALBUMIN g/dL  --  3.5   TOTAL BILIRUBIN mg/dL  --  0.81   ALK PHOS U/L  --  94   ALT U/L  --  83*   AST U/L  --  69*   GLUCOSE RANDOM mg/dL 106 105     Results from last 7 days   Lab Units 07/29/23  1042   INR  1.03                   Lines/Drains:  Invasive Devices     Peripheral Intravenous Line  Duration           Peripheral IV 07/28/23 Dorsal (posterior); Right Hand 1 day    Peripheral IV 07/28/23 Right Antecubital 1 day    Peripheral IV 07/29/23 Left Arm <1 day          Drain  Duration           Urethral Catheter Latex 16 Fr. <1 day              Urinary Catheter:  Goal for removal: Plan to remove POD#1           Recent Cultures (last 7 days):         Last 24 Hours Medication List:   Current Facility-Administered Medications   Medication Dose Route Frequency Provider Last Rate   • [MAR Hold] amLODIPine  5 mg Oral Daily Stevie Banai, DO     • [MAR Hold] aspirin  81 mg Oral Daily Stevie Banai, DO     • heparin (porcine)  3-30 Units/kg/hr (Order-Specific) Intravenous Titrated Connor Jaimes MD 20 Units/kg/hr (07/29/23 1740)   • [MAR Hold] heparin (porcine)  2,600 Units Intravenous Q6H PRN Connor Jaimes MD     • [facility] Hold] heparin (porcine)  5,200 Units Intravenous Q6H PRN Connor Jaimes MD     • [facility] Hold] hydrALAZINE  10 mg Intravenous Q6H PRN Stevie Banai, DO     • [MAR Hold] HYDROmorphone  0.5 mg Intravenous Q3H PRN Stevie Banai, DO     • [MAR Hold] nicotine  1 patch Transdermal Daily Stevie Banai, DO     • [MAR Hold] oxyCODONE  2.5 mg Oral Q4H PRN Stevie Banai, DO      Or   • [MAR Hold] oxyCODONE  5 mg Oral Q4H PRN Stevie Banai, DO       Facility-Administered Medications Ordered in Other Encounters   Medication Dose Route Frequency Provider Last Rate   • ceFAZolin   Intravenous PRN Humaira Howell CRNA     • dexamethasone (PF)   Intravenous PRN Humaira Howell CRNA     • fentanyl citrate (PF)   Intravenous PRN Graceann Alto, CRNA     • HYDROmorphone (PF)   Intravenous PRN Graceann Alto, CRNA     • ketamine   Intravenous PRN Graceann Alto, CRNA     • lactated ringers   Intravenous Continuous PRN Graceann Alto, CRNA     • midazolam   Intravenous PRN Graceann Alto, CRNA     • ondansetron   Intravenous PRN Graceann Alto, CRNA     • phenylephrine (RK-SYNEPHRINE) 50 mg (STANDARD CONCENTRATION) in sodium chloride 0.9% 250 mL   Intravenous Continuous PRN Graceann Alto, CRNA Stopped (07/29/23 1712)   • phenylephrine   Intravenous PRN Graceann Alto, CRNA     • propofol   Intravenous PRN Graceann Alto, CRNA     • ROCuronium   Intravenous PRN Graceann Alto, CRNA     • sodium chloride   Intravenous Continuous PRN Graceann Alto, CRNA Stopped (07/29/23 1751)   • Sugammadex Sodium   Intravenous PRN Tyson Alto, CRNA          Today, Patient Was Seen By: Nguyen Fang MD    **Please Note: This note may have been constructed using a voice recognition system. **

## 2023-07-29 NOTE — ASSESSMENT & PLAN NOTE
Patient with 2 weeks of right lower extremity pain consistent with claudication  Lower extremity Dopplers notable for F an acute occlusion of the mid and distal superficial  femoral artery. There is an occlusion of the distal anterior tibial artery  She has been transferred to [facility] for vascular evaluation   continue on heparin drip, aspirin, npo at midnight  Hold statin pending CMP for assessment of liver enzymes

## 2023-07-29 NOTE — ASSESSMENT & PLAN NOTE
Patient noted to have elevated transaminitis on lab work last week-will recheck cmp before starting statin

## 2023-07-29 NOTE — ANESTHESIA POSTPROCEDURE EVALUATION
Post-Op Assessment Note    CV Status:  Stable    Pain management: adequate     Mental Status:  Sleepy and somnolent   Hydration Status:  Stable   PONV Controlled:  None   Airway Patency:  Patent      Post Op Vitals Reviewed: Yes      Staff: Anesthesiologist, CRNA   Comments: bp 215/85, 5 mg labetolol with improvement in BP        No notable events documented.     /94 (07/29/23 1800)    Temp     Pulse 66 (07/29/23 1800)   Resp 16 (07/29/23 1800)    SpO2 95 % (07/29/23 1800)

## 2023-07-29 NOTE — ANESTHESIA PREPROCEDURE EVALUATION
Procedure:  EMBOLECTOMY/THROMBECTOMY LOWER EXTREMITY (Right: Leg Lower)    Relevant Problems   CARDIO   (+) Essential hypertension   (+) Hyperlipidemia, mixed      Smoker    Cr 0.77, hgb 16.4, plt 556           Anesthesia Plan  ASA Score- 3 Emergent    Anesthesia Type- general with ASA Monitors. Additional Monitors: arterial line. Airway Plan: ETT. Comment: General anesthesia, endotracheal tube; standard ASA monitors. Risks and benefits discussed with patient; patient consented and agrees to proceed. I saw and evaluated the patient. If seen with CRNA, we have discussed the anesthetic plan and I am in agreement that the plan is appropriate for the patient. .       Plan Factors-    Chart reviewed. Existing labs reviewed. Induction- intravenous. Postoperative Plan- Plan for postoperative opioid use. Planned trial extubation    Informed Consent- Anesthetic plan and risks discussed with patient. I personally reviewed this patient with the CRNA. Discussed and agreed on the Anesthesia Plan with the CRNA. Tesfaye Au

## 2023-07-29 NOTE — ASSESSMENT & PLAN NOTE
Patient with 2 weeks of right lower extremity pain consistent with claudication  Lower extremity Dopplers notable for F an acute occlusion of the mid and distal superficial  femoral artery. There is an occlusion of the distal anterior tibial artery  She has been transferred to [facility] for vascular evaluation   continue on heparin drip, aspirin  Vascular surgery evaluation appreciated. Patient had surgical intervention.   Currently kept n.p.o. and waiting for surgical intervention

## 2023-07-29 NOTE — CONSULTS
e-Consult (IPC)  - Interventional Radiology  Brian Balbuena 46 y.o. male MRN: [id number]  Unit/Bed#: [facility] 503-01 Encounter: [id number]          Interventional Radiology has been consulted to evaluate [address] [place] [address] to IR  Consult performed by: Saurav Rosario MD  Consult ordered by: Bibi Landrum PA-C        07/29/23    Assessment/Recommendation:   46year old male with history of HTN, HLD, smoking presenting with RLE pain and numbness starting on 7/11/2023. Patient was initially evaluated at [facility] with RLE venous duplex which was negative. Arterial duplex performed yesterday showed acute occlusion of mid and distal right SFA as well as distal right AT. RLE CRISTEL was 0.17.    - Plan for RLE Agram with possible intervention in the upcoming week pending IR schedule availability  - Please keep patient NPO after midnight on Sunday night for possible procedure on Monday. 5-10 minutes, >50% of the total time devoted to medical consultative verbal/EMR discussion between providers. Written report will be generated in the EMR. Thank you for allowing Interventional Radiology to participate in the care of Brian Balbuena. Please don't hesitate to call or TigerText us with any questions.      Saurav Rosario MD

## 2023-07-29 NOTE — H&P
[address] [place] [address]  H&P  Name: Tari Kiran 46 y.o. male I MRN: [id number]  [facility] I Date of Admission: 7/28/2023   Date of Service: 7/28/2023 I Hospital Day: 0      Assessment/Plan   * Arterial occlusion  Assessment & Plan  Patient with 2 weeks of right lower extremity pain consistent with claudication  Lower extremity Dopplers notable for F an acute occlusion of the mid and distal superficial  femoral artery. There is an occlusion of the distal anterior tibial artery  She has been transferred to [facility] for vascular evaluation   continue on heparin drip, aspirin, npo at midnight  Hold statin pending CMP for assessment of liver enzymes    Hyperlipidemia, mixed  Assessment & Plan  Patient noted to have elevated transaminitis on lab work last week-will recheck cmp before starting statin    Essential hypertension  Assessment & Plan  Monitor bp while inpatient  Continue amlodipine 5mg daily    Tobacco dependence  Assessment & Plan  Lifestyle changes dsicussed             VTE Pharmacologic Prophylaxis:   High Risk (Score >/= 5) - Pharmacological DVT Prophylaxis Ordered: heparin drip. Sequential Compression Devices Ordered. Code Status: Level 1 - Full Code   Discussion with family: Patient declined call to . Anticipated Length of Stay: Patient will be admitted on an inpatient basis with an anticipated length of stay of greater than 2 midnights secondary to rle arterial occlusion. Total Time Spent on Date of Encounter in care of patient: 40 minutes This time was spent on one or more of the following: performing physical exam; counseling and coordination of care; obtaining or reviewing history; documenting in the medical record; reviewing/ordering tests, medications or procedures; communicating with other healthcare professionals and discussing with patient's family/caregivers.     Chief Complaint:     History of Present Illness:  Tari Kiran is a 46 y.o. male with a PMH of tobacco abuse, hypertension, hyperlipidemia, who presents with right lower extremity pain. Patient reports several weeks of right lower extremity pain with ambulation consistent with claudication. Patient underwent Dopplers of lower extremity which were notable for acute arterial occlusion in the mid and distal superficial femoral artery as well as distal anterior tibial artery. Patient was recommended for transfer to vascular for further evaluation. Review of Systems:  Review of Systems   Constitutional: Negative for chills, fatigue and fever. HENT: Negative for ear pain and sore throat. Eyes: Negative for pain and visual disturbance. Respiratory: Negative for apnea, cough, shortness of breath and wheezing. Cardiovascular: Negative for chest pain, palpitations and leg swelling. Gastrointestinal: Negative for abdominal distention, abdominal pain, diarrhea, nausea and vomiting. Genitourinary: Negative for dysuria and hematuria. Musculoskeletal: Positive for myalgias. Negative for arthralgias and back pain. Skin: Negative for color change, pallor and rash. Neurological: Negative for seizures and syncope. All other systems reviewed and are negative. Past Medical and Surgical History:   Past Medical History:   Diagnosis Date   • Hypertension    • Lung nodule    • Prediabetes        No past surgical history on file. Meds/Allergies:  Prior to Admission medications    Medication Sig Start Date End Date Taking?  Authorizing Provider   methocarbamol (ROBAXIN) 500 mg tablet Take 1 tablet (500 mg total) by mouth 4 (four) times a day 7/19/23  Yes Beatriz Schultz DO   amLODIPine (NORVASC) 5 mg tablet Take 1 tablet (5 mg total) by mouth daily for 90 days 6/19/19 7/19/23  NATALYA Roberts   aspirin (ECOTRIN LOW STRENGTH) 81 mg EC tablet Take 1 tablet (81 mg total) by mouth daily  Patient not taking: Reported on 7/28/2023 7/19/23   Beatriz Schultz DO predniSONE 50 mg tablet take 1 tablet IN THE MORNING for 7 days  Patient not taking: Reported on 7/28/2023 7/17/23   Historical Provider, MD LEPE have reviewed home medications with patient personally. Allergies: No Known Allergies    Social History:  Marital Status: /Civil Union   Occupation:   Patient Pre-hospital Living Situation: Home  Patient Pre-hospital Level of Mobility: walks  Patient Pre-hospital Diet Restrictions:   Substance Use History:   Social History     Substance and Sexual Activity   Alcohol Use Yes   • Alcohol/week: 14.0 standard drinks of alcohol   • Types: 14 Cans of beer per week     Social History     Tobacco Use   Smoking Status Every Day   • Packs/day: 0.50   • Types: Cigarettes   • Start date: 1988   • Passive exposure: Never   Smokeless Tobacco Never     Social History     Substance and Sexual Activity   Drug Use No       Family History:  No family history on file. Physical Exam:     Vitals:   Blood Pressure: (!) 178/92 (07/28/23 2314)  Pulse: 71 (07/28/23 2314)  Temperature: 98.2 °F (36.8 °C) (07/28/23 2314)  Temp Source: Oral (07/28/23 2314)  Respirations: 19 (07/28/23 2314)  Height: 5' 9" (175.3 cm) (07/28/23 2314)  Weight - Scale: 70.8 kg (156 lb) (07/28/23 2314)  SpO2: 96 % (07/28/23 2314)    Physical Exam  Vitals and nursing note reviewed. Constitutional:       General: He is not in acute distress. Appearance: He is well-developed. He is not toxic-appearing or diaphoretic. HENT:      Head: Normocephalic and atraumatic. Eyes:      General: No scleral icterus. Conjunctiva/sclera: Conjunctivae normal.   Cardiovascular:      Rate and Rhythm: Normal rate and regular rhythm. Heart sounds: No murmur heard. No friction rub. No gallop. Pulmonary:      Effort: Pulmonary effort is normal. No respiratory distress. Breath sounds: Normal breath sounds. No stridor. No wheezing, rhonchi or rales. Chest:      Chest wall: No tenderness.    Abdominal: General: There is no distension. Palpations: Abdomen is soft. Tenderness: There is no abdominal tenderness. There is no guarding or rebound. Hernia: No hernia is present. Musculoskeletal:         General: No swelling. Cervical back: Neck supple. Comments: Cool lower extremities  Le wounds   Skin:     General: Skin is warm and dry. Capillary Refill: Capillary refill takes less than 2 seconds. Neurological:      Mental Status: He is alert and oriented to person, place, and time. Psychiatric:         Mood and Affect: Mood normal.          Additional Data:     Lab Results:  Results from last 7 days   Lab Units 07/28/23  1601   WBC Thousand/uL 12.68*   HEMOGLOBIN g/dL 16.4   HEMATOCRIT % 48.0   PLATELETS Thousands/uL 595*     Results from last 7 days   Lab Units 07/28/23  1601   SODIUM mmol/L 134*   POTASSIUM mmol/L 4.0   CHLORIDE mmol/L 100   CO2 mmol/L 28   BUN mg/dL 15   CREATININE mg/dL 0.77   ANION GAP mmol/L 6   CALCIUM mg/dL 10.4*   GLUCOSE RANDOM mg/dL 100     Results from last 7 days   Lab Units 07/28/23  1601   INR  1.08                   Lines/Drains:  Invasive Devices     Peripheral Intravenous Line  Duration           Peripheral IV 07/28/23 Dorsal (posterior); Right Hand <1 day    Peripheral IV 07/28/23 Right Antecubital <1 day                    Imaging: Reviewed radiology reports from this admission including: ultrasound(s)  No orders to display       EKG and Other Studies Reviewed on Admission:   · EKG: No EKG obtained. ** Please Note: This note has been constructed using a voice recognition system.  **

## 2023-07-29 NOTE — OP NOTE
OPERATIVE REPORT  PATIENT NAME: Deneen Sheikh    :  1970  MRN: [id number]  Pt Location: [facility] OR ROOM 02    SURGERY DATE: 2023    Surgeon(s) and Role:     * Morris Sen MD - Primary     * Vista Gitelman, MD - Assisting     * Thais Kaufman, DO - Assisting    Preop Diagnosis:  Pain in right leg [M79.604]  Arterial occlusion [I70.90]    Post-Op Diagnosis Codes:     * Pain in right leg [M79.604]     * Arterial occlusion [I70.90]    Procedure(s):  Right - RIGHT SFA CUTDOWN, RIGHT LEG ANGIOGRAM, RIGHT LEG BALLOON ANGIOPLASTY / STENT. RIGHT LEG FASCIOTOMY    Specimen(s):  * No specimens in log *    Estimated Blood Loss:   Minimal    Drains:  Urethral Catheter Latex 16 Fr. (Active)   Number of days: 0       Anesthesia Type:   General    Operative Indications:  Pain in right leg [M79.604]  Arterial occlusion []  29-year-old male with past medical history of hypertension, hyperlipidemia and smoking who presents with 11-day history of right leg pain and difficulty bearing weight. Patient started having sudden onset of right leg pain and inability to walk about 11 days ago. He first went to [facility] where he was evaluated with a Doppler and everything was negative so he was sent home after Toradol shot. He continued to have difficulty with walking and started using crutches. He then went to see his family physician who ordered a Doppler which showed occlusion of the artery and was sent over to the emergency room. Patient complains of swelling in his right outer calf and some mild numbness in the foot. It is also very painful to move his foot. On exam the right anterior and lateral compartment of the leg are significantly tender and swollen, per patient this has been ongoing for the last few days, could be element of compartment syndrome versus ischemia reperfusion related edema of the anterior compartment muscles. He has been on a heparin drip since admission.   We will obtain CT angiogram which shows segmental occlusion of right SFA. Patient will require right anterolateral fasciotomy and revascularization with possible thrombectomy of the SFA. On exam today there is biphasic anterior tibial and posterior tibial signals. Right foot cap refill less than 3 seconds. Operative Findings:  Chronic thrombus in distal SFA, about 2 cm occlusion. There is calcified plaque in that region, so could be acute on chronic process. After 6x50 Viabhan and 6x40 mm Shagufta, there is complete resolution of the occlusion, remainder of the SFA, Popliteal trifucation are patent with 3 vessel runoff. Bulging of anterior compartment muscles, healthy and contractile    Complications:   None    Procedure and Technique:  Pt was brought to Hybrid room with continuous heparin drip, general anesthesia was induced, jimenes catheter placed. Entire right leg was circumferentially prepped. Full time out was performed. Right SFA cutdown in upper thigh was made. After retracting the sartorius muscle we exposed the SFA and encircled it proximal and distally with vessel loops. We punctured the SFA antegrade with micropuncture and microwire and placed micro sheath, then over bentson wire we placed a 6Fr sheath in to SFA. Left leg runoff was performed which showed focal occlusion of distal SFA with subacute thrombotic lesion in prior old plaque. There is a 3 vessel runoff. So using V18 wire the lesion was crossed in true lumen. The vessel loop was used to occlude the SFA to prevent any antegrade flow so that we do not risk embolization during lesion treatment. A 6x50mm viabahn was placed across lesion and deployed under roadmap. Then it was post dilated with a 5x40mm balloon. There was some residual thrombus at the distal edge of stent. Hence a 6x40mm Shagufta stent was placed across this distal edge of stent with adequate overlap in to the Viabahn.   This stent was not dilated with balloon to avoid risk of embolization of any thrombotic material.  Completion angiogram was satisfactory with complete treatment of lesion and 3-vessel runoff. The sheath and wire were removed and arteriotomy was closed with a 6-0 prolene U-stitch after flushing the artery. We started with a fasciotomy of the anterior and lateral compartments. A generous incision was made between the tibia and the fibula using 15 blade. Then the fascia overlying the anterior compartment was divided along the leg sharply. There was diffuse bulging of the muscles of the anterior compartment. There were contractile and pink. They were not grossly necrotic. We then raised subcutaneous flaps and expose the lateral compartment. An incision was made over the lateral compartment using Bovie electrocautery and these muscles were also bulging. And then the fasciotomy incision was extended proximally and distally to completely release the lateral compartment as well. Care was taken to avoid the area of the peroneal nerve proximally. 2-0 Nylon sutures vertical mattress skin sutures were taken but left untied along the fasciotomy incision. I was present for the entire procedure. Patient Disposition:  PACU  and patient will return to OR for planned surgery as a staged procedure for fasciotomy closure at a later date. SIGNATURE: Lizbet Robles MD  DATE: July 29, 2023  TIME: 5:05 PM            Vascular Quality Initiative - Peripheral Vascular Intervention     Urgency: Emergent    Functional Status:  Fully active; able to carry on all predisease activities without restriction.    Ambulation: Amb = independently ambulatory    Leg Symptoms      RIGHT: Acute Ischemia:  Acute limb ischemia is defined as a sudden decrease in limb perfusion that causes a potential threat to limb viability (manifested by ischemic rest pain, ischemic ulcers, and/or gangrene) in patients who present within two weeks of the acute event  Immediately Threatened limbs: are salvageable with immediate revascularization. Sensory loss involves more than the toes and may be associated with rest pain. There is mild to moderate muscle weakness, arterial Doppler signals are usually inaudible, and venous Doppler signals are audible    LEFT : None    COVID Information  COVID Symptoms Pre-Procedure: Asymptomatic    Treatment Delayed by Pandemic: None    Access   Number of Sites: 1     Access Site 1:     Side 1: Right    Site 1: SFA    Access Guidance 1:Open Exposure    Largest Sheath Size 1: 6 Fr. Closure Device 1: None   None    Procedure  Fluoro Time: 11.4 minutes  Contrast Volume: Visipaque 90 ml  DAP: 8.46 Gy.cm2  CO2: no  Anticoagulant: Heparin  Protamine: No  If Creatinine is > 1.2 or missing, BOB Prophylaxis none     Treatment Details  Indication: Occlusive Disease,    Completion Assessment  Artery 1 treated: SFA        Right               Outflow: AT,PT,Peroneal: 3                  Was this Site previously treated?: No          TASC Grade: B          Total Treated Length: 6 cm          Total Occluded Length: 3 cm          Calcification: Moderate (calcification on both sides of artery < half length of lesion)          Number of Treatment types (Devices):    2           Device 1          Treatment Type: Stent Graft                Diameter: 6 mm          Length: 50 mm         Device 2          Treatment Type: Stent,  Drug Eluting Stent                Diameter: 6 mm          Length: 40 mm      Device 3          Treatment Type: Plain balloon            Concomitant: None          Technical result: Successful (stenosis <=30%)      None     Post Procedure  Patient currently taking: Statin, Yes      Antiplatelet Medication, Yes    Procedure Complications: No

## 2023-07-30 ENCOUNTER — APPOINTMENT (INPATIENT)
Dept: NON INVASIVE DIAGNOSTICS | Facility: HOSPITAL | Age: 53
DRG: 253 | End: 2023-07-30
Payer: COMMERCIAL

## 2023-07-30 LAB
ANION GAP SERPL CALCULATED.3IONS-SCNC: 8 MMOL/L
AORTIC ROOT: 2.9 CM
APTT PPP: 46 SECONDS (ref 23–37)
APTT PPP: 59 SECONDS (ref 23–37)
APTT PPP: 62 SECONDS (ref 23–37)
ASCENDING AORTA: 2.7 CM
AV LVOT PEAK GRADIENT: 6 MMHG
AV PEAK GRADIENT: 11 MMHG
BUN SERPL-MCNC: 12 MG/DL (ref 5–25)
CALCIUM SERPL-MCNC: 8.5 MG/DL (ref 8.3–10.1)
CHLORIDE SERPL-SCNC: 105 MMOL/L (ref 96–108)
CO2 SERPL-SCNC: 23 MMOL/L (ref 21–32)
CREAT SERPL-MCNC: 0.78 MG/DL (ref 0.6–1.3)
E WAVE DECELERATION TIME: 151 MS
ERYTHROCYTE [DISTWIDTH] IN BLOOD BY AUTOMATED COUNT: 13.3 % (ref 11.6–15.1)
FRACTIONAL SHORTENING: 37 % (ref 28–44)
GFR SERPL CREATININE-BSD FRML MDRD: 102 ML/MIN/1.73SQ M
GLUCOSE SERPL-MCNC: 163 MG/DL (ref 65–140)
HCT VFR BLD AUTO: 41.7 % (ref 36.5–49.3)
HGB BLD-MCNC: 14.1 G/DL (ref 12–17)
INTERVENTRICULAR SEPTUM IN DIASTOLE (PARASTERNAL SHORT AXIS VIEW): 1.1 CM
INTERVENTRICULAR SEPTUM: 1.1 CM (ref 0.6–1.1)
LAAS-AP2: 9.6 CM2
LAAS-AP4: 11.2 CM2
LEFT ATRIUM SIZE: 3 CM
LEFT ATRIUM VOLUME (MOD BIPLANE): 23 ML
LEFT INTERNAL DIMENSION IN SYSTOLE: 2.6 CM (ref 2.1–4)
LEFT VENTRICULAR INTERNAL DIMENSION IN DIASTOLE: 4.1 CM (ref 3.5–6)
LEFT VENTRICULAR POSTERIOR WALL IN END DIASTOLE: 1 CM
LEFT VENTRICULAR STROKE VOLUME: 51 ML
LVSV (TEICH): 51 ML
MCH RBC QN AUTO: 28 PG (ref 26.8–34.3)
MCHC RBC AUTO-ENTMCNC: 33.8 G/DL (ref 31.4–37.4)
MCV RBC AUTO: 83 FL (ref 82–98)
MV "A" WAVE DURATION: 107 MS
MV E'TISSUE VEL-LAT: 7 CM/S
MV E'TISSUE VEL-SEP: 10 CM/S
MV PEAK A VEL: 0.81 M/S
MV PEAK E VEL: 62 CM/S
MV STENOSIS PRESSURE HALF TIME: 44 MS
MV VALVE AREA P 1/2 METHOD: 5 CM2
PLATELET # BLD AUTO: 531 THOUSANDS/UL (ref 149–390)
PMV BLD AUTO: 8.3 FL (ref 8.9–12.7)
POTASSIUM SERPL-SCNC: 3.9 MMOL/L (ref 3.5–5.3)
RBC # BLD AUTO: 5.03 MILLION/UL (ref 3.88–5.62)
RIGHT ATRIUM AREA SYSTOLE A4C: 7.7 CM2
RIGHT VENTRICLE ID DIMENSION: 2.5 CM
SL CV LEFT ATRIUM LENGTH A2C: 3.8 CM
SL CV LV EF: 65
SL CV PED ECHO LEFT VENTRICLE DIASTOLIC VOLUME (MOD BIPLANE) 2D: 76 ML
SL CV PED ECHO LEFT VENTRICLE SYSTOLIC VOLUME (MOD BIPLANE) 2D: 25 ML
SODIUM SERPL-SCNC: 136 MMOL/L (ref 135–147)
TRICUSPID ANNULAR PLANE SYSTOLIC EXCURSION: 2.2 CM
WBC # BLD AUTO: 17.27 THOUSAND/UL (ref 4.31–10.16)

## 2023-07-30 PROCEDURE — 85730 THROMBOPLASTIN TIME PARTIAL: CPT | Performed by: FAMILY MEDICINE

## 2023-07-30 PROCEDURE — 99024 POSTOP FOLLOW-UP VISIT: CPT | Performed by: SURGERY

## 2023-07-30 PROCEDURE — 99232 SBSQ HOSP IP/OBS MODERATE 35: CPT | Performed by: FAMILY MEDICINE

## 2023-07-30 PROCEDURE — 85027 COMPLETE CBC AUTOMATED: CPT | Performed by: STUDENT IN AN ORGANIZED HEALTH CARE EDUCATION/TRAINING PROGRAM

## 2023-07-30 PROCEDURE — 93306 TTE W/DOPPLER COMPLETE: CPT

## 2023-07-30 PROCEDURE — 85730 THROMBOPLASTIN TIME PARTIAL: CPT | Performed by: SURGERY

## 2023-07-30 PROCEDURE — 80048 BASIC METABOLIC PNL TOTAL CA: CPT | Performed by: STUDENT IN AN ORGANIZED HEALTH CARE EDUCATION/TRAINING PROGRAM

## 2023-07-30 PROCEDURE — 93306 TTE W/DOPPLER COMPLETE: CPT | Performed by: INTERNAL MEDICINE

## 2023-07-30 RX ORDER — SENNOSIDES 8.6 MG
1 TABLET ORAL
Status: DISCONTINUED | OUTPATIENT
Start: 2023-07-30 | End: 2023-08-05 | Stop reason: HOSPADM

## 2023-07-30 RX ORDER — CLOPIDOGREL BISULFATE 75 MG/1
75 TABLET ORAL DAILY
Status: DISCONTINUED | OUTPATIENT
Start: 2023-07-31 | End: 2023-08-05 | Stop reason: HOSPADM

## 2023-07-30 RX ORDER — GABAPENTIN 300 MG/1
300 CAPSULE ORAL 3 TIMES DAILY
Status: DISCONTINUED | OUTPATIENT
Start: 2023-07-30 | End: 2023-08-05 | Stop reason: HOSPADM

## 2023-07-30 RX ADMIN — HYDRALAZINE HYDROCHLORIDE 10 MG: 20 INJECTION, SOLUTION INTRAMUSCULAR; INTRAVENOUS at 15:43

## 2023-07-30 RX ADMIN — HYDROMORPHONE HYDROCHLORIDE 0.5 MG: 1 INJECTION, SOLUTION INTRAMUSCULAR; INTRAVENOUS; SUBCUTANEOUS at 03:56

## 2023-07-30 RX ADMIN — Medication 2.5 MG: at 22:06

## 2023-07-30 RX ADMIN — NICOTINE 1 PATCH: 14 PATCH, EXTENDED RELEASE TRANSDERMAL at 08:04

## 2023-07-30 RX ADMIN — ASPIRIN 81 MG CHEWABLE TABLET 81 MG: 81 TABLET CHEWABLE at 08:04

## 2023-07-30 RX ADMIN — CLOPIDOGREL BISULFATE 150 MG: 75 TABLET ORAL at 08:04

## 2023-07-30 RX ADMIN — HEPARIN SODIUM 17 UNITS/KG/HR: 10000 INJECTION, SOLUTION INTRAVENOUS at 10:46

## 2023-07-30 RX ADMIN — OXYCODONE HYDROCHLORIDE 5 MG: 5 TABLET ORAL at 02:34

## 2023-07-30 RX ADMIN — GABAPENTIN 300 MG: 300 CAPSULE ORAL at 15:45

## 2023-07-30 RX ADMIN — OXYCODONE HYDROCHLORIDE 5 MG: 5 TABLET ORAL at 15:44

## 2023-07-30 RX ADMIN — GABAPENTIN 300 MG: 300 CAPSULE ORAL at 22:07

## 2023-07-30 RX ADMIN — OXYCODONE HYDROCHLORIDE 5 MG: 5 TABLET ORAL at 08:15

## 2023-07-30 RX ADMIN — AMLODIPINE BESYLATE 5 MG: 5 TABLET ORAL at 08:04

## 2023-07-30 RX ADMIN — SENNOSIDES 8.6 MG: 8.6 TABLET, FILM COATED ORAL at 22:07

## 2023-07-30 RX ADMIN — GABAPENTIN 300 MG: 300 CAPSULE ORAL at 12:23

## 2023-07-30 NOTE — QUICK NOTE
Post Op Check Note  Kelly Watson 46 y.o. male MRN: [id number]  Unit/Bed#: [facility] 503-01 Encounter: [id number]    ASSESSMENT:  Kelly Watson is a 46 y.o. male who is status post right SFA cutdown, balloon angioplasty and stenting, right leg fasciotomy, angiogram.    Subjective: Patient reports he has some pain and is waiting for pain medicine. Reports he is having difficulty moving his foot which had developed prior to surgery and has not improved since. Physical Exam:  GEN: NAD  CV: RRR  Lung: Normal effort  Ab: Soft, NT/ND  Neuro: A+Ox3, RLE absent dorsiflexion/plantar flexion  Incisions: dressing c/d/i    Blood pressure 145/76, pulse 74, temperature (!) 97.2 °F (36.2 °C), resp. rate 16, height 5' 9" (1.753 m), weight 69.3 kg (152 lb 12.5 oz), SpO2 94 %. ,Body mass index is 22.56 kg/m². Intake/Output Summary (Last 24 hours) at 7/29/2023 2151  Last data filed at 7/29/2023 1815  Gross per 24 hour   Intake 2616 ml   Output 1425 ml   Net 1191 ml       Invasive Devices     Peripheral Intravenous Line  Duration           Peripheral IV 07/28/23 Dorsal (posterior); Right Hand 1 day    Peripheral IV 07/28/23 Right Antecubital 1 day    Peripheral IV 07/29/23 Left Arm <1 day          Drain  Duration           Urethral Catheter Latex 16 Fr. <1 day                VTE Pharmacologic Prophylaxis: Reason for no pharmacologic prophylaxis heparin gtt

## 2023-07-30 NOTE — RESTORATIVE TECHNICIAN NOTE
Restorative Technician Note      Patient Name: Yobany Barnett     Restorative Tech Visit Date: 07/30/23  Note Type: Bracing, Initial consult  Patient Position Upon Consult: Supine  Activity Performed: Repositioned  Brace Applied: Multipodous Boot (donned to RLE with kick stand to prevent ER)  Additional Brace Ordered: No  Patient Position When Brace Applied: Supine  Bracing Recommendations: None  Education Provided: Yes  Patient Position at End of Consult: Supine; All needs within reach  Nurse Communication: Nurse aware of consult, application of brace    Please call Mobility Coordinator at ext. [phone] or on [place] text " [facility]-PT-Restorative Tech" role in regards to bracing instruction and/or adjustment.     Jalen Gomez  DPT, Restorative Technician

## 2023-07-30 NOTE — ASSESSMENT & PLAN NOTE
Patient with 2 weeks of right lower extremity pain consistent with claudication  Lower extremity Dopplers notable for an acute occlusion of the mid and distal superficial  femoral artery. There is an occlusion of the distal anterior tibial artery  She has been transferred to [facility] for vascular evaluation  Continue on heparin drip, aspirin  Status post right SFA cutdown, balloon angioplasty and stenting, right leg fasciotomy-postoperative day 1.     Multi-Podus boots on  Surgical wound covered in dressing

## 2023-07-30 NOTE — PROGRESS NOTES
[facility]  Progress Note  Name: Ramila Mckee  MRN: [id number]  Unit/Bed#: [facility] 503-01 I Date of Admission: 7/28/2023   Date of Service: 7/30/2023 I Hospital Day: 2    Assessment/Plan   * Arterial occlusion  Assessment & Plan  Patient with 2 weeks of right lower extremity pain consistent with claudication  Lower extremity Dopplers notable for an acute occlusion of the mid and distal superficial  femoral artery. There is an occlusion of the distal anterior tibial artery  She has been transferred to [facility] for vascular evaluation  Continue on heparin drip, aspirin  Status post right SFA cutdown, balloon angioplasty and stenting, right leg fasciotomy-postoperative day 1. Multi-Podus boots on  Surgical wound covered in dressing      Hyperlipidemia, mixed  Assessment & Plan  Transaminitis improving  Patient need to start on statin at the time of discharge    Leukocytosis (leucocytosis)  Assessment & Plan  · White count is up to 17,000. Thrombocytosis also noted  · Likely reactive. Monitor    Essential hypertension  Assessment & Plan  Monitor bp while inpatient  Continue amlodipine 5mg daily    Tobacco dependence  Assessment & Plan  Lifestyle changes dsicussed  Nicotine patch while inpatient         VTE Pharmacologic Prophylaxis:   Moderate Risk (Score 3-4) - Pharmacological DVT Prophylaxis Ordered: heparin drip. Patient Centered Rounds: I performed bedside rounds with nursing staff today. Discussions with Specialists or Other Care Team Provider:     Education and Discussions with Family / Patient: Updated patient.      Total Time Spent on Date of Encounter in care of patient: 35 minutes This time was spent on one or more of the following: performing physical exam; counseling and coordination of care; obtaining or reviewing history; documenting in the medical record; reviewing/ordering tests, medications or procedures; communicating with other healthcare professionals and discussing with patient's family/caregivers. Current Length of Stay: 2 day(s)  Current Patient Status: Inpatient   Certification Statement: The patient will continue to require additional inpatient hospital stay due to Status post surgical intervention  Discharge Plan:    Code Status: Level 1 - Full Code    Subjective:   Patient seen and examined. Lying in bed. Pain is controlled  Objective:     Vitals:   Temp (24hrs), Av.7 °F (36.5 °C), Min:97.2 °F (36.2 °C), Max:98.2 °F (36.8 °C)    Temp:  [97.2 °F (36.2 °C)-98.2 °F (36.8 °C)] 97.9 °F (36.6 °C)  HR:  [63-74] 64  Resp:  [12-16] 13  BP: (136-215)/(68-96) 171/77  SpO2:  [93 %-100 %] 97 %  Body mass index is 23.92 kg/m². Input and Output Summary (last 24 hours): Intake/Output Summary (Last 24 hours) at 2023 1554  Last data filed at 2023 1200  Gross per 24 hour   Intake 3262.47 ml   Output 3150 ml   Net 112.47 ml       Physical Exam:   Physical Exam  Constitutional:       General: He is not in acute distress. Appearance: He is not ill-appearing. HENT:      Head: Normocephalic. Nose: Nose normal.   Eyes:      General: No scleral icterus. Cardiovascular:      Rate and Rhythm: Normal rate and regular rhythm. Pulses: Normal pulses. Pulmonary:      Effort: Pulmonary effort is normal.      Breath sounds: Normal breath sounds. Abdominal:      General: There is no distension. Tenderness: There is no abdominal tenderness. Genitourinary:     Comments: Quiñones catheter present  Musculoskeletal:         General: Normal range of motion. Skin:     General: Skin is warm. Coloration: Skin is not jaundiced. Neurological:      Mental Status: He is alert. Mental status is at baseline.         Additional Data:     Labs:  Results from last 7 days   Lab Units 23  0503 23  1042 23  0649   WBC Thousand/uL 17.27*   < > 11.65*   HEMOGLOBIN g/dL 14.1   < > 14.9   HEMATOCRIT % 41.7   < > 45.2   PLATELETS Thousands/uL 531*   < > 553*   NEUTROS PCT %  --   --  56   LYMPHS PCT %  --   --  31   MONOS PCT %  --   --  9   EOS PCT %  --   --  3    < > = values in this interval not displayed. Results from last 7 days   Lab Units 07/30/23  0503 07/29/23  0649 07/29/23  0005   SODIUM mmol/L 136   < > 134*   POTASSIUM mmol/L 3.9   < > 4.2   CHLORIDE mmol/L 105   < > 103   CO2 mmol/L 23   < > 27   BUN mg/dL 12   < > 15   CREATININE mg/dL 0.78   < > 0.81   ANION GAP mmol/L 8   < > 4   CALCIUM mg/dL 8.5   < > 9.4   ALBUMIN g/dL  --   --  3.5   TOTAL BILIRUBIN mg/dL  --   --  0.81   ALK PHOS U/L  --   --  94   ALT U/L  --   --  83*   AST U/L  --   --  69*   GLUCOSE RANDOM mg/dL 163*   < > 105    < > = values in this interval not displayed. Results from last 7 days   Lab Units 07/29/23  1042   INR  1.03                   Lines/Drains:  Invasive Devices     Peripheral Intravenous Line  Duration           Peripheral IV 07/28/23 Dorsal (posterior); Right Hand 1 day    Peripheral IV 07/28/23 Right Antecubital 1 day    Peripheral IV 07/29/23 Left Arm 1 day          Drain  Duration           Urethral Catheter Latex 16 Fr. 1 day              Urinary Catheter:  Goal for removal: Voiding trial when ambulation improves               Imaging: No new images to review    Recent Cultures (last 7 days):         Last 24 Hours Medication List:   Current Facility-Administered Medications   Medication Dose Route Frequency Provider Last Rate   • amLODIPine  5 mg Oral Daily Luis Felipe Weinstein DO     • aspirin  81 mg Oral Daily Luis Felipe Williamson DO     • [START ON 7/31/2023] clopidogrel  75 mg Oral Daily Luis Felipe Williamson DO     • gabapentin  300 mg Oral TID Tammy Christensen MD     • heparin (porcine)  3-20 Units/kg/hr (Order-Specific) Intravenous Titrated Luis Felipe Williamson DO 19 Units/kg/hr (07/30/23 1212)   • hydrALAZINE  10 mg Intravenous Q6H PRN Luis Felipe Williamson DO     • HYDROmorphone  0.5 mg Intravenous Q3H PRN Luis Felipe Williamson DO     • labetalol  5 mg Intravenous Q15 Min PRN Katelyn , DO     • lactated ringers  500 mL Intravenous Once PRN Luis Felipe Schmidt, DO      And   • lactated ringers  500 mL Intravenous Once PRN Luis Felipe Schmidt, DO     • nicotine  1 patch Transdermal Daily Luis Felipe Williamson, DO     • oxyCODONE  2.5 mg Oral Q4H PRN Luis Felipe Schmidt, DO      Or   • oxyCODONE  5 mg Oral Q4H PRN Luis Felipe Schmidt, DO     • senna  1 tablet Oral HS Luis Felipe Williamson, DO     • sodium chloride  500 mL Intravenous Once PRN Luis Felipe Williamson, DO      And   • sodium chloride  500 mL Intravenous Once PRN Katelyn , DO          Today, Patient Was Seen By: Mook Sweet MD    **Please Note: This note may have been constructed using a voice recognition system. **

## 2023-07-30 NOTE — PROGRESS NOTES
Progress Note - Vascular Surgery     Assessment/Plan:  46 y.o. male who is status post right SFA cutdown, balloon angioplasty and stenting, right leg fasciotomy, angiogram, +motor deficits. Continue heparin drip. Order multipodus boot. Continue NV checks. Pain control.       Subjective:  Patient reports that in the early morning hours his right foot went numb and he is having worsened motor deficits. Vitals:  /73 (BP Location: Left arm)   Pulse 64   Temp 98.2 °F (36.8 °C) (Oral)   Resp 16   Ht 5' 9" (1.753 m)   Wt 73.6 kg (162 lb 3.2 oz)   SpO2 95%   BMI 23.95 kg/m²     I/Os:  I/O last 3 completed shifts:   In: 2616 [I.V.:2616]  Out: 1425 [Urine:1325; Blood:100]  I/O this shift:  In: 106.5 [I.V.:106.5]  Out: 1525 [Urine:1525]    Lab Results and Cultures:   Lab Results   Component Value Date    WBC 17.27 (H) 07/30/2023    HGB 14.1 07/30/2023    HCT 41.7 07/30/2023    MCV 83 07/30/2023     (H) 07/30/2023     Lab Results   Component Value Date    CALCIUM 8.5 07/30/2023    K 3.9 07/30/2023    CO2 23 07/30/2023     07/30/2023    BUN 12 07/30/2023    CREATININE 0.78 07/30/2023     Lab Results   Component Value Date    INR 1.03 07/29/2023    INR 1.08 07/28/2023    INR 1.08 10/01/2018    PROTIME 13.7 07/29/2023    PROTIME 13.8 07/28/2023    PROTIME 13.9 10/01/2018          Medications:  Current Facility-Administered Medications   Medication Dose Route Frequency   • amLODIPine (NORVASC) tablet 5 mg  5 mg Oral Daily   • aspirin chewable tablet 81 mg  81 mg Oral Daily   • clopidogrel (PLAVIX) tablet 150 mg  150 mg Oral Daily   • heparin (porcine) 25,000 units in 0.45% NaCl 250 mL infusion (premix)  3-20 Units/kg/hr (Order-Specific) Intravenous Titrated   • hydrALAZINE (APRESOLINE) injection 10 mg  10 mg Intravenous Q6H PRN   • HYDROmorphone (DILAUDID) injection 0.5 mg  0.5 mg Intravenous Q3H PRN   • labetalol (NORMODYNE) injection 5 mg  5 mg Intravenous Q15 Min PRN   • lactated ringers bolus 500 mL  500 mL Intravenous Once PRN    And   • lactated ringers bolus 500 mL  500 mL Intravenous Once PRN   • lactated ringers infusion  100 mL/hr Intravenous Continuous   • lactated ringers infusion  125 mL/hr Intravenous Continuous   • nicotine (NICODERM CQ) 14 mg/24hr TD 24 hr patch 1 patch  1 patch Transdermal Daily   • oxyCODONE (ROXICODONE) split tablet 2.5 mg  2.5 mg Oral Q4H PRN    Or   • oxyCODONE (ROXICODONE) IR tablet 5 mg  5 mg Oral Q4H PRN   • sodium chloride 0.9 % bolus 500 mL  500 mL Intravenous Once PRN    And   • sodium chloride 0.9 % bolus 500 mL  500 mL Intravenous Once PRN         Physical Exam:    General appearance: alert and oriented, in no acute distress  Skin: warm to touch  Neurologic: RLE absent dorsiflexion/plantarflexion  Head: Normocephalic, without obvious abnormality, atraumatic  Eyes: EOMI  Lungs: No resp distress  Heart: No edema  Abdomen: soft, NT  Extremities: warm to touch, unable to dorsi/plantarflex right foot    Wound/Incision:  Fasciotomy dressing c/d/i   Cutdown site with skin glue intact    Pulse exam:  Femoral: Right: 2+ Left: 2+  DP: Right: doppler signal Left: 1+  PT: Right: doppler signal Left: 1+    Cheryal SAM Roman  7/30/2023

## 2023-07-30 NOTE — UTILIZATION REVIEW
Initial Clinical Review    Admission: Date/Time/Statement:   Admission Orders (From admission, onward)     Ordered        07/28/23 2312  Inpatient Admission  Once                      Orders Placed This Encounter   Procedures   • Inpatient Admission     Standing Status:   Standing     Number of Occurrences:   1     Order Specific Question:   Level of Care     Answer:   Med Surg [16]     Order Specific Question:   Estimated length of stay     Answer:   More than 2 Midnights     Order Specific Question:   Certification     Answer:   I certify that inpatient services are medically necessary for this patient for a duration of greater than two midnights. See H&P and MD Progress Notes for additional information about the patient's course of treatment. Initial Presentation: 46 y.o. male to [facility] transferred from [facility] ED where he presented with 2 wks of RLE pain consistent with claudication. Lower extremity Dopplers notable for F an acute occlusion of the mid and distal superficial femoral artery. There is an occlusion of the distal anterior tibial artery. He was transferred to [facility] for vascular evaluation and higher level of care. ADMITTED INPATIENT to M/S UNIT with ARTERIAL OCCLUSION --- Vascular consulted. Continue on heparin drip, aspirin, npo at mdn. Hold statin pending CMP for assessment of liver enzymes. Continue pta amlodipine for HTN. Analgesics prn. IR consulted with plan for RLE a-gram with possible intervention in the upcoming week pending IR schedule availability. Date: 7/29   Day 2:  OPERATIVE REPORT  SURGERY DATE: 7/29/2023   Post-Op Diagnosis Codes:     * Pain in right leg [M79.604]     * Arterial occlusion [I70.90]   Procedure(s):  Right - RIGHT SFA CUTDOWN, RIGHT LEG ANGIOGRAM, RIGHT LEG BALLOON ANGIOPLASTY / STENT. RIGHT LEG FASCIOTOMY   Anesthesia Type:   General  Operative Findings:  Chronic thrombus in distal SFA, about 2 cm occlusion.   There is calcified plaque in that region, so could be acute on chronic process. After 6x50 Viabhan and 6x40 mm Shagufta, there is complete resolution of the occlusion, remainder of the SFA, Popliteal trifucation are patent with 3 vessel runoff. Lv1 step down unit post-op. 7/29 9 PM note - Pt reports he has some pain and is waiting for pain medicine. Reports he is having difficulty moving his foot which had developed prior to surgery and has not improved since. RLE absent dorsiflexion/plantar flexion. Dressing c/d/i. Continue neuro checks. 7/30 POD #1 - Patient reports that in the early morning hours his right foot went numb and he is having worsened motor deficits. Fasciotomy dressing c/d/i. Cutdown site with skin glue intact. Pulse exam: Femoral: Right: 2+ Left: 2+. DP: Right: doppler signal Left: 1+. PT: Right: doppler signal Left: 1+. +motor deficits. Continue heparin drip. Order multipodus boot. Continue NV checks q4H. Pain control. Reg diet. I/Os. OOB/ambulate. SCDs. Analgesics prn.       Wt Readings from Last 1 Encounters:   07/30/23 73.6 kg (162 lb 3.2 oz)     Additional Vital Signs:   Date/Time Temp Pulse Resp BP MAP (mmHg) SpO2 Calculated FIO2 (%) - Nasal Cannula O2 Flow Rate (L/min) Nasal Cannula O2 Flow Rate (L/min) O2 Device Cardiac (WDL) Patient Position - Orthostatic VS   07/30/23 06:41:33 97.7 °F (36.5 °C) -- 13 156/78 -- 98 % -- -- -- -- -- --   07/30/23 0514 -- 64 -- 143/73 100 -- -- -- -- -- -- Lying   07/30/23 0234 -- 63 16 -- -- -- -- -- -- -- -- --   07/29/23 22:37:17 98.2 °F (36.8 °C) 70 16 145/69 99 -- -- -- -- -- -- Lying   07/29/23 2100 -- 70 -- 157/77 111 -- -- -- -- -- -- --   07/29/23 2045 -- 72 -- --  -- -- -- -- -- -- -- --   BP: had BP cycling, family removed BP cuff at 07/29/23 2045 07/29/23 2030 -- 72 -- --  -- -- -- -- -- -- -- --   BP: had BP cycling, family removed BP cuff at 07/29/23 2030 07/29/23 2015 -- 72 -- 136/68 95 -- -- -- -- -- -- --   07/29/23 2004 -- 74 -- 145/76 101 95 % -- -- -- None (Room air) -- -- 07/29/23 19:08:28 97.2 °F (36.2 °C) Abnormal  66 -- 147/85 106 94 % -- -- -- -- -- --   07/29/23 1830 98 °F (36.7 °C) 64 16 174/85 Abnormal  120 93 % 28 -- 2 L/min Nasal cannula WDL --   07/29/23 1815 -- 68 12 179/92 Abnormal  123 95 % 28 -- 2 L/min Nasal cannula -- --   07/29/23 1800 -- 66 16 160/94 124 95 % -- 6 L/min -- Simple mask -- --   07/29/23 1745 -- 70 14 215/89 Abnormal  140 96 % -- 6 L/min -- Simple mask -- --   07/29/23 1740 97.4 °F (36.3 °C) Abnormal  71 16 208/96 Abnormal  145 100 % -- 6 L/min -- Simple mask WDL --   07/29/23 0800 -- -- -- -- -- 97 % -- -- -- None (Room air) -- --   07/29/23 07:21:39 97.9 °F (36.6 °C) 68 16 146/84 105 96 % -- -- -- -- -- --   07/28/23 23:14:42 98.2 °F (36.8 °C) 71 19 178/92 Abnormal  121 96 % -- -- -- None (Room air) -- --   07/28/23 23:13:50 98.2 °F (36.8 °C) 68 -- 168/118 Abnormal  135 97 % -- -- -- -- -- --     Pertinent Labs/Diagnostic Test Results:   CTA abdominal w run off w wo contrast    (Results Pending)   IR lower extremity angiogram    (Results Pending)         Results from last 7 days   Lab Units 07/30/23  0503 07/29/23  1042 07/29/23  0649 07/28/23  1601   WBC Thousand/uL 17.27* 11.34* 11.65* 12.68*   HEMOGLOBIN g/dL 14.1 16.4 14.9 16.4   HEMATOCRIT % 41.7 47.0 45.2 48.0   PLATELETS Thousands/uL 531* 556* 553* 595*   NEUTROS ABS Thousands/µL  --   --  6.49  --      Results from last 7 days   Lab Units 07/30/23  0503 07/29/23  0649 07/29/23  0005 07/28/23  1601   SODIUM mmol/L 136 136 134* 134*   POTASSIUM mmol/L 3.9 4.0 4.2 4.0   CHLORIDE mmol/L 105 106 103 100   CO2 mmol/L 23 28 27 28   ANION GAP mmol/L 8 2 4 6   BUN mg/dL 12 18 15 15   CREATININE mg/dL 0.78 0.77 0.81 0.77   EGFR ml/min/1.73sq m 102 102 100 102   CALCIUM mg/dL 8.5 9.1 9.4 10.4*     Results from last 7 days   Lab Units 07/29/23  0005   AST U/L 69*   ALT U/L 83*   ALK PHOS U/L 94   TOTAL PROTEIN g/dL 8.4   ALBUMIN g/dL 3.5   TOTAL BILIRUBIN mg/dL 0.81     Results from last 7 days   Lab Units 07/30/23  0503 07/29/23  0649 07/29/23  0005 07/28/23  1601   GLUCOSE RANDOM mg/dL 163* 106 105 100     Results from last 7 days   Lab Units 07/30/23  0503 07/29/23 2029 07/29/23  1042 07/28/23  2152 07/28/23  1601   PROTIME seconds  --   --  13.7  --  13.8   INR   --   --  1.03  --  1.08   PTT seconds 62* 123* 51*   < > 32    < > = values in this interval not displayed. Past Medical History:   Diagnosis Date   • Hypertension    • Lung nodule    • Prediabetes      Present on Admission:  • Hyperlipidemia, mixed  • Essential hypertension  • Tobacco dependence      Admitting Diagnosis: acute arterial occlusion RLE  Age/Sex: 46 y.o. male  Admission Orders:  Scheduled Medications:  amLODIPine, 5 mg, Oral, Daily  aspirin, 81 mg, Oral, Daily  [START ON 7/31/2023] clopidogrel, 75 mg, Oral, Daily  nicotine, 1 patch, Transdermal, Daily  senna, 1 tablet, Oral, HS    Continuous IV Infusions:  heparin (porcine), 3-20 Units/kg/hr (Order-Specific), Intravenous, Titrated    PRN Meds:  hydrALAZINE, 10 mg, Intravenous, Q6H PRN  HYDROmorphone, 0.5 mg, Intravenous, Q3H PRN 7/29 x1, 7/30 x1  labetalol, 5 mg, Intravenous, Q15 Min PRN  lactated ringers, 500 mL, Intravenous, Once PRN  oxyCODONE, 2.5 mg, Oral, Q4H PRN   Or  oxyCODONE, 5 mg, Oral, Q4H PRN 7/28 x1 7/29 x1, 7/30 x1  sodium chloride, 500 mL, Intravenous, Once PRN        IP CONSULT TO VASCULAR SURGERY  IP CONSULT TO CASE MANAGEMENT  INPATIENT CONSULT TO IR    Network Utilization Review Department  ATTENTION: Please call with any questions or concerns to [phone] and carefully listen to the prompts so that you are directed to the right person. All voicemails are confidential.  Teresa Spencer all requests for admission clinical reviews, approved or denied determinations and any other requests to dedicated fax number below belonging to the campus where the patient is receiving treatment.  List of dedicated fax numbers for the Facilities:  [place] DENIALS (Administrative/Medical Necessity) [phone] [address] (Maternity/NICU/Pediatrics) [address] 1000 [facility] [phone]   [address] [facility] [address] [address] [facility] [address] [phone]

## 2023-07-31 ENCOUNTER — APPOINTMENT (INPATIENT)
Dept: RADIOLOGY | Facility: HOSPITAL | Age: 53
DRG: 253 | End: 2023-07-31
Payer: COMMERCIAL

## 2023-07-31 LAB
ALBUMIN SERPL BCP-MCNC: 3.1 G/DL (ref 3.5–5)
ALP SERPL-CCNC: 73 U/L (ref 46–116)
ALT SERPL W P-5'-P-CCNC: 57 U/L (ref 12–78)
ANION GAP SERPL CALCULATED.3IONS-SCNC: 1 MMOL/L
APTT PPP: 68 SECONDS (ref 23–37)
APTT PPP: 70 SECONDS (ref 23–37)
AST SERPL W P-5'-P-CCNC: 38 U/L (ref 5–45)
BASOPHILS # BLD AUTO: 0.06 THOUSANDS/ÂΜL (ref 0–0.1)
BASOPHILS NFR BLD AUTO: 0 % (ref 0–1)
BILIRUB SERPL-MCNC: 0.66 MG/DL (ref 0.2–1)
BUN SERPL-MCNC: 11 MG/DL (ref 5–25)
CALCIUM ALBUM COR SERPL-MCNC: 9.2 MG/DL (ref 8.3–10.1)
CALCIUM SERPL-MCNC: 8.5 MG/DL (ref 8.3–10.1)
CHLORIDE SERPL-SCNC: 108 MMOL/L (ref 96–108)
CO2 SERPL-SCNC: 27 MMOL/L (ref 21–32)
CREAT SERPL-MCNC: 0.64 MG/DL (ref 0.6–1.3)
EOSINOPHIL # BLD AUTO: 0.06 THOUSAND/ÂΜL (ref 0–0.61)
EOSINOPHIL NFR BLD AUTO: 0 % (ref 0–6)
ERYTHROCYTE [DISTWIDTH] IN BLOOD BY AUTOMATED COUNT: 13.2 % (ref 11.6–15.1)
GFR SERPL CREATININE-BSD FRML MDRD: 111 ML/MIN/1.73SQ M
GLUCOSE SERPL-MCNC: 112 MG/DL (ref 65–140)
HCT VFR BLD AUTO: 37.3 % (ref 36.5–49.3)
HGB BLD-MCNC: 12.7 G/DL (ref 12–17)
IMM GRANULOCYTES # BLD AUTO: 0.06 THOUSAND/UL (ref 0–0.2)
IMM GRANULOCYTES NFR BLD AUTO: 0 % (ref 0–2)
LYMPHOCYTES # BLD AUTO: 5.12 THOUSANDS/ÂΜL (ref 0.6–4.47)
LYMPHOCYTES NFR BLD AUTO: 29 % (ref 14–44)
MCH RBC QN AUTO: 27.8 PG (ref 26.8–34.3)
MCHC RBC AUTO-ENTMCNC: 34 G/DL (ref 31.4–37.4)
MCV RBC AUTO: 82 FL (ref 82–98)
MONOCYTES # BLD AUTO: 1.46 THOUSAND/ÂΜL (ref 0.17–1.22)
MONOCYTES NFR BLD AUTO: 8 % (ref 4–12)
NEUTROPHILS # BLD AUTO: 10.91 THOUSANDS/ÂΜL (ref 1.85–7.62)
NEUTS SEG NFR BLD AUTO: 63 % (ref 43–75)
NRBC BLD AUTO-RTO: 0 /100 WBCS
PLATELET # BLD AUTO: 499 THOUSANDS/UL (ref 149–390)
PMV BLD AUTO: 8.4 FL (ref 8.9–12.7)
POTASSIUM SERPL-SCNC: 3.5 MMOL/L (ref 3.5–5.3)
PROT SERPL-MCNC: 7.1 G/DL (ref 6.4–8.4)
RBC # BLD AUTO: 4.57 MILLION/UL (ref 3.88–5.62)
SODIUM SERPL-SCNC: 136 MMOL/L (ref 135–147)
WBC # BLD AUTO: 17.67 THOUSAND/UL (ref 4.31–10.16)

## 2023-07-31 PROCEDURE — 97167 OT EVAL HIGH COMPLEX 60 MIN: CPT

## 2023-07-31 PROCEDURE — 80053 COMPREHEN METABOLIC PANEL: CPT | Performed by: FAMILY MEDICINE

## 2023-07-31 PROCEDURE — 71275 CT ANGIOGRAPHY CHEST: CPT

## 2023-07-31 PROCEDURE — 74174 CTA ABD&PLVS W/CONTRAST: CPT

## 2023-07-31 PROCEDURE — 85730 THROMBOPLASTIN TIME PARTIAL: CPT | Performed by: FAMILY MEDICINE

## 2023-07-31 PROCEDURE — 99232 SBSQ HOSP IP/OBS MODERATE 35: CPT | Performed by: FAMILY MEDICINE

## 2023-07-31 PROCEDURE — 85025 COMPLETE CBC W/AUTO DIFF WBC: CPT | Performed by: STUDENT IN AN ORGANIZED HEALTH CARE EDUCATION/TRAINING PROGRAM

## 2023-07-31 PROCEDURE — 97163 PT EVAL HIGH COMPLEX 45 MIN: CPT

## 2023-07-31 PROCEDURE — 99024 POSTOP FOLLOW-UP VISIT: CPT | Performed by: SURGERY

## 2023-07-31 RX ORDER — RIVAROXABAN 15 MG-20MG
KIT ORAL
Qty: 1 EACH | Refills: 0 | Status: SHIPPED | OUTPATIENT
Start: 2023-07-31 | End: 2023-08-04

## 2023-07-31 RX ADMIN — GABAPENTIN 300 MG: 300 CAPSULE ORAL at 16:58

## 2023-07-31 RX ADMIN — OXYCODONE HYDROCHLORIDE 5 MG: 5 TABLET ORAL at 16:58

## 2023-07-31 RX ADMIN — HYDROMORPHONE HYDROCHLORIDE 0.5 MG: 1 INJECTION, SOLUTION INTRAMUSCULAR; INTRAVENOUS; SUBCUTANEOUS at 06:36

## 2023-07-31 RX ADMIN — HYDRALAZINE HYDROCHLORIDE 10 MG: 20 INJECTION, SOLUTION INTRAMUSCULAR; INTRAVENOUS at 00:29

## 2023-07-31 RX ADMIN — SENNOSIDES 8.6 MG: 8.6 TABLET, FILM COATED ORAL at 21:19

## 2023-07-31 RX ADMIN — HEPARIN SODIUM 21 UNITS/KG/HR: 10000 INJECTION, SOLUTION INTRAVENOUS at 05:52

## 2023-07-31 RX ADMIN — GABAPENTIN 300 MG: 300 CAPSULE ORAL at 08:31

## 2023-07-31 RX ADMIN — CLOPIDOGREL BISULFATE 75 MG: 75 TABLET ORAL at 08:31

## 2023-07-31 RX ADMIN — NICOTINE 1 PATCH: 14 PATCH, EXTENDED RELEASE TRANSDERMAL at 08:32

## 2023-07-31 RX ADMIN — IOHEXOL 100 ML: 350 INJECTION, SOLUTION INTRAVENOUS at 11:40

## 2023-07-31 RX ADMIN — AMLODIPINE BESYLATE 5 MG: 5 TABLET ORAL at 08:31

## 2023-07-31 RX ADMIN — ASPIRIN 81 MG CHEWABLE TABLET 81 MG: 81 TABLET CHEWABLE at 08:31

## 2023-07-31 RX ADMIN — GABAPENTIN 300 MG: 300 CAPSULE ORAL at 21:19

## 2023-07-31 NOTE — DISCHARGE INSTR - AVS FIRST PAGE
DISCHARGE INSTRUCTIONS  LEG/BYPASS SURGERY    ACTIVITY:   Limit your physical activity to walking for the first week and then increase your activity as tolerated. If you become short of breath or tired, stop and rest.  You may require help with walking or feel more secure with something to lean on. Walking up steps and normal activities may be resumed as you feel ready. Most people tire easily for the first few weeks following leg surgery. This improves as conditioning returns. Avoid strenuous activity such as vigorous exercise. Avoid heavy lifting (do not lift more than 15 pounds) for the first four weeks after surgery. You should not drive a car for at least two weeks following discharge from the hospital and you are off all narcotic pain medication. You may ride in a car. DIET:  Resume your normal diet. Good nutrition is important for healing of your incision. If you are discharged on narcotics for pain control, continue taking your stool softeners until you are having regular bowel movements. INCISION:  You should shower daily. Wash incision daily with soap and water, but do not rub or scrub the incision; rinse thoroughly and pat dry. You may have stitches or staples to close your incision and it is okay for these to get wet. Do not bathe in a tub or swim for the first 4 week following surgery or if you have any open wounds. It is normal to have swelling or discoloration around the incision. If increasing redness or pain develops, call our office immediately. Numbness in the region of the incision may occur following the surgery. This normally improves over six to twelve months. You may have surgical glue over your incisions. There are stitches present under the skin which will absorb on their own. The glue is used to cover the access, assist in closure, and prevent contamination. This adhesive will darken and peel away on its own within one to two weeks. Do not pick at it.     If you have a groin wound/incision, place a clean dry piece of gauze to cover your groin incision to keep incision clean and dry and prevent your skin from sticking together. Change gauze daily. You may have staples or stitches at your incisions. These will be removed at your follow-up appointment or when they are ready to come out. If you have a dressing over your surgical site, remove this on the second day after surgery. If you have foot or leg wounds, please follow your podiatrist/wound care doctor's instructions for care. If any of your incisions are open and require dressing changes, you will be given instructions for your daily incision care. If you are not able to change the dressings, a visiting nurse will be arranged. DO NOT put any powders, creams, ointments, or lotions on your incision. LEG SWELLING: Most patients have noticeable leg swelling after leg surgery. This usually improves within a few weeks. If swelling is present, elevate the leg whenever possible. Avoid sitting with the leg hanging down for prolonged periods of time. Walking is beneficial.  An ACE bandage or support stocking may be helpful, but this should be discussed with your physician prior to use if you have a bypass. FOLLOW UP STUDIES:  Doppler ultrasound studies are very important for long-term management. Your surgeon will arrange this at your first postoperative visit. Repeat studies are then scheduled every three months for the first year and periodically after this. FOLLOW UP APPOINTMENTS:  Making and keeping follow up appointments and ultrasound tests are important to your recovery. If you have difficulty making it to or keeping your follow up appointments, call the office. If you have increased pain, fever >101.5, increased drainage, redness or a bad smell at your surgery site, new coldness/numbness of your arm or leg, please call us immediately and GO directly to the ER.     Yusuf rick/ Maco Carson PA-C: 8/15/2023 at [facility] office        PLEASE CALL THE OFFICE IF YOU HAVE ANY QUESTIONS  [phone]  [phone]  [address] [facility], [place]), 26[address] [facility]  [address] [address],[facility], [facility], [address]  [address], [place], [address] [facility] [address]  [address] [facility], [address], [place], [address]  1001 [facility],Sixth Floor, 1st Floor, [place], [address]  [address], [address], 401 [facility], [place], [address] [facility] [address] [place]  [address], [address] [place] [address] [place] ([place]), 1200 [facility]  1501 [facility], 319 [facility], 161 [address] Waylon Watson [facility]  [address]   _______________________________________________________________    DISCHARGE INSTRUCTIONS  LOVENOX GUIDELINES       1. Your PT/INR or Coumadin Level (blood test) must be in a therapeutic range before you stop the Lovenox injection. For example: you are prescribed 5 doses of Lovenox initially, however, you may require more Lovenox if your PT/INR results are too low. This will be determined by the results of your blood work. You CANNOT automatically stop the Lovenox when it runs out. Your doctor will tell when it is safe to stop Lovenox. If necessary, more Lovenox will be prescribed for you. 2. It is very common to have frequent blood work to check your PT/INR within the first few weeks when you started on Coumadin. Everyone reacts differently, and it may take some time to find the correct dose that is best for you. While on Lovenox, you will need to have blood drawn very frequently, sometimes even daily. 3. Please remember that you should take your Coumadin in the evening and at the same time every day. Also, you should administer your Lovenox at the same time every day. 4. Please remember to have your blood drawn first thing in the morning. This is especially important if you go to a lab that is a draw station only.  For example: [facility] draw your blood, but it is sent to the [facility] for the test to be performed. Your doctor will not receive the blood test results until late in the day. If your blood is drawn too late, your doctor will not receive the blood test results until the following day, which means your doctor will not be able to adjust your dose of Coumadin and may lead to prolonged usage of Lovenox. 5. If you have your PT/INR done and do not hear from your doctor's office by 4:00 pm, please call the office and ask to speak with a nurse. 7. Please call your doctor's office to schedule a follow up office visit 2 -3 weeks after discharge. PLEASE CALL THE OFFICE IF YOU HAVE ANY QUESTIONS  [phone]  [phone]  [address] [facility], [place]), 2601 [facility]  3000 [facility], located within [facility], [address] [facility]  [address], [facility], 630 [facility]  [address] [facility] [address], 161 [facility], [place], [address]  1001 [facility],Sixth Floor, 1st Floor, [place], [address]  [address], [address], 401 [address], [place], [address] [facility] [address] [place]  [address], 4800 [facility] [place]), 1200 [facility]  1501 [facility], 319 [facility], [address] 64 [place] [address], [place]  [address]

## 2023-07-31 NOTE — OCCUPATIONAL THERAPY NOTE
Occupational Therapy Evaluation     Patient Name: Sharon Bolton  HDOKW'G Date: 7/31/2023  Problem List  Principal Problem:    Arterial occlusion  Active Problems:    Tobacco dependence    Essential hypertension    Leukocytosis (leucocytosis)    Hyperlipidemia, mixed    Past Medical History  Past Medical History:   Diagnosis Date    Hypertension     Lung nodule     Prediabetes      Past Surgical History  Past Surgical History:   Procedure Laterality Date    IR LOWER EXTREMITY ANGIOGRAM  7/29/2023    THROMBECTOMY W/ EMBOLECTOMY Right 7/29/2023    Procedure: RIGHT LEG BALLOON ANGIOPLASTY, STENT, FASCIOTOMY;  Surgeon: Lynne Obrien MD;  Location: [facility];  Service: Vascular             07/31/23 1032   OT Last Visit   OT Visit Date 07/31/23   Note Type   Note type Evaluation   Pain Assessment   Pain Score 5   Pain Location/Orientation Orientation: Right;Location: Leg   Restrictions/Precautions   Weight Bearing Precautions Per Order No   Other Precautions Pain; Fall Risk;WBS;Multiple lines;Telemetry   Home Living   Type of [address] [place] [address] Dr One level;Performs ADLs on one level;Stairs to enter with rails   Bathroom Shower/Tub Walk-in shower   [address] [facility] chair   [address] Crutches;Walker   Additional Comments Pt lives in a one level house with 3 JEFF, was most recently using [facility] for mobility in order to be NWB due to LE pain however prior to was not using any DME, reports the DME he has at home is his wife's as she has MS   Prior Function   Level of [place] Independent with ADLs; Independent with functional mobility; Independent with IADLS   Lives With Spouse   Receives Help From Family   IADLs Independent with driving; Independent with meal prep; Independent with medication management   Falls in the last 6 months 0   Vocational Full time employment   Lifestyle   Autonomy I with ADLS and IADLS +    Reciprocal Relationships supportive family however reports his wife is able to provide limited support as she has MS   Service to Others works FT as an    Intrinsic Gratification Working on cars   Subjective   Subjective "I'm in a lot of pain"   ADL   Where Assessed Edge of bed   Grooming Assistance 5  621 [facility] 5  621 [facility] 4  [address] [facility] 5  859 [facility] 4  [address] [facility] [address]  4  Minimal Assistance   Bed Mobility   Supine to Sit 4  Minimal assistance   Additional items Assist x 1; Increased time required;LE management   Additional Comments OOB at end of session   Transfers   Sit to Stand 4  Minimal assistance   Additional items Assist x 1; Increased time required   Stand to Sit 4  Minimal assistance   Additional items Assist x 1; Increased time required   Stand pivot 4  Minimal assistance   Additional items Assist x 1; Increased time required   Additional Comments Declines use of AD, maintains NWB to chair   Functional Mobility   Additional Comments Will continue to assess   Balance   Static Sitting Fair +   Dynamic Sitting Fair   Static Standing Fair -   Dynamic Standing Poor +   Ambulatory Poor +   Activity Tolerance   Activity Tolerance Patient limited by fatigue   Medical Staff Made Aware DPT, NSG aware   Nurse Made Aware yes, cleaared for JOANNIE   RUE Assessment   RUE Assessment WFL   LUE Assessment   LUE Assessment WFL   Psychosocial   Psychosocial (WDL) WDL   Cognition   Overall Cognitive Status WFL   Arousal/Participation Alert; Responsive; Cooperative   Attention Attends with cues to redirect   Orientation Level Oriented X4   Memory Within functional limits   Following Commands Follows one step commands without difficulty   Comments Overall cooperative, pt reports being irratible 2* increased pain following dressing change this AM. Declines recommendation for DME use at this time   Assessment   Limitation Decreased ADL status; Decreased UE strength;Decreased Safe judgement during ADL;Decreased endurance;Decreased self-care trans;Decreased high-level ADLs   Prognosis Good   Assessment Pt is a 46 y.o. male seen for OT evaluation s/p admit to [facility] on 7/28/2023 w/ Arterial occlusion. Pt is now status post right SFA cutdown, balloon angioplasty and stenting, right leg fasciotomy, angiogram. There is no WBS in the orders per vascular however treated as NWB due to pain and open fasciotomy. Comorbidities affecting pt's functional performance at time of assessment include: Hypertension, Lung nodule, and Prediabetes. Personal factors affecting pt at time of IE include:steps to enter environment, limited home support, difficulty performing ADLS, difficulty performing IADLS , limited insight into deficits, decreased initiation and engagement  and health management . Prior to admission, pt was I with all ADLS and IADLs. Upon evaluation: Pt presents supine and is agreeable to OTIE, all vitals WNLs. Pt requires overall Min A 2* the following deficits impacting occupational performance: weakness, decreased strength, decreased balance, decreased tolerance, decreased safety awareness, increased pain and orthopedic restrictions. Pt resting in chair at end of session with all needs in reach, alarm on, all lines in place and SCD's on. Pt to benefit from continued skilled OT tx while in the hospital to address deficits as defined above and maximize level of functional independence w ADL's and functional mobility. Occupational Performance areas to address include: grooming, bathing/shower, toilet hygiene, dressing, health maintenance, functional mobility, community mobility and clothing management. The patient's raw score on the AM-PAC Daily Activity inpatient short form is 21  , standardized score is 44.27  , greater than 39.4.  Patients at this level are likely to benefit from discharge to home. Please refer to the recommendation of the Occupational Therapist for safe discharge planning. Goals   Patient Goals To have less pain   LTG Time Frame 10-14   Long Term Goal #1 see below   Plan   Treatment Interventions ADL retraining;Functional transfer training;UE strengthening/ROM; Endurance training;Equipment evaluation/education;Patient/family training; Compensatory technique education;Continued evaluation; Energy conservation; Activityengagement   Goal Expiration Date 08/14/23   OT Frequency 2-3x/wk   Recommendation   OT Discharge Recommendation No rehabilitation needs   AM-PAC Daily Activity Inpatient   Lower Body Dressing 3   Bathing 3   Toileting 3   Upper Body Dressing 4   Grooming 4   Eating 4   Daily Activity Raw Score 21   Daily Activity Standardized Score (Calc for Raw Score >=11) 44.27   AM-PAC Applied Cognition Inpatient   Following a Speech/Presentation 4   Understanding Ordinary Conversation 4   Taking Medications 4   Remembering Where Things Are Placed or Put Away 4   Remembering List of 4-5 Errands 4   Taking Care of Complicated Tasks 4   Applied Cognition Raw Score 24   Applied Cognition Standardized Score 62.21     OT goals to be addressed in the next 14 days:    Pt will increase activity tolerance to G for 30 min txment sessions    Pt will complete UB/LB self care w/ mod I using adaptive device and DME as needed    Pt will complete toileting w/ mod I w/ G hygiene/thoroughness using DME as needed    Pt will improve functional transfers to Mod I on/off all surfaces using DME as needed w/ G balance/safety     Pt will improve functional mobility during ADL/IADL/leisure tasks to Mod I using DME as needed w/ G balance/safety     Pt will participate in simulated IADL management task with DME as needed to increase independence to  w/ G safety and endurance    Pt will demonstrate 100% carryover of energy conservation techniques t/o functional I/ADL/leisure tasks w/o cues s/p skilled education    Pt will independently identify and utilize 2-3 coping strategies to increase positive affect and promote overall well-being.

## 2023-07-31 NOTE — DISCHARGE INSTR - OTHER ORDERS
Incisional care:   Wash incision daily w/ soap and water. Pat dry thoroughly.    Place clean, dry gauze to cover groin incision to keep area clean and dry and to prevent skin- skin contact

## 2023-07-31 NOTE — PLAN OF CARE
Problem: PHYSICAL THERAPY ADULT  Goal: Performs mobility at highest level of function for planned discharge setting. See evaluation for individualized goals. Description: Treatment/Interventions: Functional transfer training, LE strengthening/ROM, Therapeutic exercise, Endurance training, Patient/family training, Equipment eval/education, Bed mobility, Gait training, OT, Spoke to nursing  Equipment Recommended: Yvonne Meraz       See flowsheet documentation for full assessment, interventions and recommendations. Note: Prognosis: Good  Problem List: Decreased strength, Decreased endurance, Impaired balance, Decreased mobility, Pain, Decreased skin integrity  Assessment: PT completed evaluation of 46 y.o. male admitted to [facility] on 7/28/2023 with symptoms of: R LE pain x 2 weeks. Patient with acute occlusion of the mid and distal superficial femoral artery and distal anteriortibial artery. He is s/p SFA cutdown, balloon angioplasty and stenting, and right leg fasciotomy on 7/29. No formal WBS noted. Patient maintained R LE NWB. Patient's current status instabilities include ongoing pain, continuous O2/HR monitoring, falls risk, and a regression in function from baseline. PMH is significant for HTN, HLD, and tobacco abuse. Prior to this admission patient resided with spouse (reports she has MS and is home during day) in a one level home (3 JEFF). At his baseline he is I with mobility (no use of AD), ADLs, and iADLS. + . FT employment. Patient has been using crutches to ambulate x 2 weeks in setting of R LE pain. Patient presents at time of PT evaluation functioning below baseline and currently w/ overall mobility deficits 2* to: impaired balance, gait deviations, decreased activity tolerance and fall risk.  During PT evaluation, patient currently is requiring min-AX1 for bed mobility (to manage R LE EOB) and min-AX1 for stand pivot transfer (patient declined use of AD and reached for chair; maintained R LE NWB due to pain). Anticipate with improvement in pain patient will achieve PT goals and d/c home w/o need for HHPT. May require RW- will continue to assess. Patient will continue to benefit from continued skilled PT this admission to achieve maximal function and safety. PT Discharge Recommendation: No rehabilitation needs    See flowsheet documentation for full assessment. Problem: PHYSICAL THERAPY ADULT  Goal: Performs mobility at highest level of function for planned discharge setting. See evaluation for individualized goals. Description: Treatment/Interventions: Functional transfer training, LE strengthening/ROM, Therapeutic exercise, Endurance training, Patient/family training, Equipment eval/education, Bed mobility, Gait training, OT, Spoke to nursing  Equipment Recommended: Joceline Balbuena       See flowsheet documentation for full assessment, interventions and recommendations. Note: Prognosis: Good  Problem List: Decreased strength, Decreased endurance, Impaired balance, Decreased mobility, Pain, Decreased skin integrity  Assessment: PT completed evaluation of 46 y.o. male admitted to [address] [place] [address] on 7/28/2023 with symptoms of: R LE pain x 2 weeks. Patient with acute occlusion of the mid and distal superficial femoral artery and distal anteriortibial artery. He is s/p SFA cutdown, balloon angioplasty and stenting, and right leg fasciotomy on 7/29. No formal WBS noted. Patient maintained R LE NWB. Patient's current status instabilities include ongoing pain, continuous O2/HR monitoring, falls risk, and a regression in function from baseline. PMH is significant for HTN, HLD, and tobacco abuse. Prior to this admission patient resided with spouse (reports she has MS and is home during day) in a one level home (3 JEFF). At his baseline he is I with mobility (no use of AD), ADLs, and iADLS. + . FT employment. Patient has been using crutches to ambulate x 2 weeks in setting of R LE pain. Patient presents at time of PT evaluation functioning below baseline and currently w/ overall mobility deficits 2* to: impaired balance, gait deviations, decreased activity tolerance and fall risk. During PT evaluation, patient currently is requiring min-AX1 for bed mobility (to manage R LE EOB) and min-AX1 for stand pivot transfer (patient declined use of AD and reached for chair; maintained R LE NWB due to pain). Anticipate with improvement in pain patient will achieve PT goals and d/c home w/o need for HHPT. May require RW- will continue to assess. Patient will continue to benefit from continued skilled PT this admission to achieve maximal function and safety. PT Discharge Recommendation: No rehabilitation needs    See flowsheet documentation for full assessment.

## 2023-07-31 NOTE — PROGRESS NOTES
[facility]  Progress Note  Name: Landon Viadl  MRN: [id number]  [facility] Date of Admission: 7/28/2023   Date of Service: 7/31/2023 I Hospital Day: 3    Assessment/Plan   * Arterial occlusion  Assessment & Plan  Patient with 2 weeks of right lower extremity pain consistent with claudication  Lower extremity Dopplers notable for an acute occlusion of the mid and distal superficial  femoral artery. There is an occlusion of the distal anterior tibial artery  She has been transferred to [facility] for vascular evaluation  Continue on heparin drip, aspirin  Status post right SFA cutdown, balloon angioplasty and stenting, right leg fasciotomy-postoperative day 2. Multi-Podus boots on  Surgical wound covered in dressing-dressing changed by vascular surgery today. Patient noted that they make close the fasciotomy tomorrow    Acute drop in hemoglobin noted , ABLA noted secondary to the expected post operative blood loss   Hyperlipidemia, mixed  Assessment & Plan  Transaminitis improving  Patient need to start on statin at the time of discharge    Leukocytosis (leucocytosis)  Assessment & Plan  · White count is up to 17,000. Thrombocytosis also noted  · Likely reactive. Monitor  · No fever   · Chest CT reviewed-patient did not have any pulmonary symptoms with shortness of breath or cough or sputum production. Obtain procalcitonin. Monitor off of antibiotic    Essential hypertension  Assessment & Plan  Monitor bp while inpatient  Continue amlodipine 5mg daily    Tobacco dependence  Assessment & Plan  Lifestyle changes dsicussed  Nicotine patch while inpatient                VTE Pharmacologic Prophylaxis:   Moderate Risk (Score 3-4) - Pharmacological DVT Prophylaxis Ordered: heparin drip. Patient Centered Rounds: I performed bedside rounds with nursing staff today.   Discussions with Specialists or Other Care Team Provider:    Education and Discussions with Family / Patient: Family in the room  Total Time Spent on Date of Encounter in care of patient: 35 minutes This time was spent on one or more of the following: performing physical exam; counseling and coordination of care; obtaining or reviewing history; documenting in the medical record; reviewing/ordering tests, medications or procedures; communicating with other healthcare professionals and discussing with patient's family/caregivers. Current Length of Stay: 3 day(s)  Current Patient Status: Inpatient   Certification Statement: The patient will continue to require additional inpatient hospital stay due to OR tomorrow  Discharge Plan:     Code Status: Level 1 - Full Code    Subjective:   Patient seen and examined. No events overnight. Vascular surgery change the dressing today. Patient reported that his pain is controlled on the current pain regimen. Vascular surgery is planning for potential closure of the fasciotomy tomorrow. Patient denies any shortness of breath cough or phlegm production. Remained afebrile. We will monitor off of antibiotics    Objective:     Vitals:   Temp (24hrs), Av.2 °F (36.8 °C), Min:98 °F (36.7 °C), Max:98.3 °F (36.8 °C)    Temp:  [98 °F (36.7 °C)-98.3 °F (36.8 °C)] 98.3 °F (36.8 °C)  HR:  [70-96] 81  Resp:  [17-20] 17  BP: (126-169)/(58-80) 131/60  SpO2:  [96 %-100 %] 100 %  Body mass index is 23.08 kg/m². Input and Output Summary (last 24 hours): Intake/Output Summary (Last 24 hours) at 2023 1716  Last data filed at 2023 1200  Gross per 24 hour   Intake 1127.97 ml   Output 1800 ml   Net -672.03 ml       Physical Exam:   Physical Exam  Constitutional:       General: He is not in acute distress. HENT:      Head: Normocephalic. Nose: Nose normal.   Eyes:      General: No scleral icterus. Cardiovascular:      Rate and Rhythm: Normal rate and regular rhythm. Heart sounds: Normal heart sounds. No murmur heard.   Pulmonary:      Effort: Pulmonary effort is normal.      Breath sounds: Normal breath sounds. Abdominal:      General: Abdomen is flat. There is no distension. Tenderness: There is no abdominal tenderness. Musculoskeletal:      Comments: Lower extremity surgical site covered in dressing. Multi-Podus boot present   Skin:     General: Skin is warm. Coloration: Skin is not jaundiced. Neurological:      Mental Status: He is alert. Mental status is at baseline. Additional Data:     Labs:  Results from last 7 days   Lab Units 07/31/23  0741   WBC Thousand/uL 17.67*   HEMOGLOBIN g/dL 12.7   HEMATOCRIT % 37.3   PLATELETS Thousands/uL 499*   NEUTROS PCT % 63   LYMPHS PCT % 29   MONOS PCT % 8   EOS PCT % 0     Results from last 7 days   Lab Units 07/31/23  0741   SODIUM mmol/L 136   POTASSIUM mmol/L 3.5   CHLORIDE mmol/L 108   CO2 mmol/L 27   BUN mg/dL 11   CREATININE mg/dL 0.64   ANION GAP mmol/L 1   CALCIUM mg/dL 8.5   ALBUMIN g/dL 3.1*   TOTAL BILIRUBIN mg/dL 0.66   ALK PHOS U/L 73   ALT U/L 57   AST U/L 38   GLUCOSE RANDOM mg/dL 112     Results from last 7 days   Lab Units 07/29/23  1042   INR  1.03                   Lines/Drains:  Invasive Devices     Peripheral Intravenous Line  Duration           Peripheral IV 07/28/23 Dorsal (posterior); Right Hand 3 days    Peripheral IV 07/28/23 Right Antecubital 3 days    Peripheral IV 07/29/23 Left Arm 2 days                      Imaging: Recent Cultures (last 7 days): CTA chest abdomen and pelvis reviewed        Last 24 Hours Medication List:   Current Facility-Administered Medications   Medication Dose Route Frequency Provider Last Rate   • amLODIPine  5 mg Oral Daily Luis Felipe Ramachandran DO     • aspirin  81 mg Oral Daily Luis Felipe Williamson DO     • clopidogrel  75 mg Oral Daily Luis Felipe Williamson DO     • gabapentin  300 mg Oral TID Danis Potter MD     • heparin (porcine)  3-20 Units/kg/hr (Order-Specific) Intravenous Titrated Luis Felipe Williamson DO 21 Units/kg/hr (07/31/23 3571)   • hydrALAZINE  10 mg Intravenous Q6H PRN Irma [place], DO     • HYDROmorphone  0.5 mg Intravenous Q3H PRN Luis Felipe Williamson, DO     • labetalol  5 mg Intravenous Q15 Min PRN Irma [place], DO     • nicotine  1 patch Transdermal Daily Luis Felipe Williamson, DO     • oxyCODONE  2.5 mg Oral Q4H PRN Irma [place], DO      Or   • oxyCODONE  5 mg Oral Q4H PRN Irma [place], DO     • senna  1 tablet Oral HS Irma [place], DO          Today, Patient Was Seen By: Amanda Guerin MD    **Please Note: This note may have been constructed using a voice recognition system. **

## 2023-07-31 NOTE — PHYSICAL THERAPY NOTE
Physical Therapy Evaluation     Patient's Name: Celeste Soto    Admitting Diagnosis  acute arterial occlusion RLE    Problem List  Patient Active Problem List   Diagnosis    Tobacco dependence    Essential hypertension    Leukocytosis (leucocytosis)    Annual physical exam    Prediabetes    Hyperlipidemia, mixed    Pain in right leg    Arterial occlusion       Past Medical History  Past Medical History:   Diagnosis Date    Hypertension     Lung nodule     Prediabetes        Past Surgical History  Past Surgical History:   Procedure Laterality Date    IR LOWER EXTREMITY ANGIOGRAM  7/29/2023    THROMBECTOMY W/ EMBOLECTOMY Right 7/29/2023    Procedure: RIGHT LEG BALLOON ANGIOPLASTY, STENT, FASCIOTOMY;  Surgeon: Dominique Lopez MD;  Location: [facility];  Service: Vascular        07/31/23 1031   PT Last Visit   PT Visit Date 07/31/23   Note Type   Note type Evaluation   Pain Assessment   Pain Assessment Tool 0-10   Pain Score 5   Pain Location/Orientation Orientation: Right;Location: Leg   Hospital Pain Intervention(s) Ambulation/increased activity;Repositioned   Restrictions/Precautions   Weight Bearing Precautions Per Order No   Braces or Orthoses   (none)   Other Precautions Pain; Fall Risk;Multiple lines;Telemetry   Home Living   Type of [address] [place] [address] One level;Stairs to enter with rails  (3 jeff)   Bathroom Shower/Tub Walk-in shower   [address] chair   [address]   Additional Comments Prior to this admission patient resided with spouse (reports she has MS and is home during day) in a one level home (3 JEFF). At his baseline he is I with mobility (no use of AD), ADLs, and iADLS. + . FT employment. Patient has been using crutches to ambulate x 2 weeks in setting of R LE pain. Prior Function   Level of [place] Independent with ADLs; Independent with functional mobility; Independent with IADLS   Lives With Spouse   IADLs Independent with driving; Independent with meal prep; Independent with medication management   Falls in the last 6 months 0   Vocational Full time employment   General   Additional Pertinent History 46 y.o. male admitted to [address] [place] [address] on 7/28/2023 with symptoms of: R LE pain x 2 weeks. Patient with acute occlusion of the mid and distal superficial femoral artery and distal anteriortibial artery. He is s/p SFA cutdown, balloon angioplasty and stenting, and right leg fasciotomy on 7/29. No formal WBS noted. Patient maintained R LE NWB. Family/Caregiver Present No   Cognition   Overall Cognitive Status WFL   Arousal/Participation Alert   Orientation Level Oriented X4   Memory Within functional limits   Following Commands Follows one step commands without difficulty   Comments patient admits he is irritable due to pain; not very receptive to education   Subjective   Subjective "I have a high pain tolerance but this is too much"   RUE Assessment   RUE Assessment WFL   LUE Assessment   LUE Assessment WFL   RLE Assessment   RLE Assessment X  (not assessed in setting of pain)   LLE Assessment   LLE Assessment WFL   Bed Mobility   Supine to Sit 4  Minimal assistance   Additional items Assist x 1; Increased time required;LE management   Sit to Supine Unable to assess   Additional Comments post evaluation patient OOB in chair with R LE elevated on pillow   Transfers   Sit to Stand 4  Minimal assistance   Additional items Assist x 1; Increased time required;Verbal cues   Stand to Sit 4  Minimal assistance   Additional items Assist x 1; Increased time required;Verbal cues   Stand pivot 4  Minimal assistance   Additional items Assist x 1; Increased time required;Verbal cues   Additional Comments patient denied to use AD; reached for chair and performd stand pivot transfer   Balance   Static Sitting Good   Static Standing Fair -   Endurance Deficit   Endurance Deficit Yes   Endurance Deficit Description pain   Activity Tolerance   Activity Tolerance Patient limited by pain   Medical Staff Made Aware This high complexity evaluation was performed with an occupational therapist due to the patient's co-morbidities, clinically unstable presentation, and present impairments which are a regression from the patient's baseline. Nurse Made Aware radha to see per RN Rimi   Assessment   Prognosis Good   Problem List Decreased strength;Decreased endurance; Impaired balance;Decreased mobility;Pain;Decreased skin integrity   Assessment PT completed evaluation of 46 y.o. male admitted to [facility] on 7/28/2023 with symptoms of: R LE pain x 2 weeks. Patient with acute occlusion of the mid and distal superficial femoral artery and distal anteriortibial artery. He is s/p SFA cutdown, balloon angioplasty and stenting, and right leg fasciotomy on 7/29. No formal WBS noted. Patient maintained R LE NWB. Patient's current status instabilities include ongoing pain, continuous O2/HR monitoring, falls risk, and a regression in function from baseline. PMH is significant for HTN, HLD, and tobacco abuse. Prior to this admission patient resided with spouse (reports she has MS and is home during day) in a one level home (3 JEFF). At his baseline he is I with mobility (no use of AD), ADLs, and iADLS. + . FT employment. Patient has been using crutches to ambulate x 2 weeks in setting of R LE pain. Patient presents at time of PT evaluation functioning below baseline and currently w/ overall mobility deficits 2* to: impaired balance, gait deviations, decreased activity tolerance and fall risk. During PT evaluation, patient currently is requiring min-AX1 for bed mobility (to manage R LE EOB) and min-AX1 for stand pivot transfer (patient declined use of AD and reached for chair; maintained R LE NWB due to pain). Anticipate with improvement in pain patient will achieve PT goals and d/c home w/o need for HHPT. May require RW- will continue to assess.  Patient will continue to benefit from continued skilled PT this admission to achieve maximal function and safety. Goals   Patient Goals to have less pain   LTG Expiration Date 08/14/23   Long Term Goal #1 1) Perform bed mobility mod-I to participate in frequent repositioning and improve skin integrity; 2) Perform functional transfers mod-I to promote I with toileting and OOB mobility; 3) Ambulate 200 feet mod-I with least restrictive device to participate in household and community level mobility; 4) Navigate 3 steps S level in order to safely navigate multiple floors at home   PT Treatment Day 0   Plan   Treatment/Interventions Functional transfer training;LE strengthening/ROM; Therapeutic exercise; Endurance training;Patient/family training;Equipment eval/education; Bed mobility;Gait training;OT;Spoke to nursing   PT Frequency 3-5x/wk   Recommendation   PT Discharge Recommendation No rehabilitation needs   Equipment Recommended [facility] Recommended Wheeled walker   Change/add to [employer]? No   AM-PAC Basic Mobility Inpatient   Turning in Flat Bed Without Bedrails 3   Lying on Back to Sitting on Edge of Flat Bed Without Bedrails 3   Moving Bed to Chair 3   Standing Up From Chair Using Arms 3   Walk in Room 2   Climb 3-5 Stairs With Railing 2   Basic Mobility Inpatient Raw Score 16   Basic Mobility Standardized Score 38.32   Highest Level Of Mobility   JH-HLM Goal 5: Stand one or more mins   JH-HLM Achieved 4: Move to chair/commode     The patient's AM-PAC Basic Mobility Inpatient Standardized Score is less than 42.9, suggesting this patient may benefit from discharge to post-acute rehabilitation services. Please also refer to the recommendation of the Physical Therapist for safe discharge planning.       Marco Sanchez, PT, DPT

## 2023-07-31 NOTE — PLAN OF CARE
Problem: OCCUPATIONAL THERAPY ADULT  Goal: Performs self-care activities at highest level of function for planned discharge setting. See evaluation for individualized goals. Description: Treatment Interventions: ADL retraining, Functional transfer training, UE strengthening/ROM, Endurance training, Equipment evaluation/education, Patient/family training, Compensatory technique education, Continued evaluation, Energy conservation, Activityengagement          See flowsheet documentation for full assessment, interventions and recommendations. Note: Limitation: Decreased ADL status, Decreased UE strength, Decreased Safe judgement during ADL, Decreased endurance, Decreased self-care trans, Decreased high-level ADLs  Prognosis: Good  Assessment: Pt is a 46 y.o. male seen for OT evaluation s/p admit to [facility] on 7/28/2023 w/ Arterial occlusion. Pt is now status post right SFA cutdown, balloon angioplasty and stenting, right leg fasciotomy, angiogram. There is no WBS in the orders per vascular however treated as NWB due to pain and open fasciotomy. Comorbidities affecting pt's functional performance at time of assessment include: Hypertension, Lung nodule, and Prediabetes. Personal factors affecting pt at time of IE include:steps to enter environment, limited home support, difficulty performing ADLS, difficulty performing IADLS , limited insight into deficits, decreased initiation and engagement  and health management . Prior to admission, pt was I with all ADLS and IADLs. Upon evaluation: Pt presents supine and is agreeable to OTIE, all vitals WNLs. Pt requires overall Min A 2* the following deficits impacting occupational performance: weakness, decreased strength, decreased balance, decreased tolerance, decreased safety awareness, increased pain and orthopedic restrictions. Pt resting in chair at end of session with all needs in reach, alarm on, all lines in place and SCD's on.  Pt to benefit from continued skilled OT tx while in the hospital to address deficits as defined above and maximize level of functional independence w ADL's and functional mobility. Occupational Performance areas to address include: grooming, bathing/shower, toilet hygiene, dressing, health maintenance, functional mobility, community mobility and clothing management. The patient's raw score on the -PAC Daily Activity inpatient short form is 21  , standardized score is 44.27  , greater than 39.4. Patients at this level are likely to benefit from discharge to home. Please refer to the recommendation of the Occupational Therapist for safe discharge planning.      OT Discharge Recommendation: No rehabilitation needs

## 2023-07-31 NOTE — PROGRESS NOTES
Progress Note - Vascular Surgery     Assessment/Plan:  46 y.o. male who is status post right SFA cutdown, balloon angioplasty and stenting, right leg fasciotomy, angiogram, improved motor deficits. · RLE dressing changed this visit. · C/w R multipodus boot  · C/w heparin drip  · C/w NV checks  · C/w Pain control  · CTA Chest abd    Subjective:  Patient seen bedside. Pt able to move R ankle against minimal resistance. Pt reports improvement of sensation at R foot, proprioception intact. Pt reports RLE pain. Vitals:  /58 (BP Location: Left arm)   Pulse 96   Temp 98.3 °F (36.8 °C) (Oral)   Resp 18   Ht 5' 9" (1.753 m)   Wt 70.9 kg (156 lb 4.8 oz)   SpO2 96%   BMI 23.08 kg/m²     I/Os:  I/O last 3 completed shifts: In: 974.4 [P.O.:540; I.V.:434.4]  Out: 5283 [Urine:4325]  No intake/output data recorded.     Lab Results and Cultures:   Lab Results   Component Value Date    WBC 17.67 (H) 07/31/2023    HGB 12.7 07/31/2023    HCT 37.3 07/31/2023    MCV 82 07/31/2023     (H) 07/31/2023     Lab Results   Component Value Date    CALCIUM 8.5 07/30/2023    K 3.9 07/30/2023    CO2 23 07/30/2023     07/30/2023    BUN 12 07/30/2023    CREATININE 0.78 07/30/2023     Lab Results   Component Value Date    INR 1.03 07/29/2023    INR 1.08 07/28/2023    INR 1.08 10/01/2018    PROTIME 13.7 07/29/2023    PROTIME 13.8 07/28/2023    PROTIME 13.9 10/01/2018          Medications:  Current Facility-Administered Medications   Medication Dose Route Frequency   • amLODIPine (NORVASC) tablet 5 mg  5 mg Oral Daily   • aspirin chewable tablet 81 mg  81 mg Oral Daily   • clopidogrel (PLAVIX) tablet 75 mg  75 mg Oral Daily   • gabapentin (NEURONTIN) capsule 300 mg  300 mg Oral TID   • heparin (porcine) 25,000 units in 0.45% NaCl 250 mL infusion (premix)  3-20 Units/kg/hr (Order-Specific) Intravenous Titrated   • hydrALAZINE (APRESOLINE) injection 10 mg  10 mg Intravenous Q6H PRN   • HYDROmorphone (DILAUDID) injection 0.5 mg  0.5 mg Intravenous Q3H PRN   • labetalol (NORMODYNE) injection 5 mg  5 mg Intravenous Q15 Min PRN   • nicotine (NICODERM CQ) 14 mg/24hr TD 24 hr patch 1 patch  1 patch Transdermal Daily   • oxyCODONE (ROXICODONE) split tablet 2.5 mg  2.5 mg Oral Q4H PRN    Or   • oxyCODONE (ROXICODONE) IR tablet 5 mg  5 mg Oral Q4H PRN   • senna (SENOKOT) tablet 8.6 mg  1 tablet Oral HS         Physical Exam:    General appearance: alert and oriented, in no acute distress  Skin: warm to touch  Neurologic: RLE improved dorsiflexion/plantarflexion  Head: Normocephalic, without obvious abnormality, atraumatic  Eyes: EOMI  Lungs: No resp distress  Heart: No edema  Abdomen: soft, NT  Extremities: warm to touch, able to dorsi/plantarflex right foot, limited ROM 2/2 guarding    Wound/Incision:  Fasciotomy dressing c/d/i  Cutdown site with skin glue intact    Pulse exam:  Femoral: Right: 2+ Left: 2+  DP: Right: doppler signal Left: 1+  PT: Right: doppler signal Left: 1+    Melda Narrow An, DPM  7/31/2023

## 2023-07-31 NOTE — ASSESSMENT & PLAN NOTE
· Patient had a CTA chest , abdomen and pelvis today  · CT read as multiple right upper lobe opacity concerning for infectious process likely atypical infection. Patient denies any shortness of breath cough or any respiratory symptoms. Leukocytosis noted but remained afebrile  · Given lack of pulmonary symptoms to monitor off of antibiotics.     · Procalcitonin negative

## 2023-07-31 NOTE — ASSESSMENT & PLAN NOTE
Patient with 2 weeks of right lower extremity pain consistent with claudication  Lower extremity Dopplers notable for an acute occlusion of the mid and distal superficial  femoral artery. There is an occlusion of the distal anterior tibial artery  She has been transferred to [facility] for vascular evaluation  Continue on heparin drip, aspirin  Status post right SFA cutdown, balloon angioplasty and stenting, right leg fasciotomy-postoperative day 2. Multi-Podus boots on  Surgical wound covered in dressing-dressing changed by vascular surgery today.   Patient noted that they make close the fasciotomy tomorrow

## 2023-07-31 NOTE — CASE MANAGEMENT
Case Management Assessment    Patient name Celeste Soto  Location [address] 503/[facility] 334-43 MRN [id number]  : 1970 Date 2023       Current Admission Date: 2023  Current Admission Diagnosis:Arterial occlusion   Patient Active Problem List    Diagnosis Date Noted   • Arterial occlusion 2023   • Pain in right leg 2023   • Hyperlipidemia, mixed 2019   • Prediabetes 2018   • Tobacco dependence 10/03/2018   • Essential hypertension 10/03/2018   • Leukocytosis (leucocytosis) 10/03/2018   • Annual physical exam 10/03/2018      LOS (days): 3  Geometric Mean LOS (GMLOS) (days):   Days to GMLOS:     OBJECTIVE:    Risk of Unplanned Readmission Score: 7.23         Current admission status: Inpatient     Preferred Pharmacy:   [address] [place] [address] [address] [place] [address]  Phone: [phone] Fax: [phone]    Primary Care Provider: Quinn Yusuf DO    Primary Insurance: [employer]  Secondary Insurance:     ASSESSMENT:  [place] Proxies    There are no active Health Care Proxies on file. Advance Directives  Does patient have a [address]?: No    Readmission Root Cause  30 Day Readmission: No    Patient Information  Admitted from[de-identified] Home  Mental Status: Alert  During Assessment patient was accompanied by: Spouse  Assessment information provided by[de-identified] Patient  Primary Caregiver: Self  Support Systems: Spouse/significant other  [place] of Residence: [address] [place] [address] do you live in?: [facility] entry access options.  Select all that apply.: Stairs  Number of steps to enter home.: 3  Do the steps have railings?: Yes  Type of Current Residence: [place]  In the last 12 months, was there a time when you were not able to pay the mortgage or rent on time?: No  In the last 12 months, how many places have you lived?: 1  In the last 12 months, was there a time when you did not have a steady place to sleep or slept in a shelter (including now)?: No  Homeless/housing insecurity resource given?: N/A  Living Arrangements: Lives w/ Spouse/significant other  Is patient a ?: No    Activities of Daily Living Prior to Admission  Functional Status: Independent  Completes ADLs independently?: Yes  Ambulates independently?: Yes  Does patient use assisted devices?: Yes  Assisted Devices (DME) used: Crutches  Does patient currently own DME?: Yes  What DME does the patient currently own?: Loretta Born  Does patient have a history of Outpatient Therapy (PT/OT)?: No  Does the patient have a history of Short-Term Rehab?: No  Does patient have a history of HHC?: No  Does patient currently have [facility]?: No    Patient Information Continued  Income Source: Employed  Does patient have prescription coverage?: Yes  Within the past 12 months, you worried that your food would run out before you got the money to buy more.: Never true  Within the past 12 months, the food you bought just didn't last and you didn't have money to get more.: Never true  Food insecurity resource given?: N/A  Does patient receive dialysis treatments?: No  Does patient have a history of substance abuse?: No  Does patient have a history of Mental Health Diagnosis?: No    Means of Transportation  Means of Transport to Appts[de-identified] Drives Self  In the past 12 months, has lack of transportation kept you from medical appointments or from getting medications?: No  In the past 12 months, has lack of transportation kept you from meetings, work, or from getting things needed for daily living?: No  Was application for public transport provided?: N/A        Summary: Met with patient and patient's spouse at the bedside to complete initial assessment. Patient was independent prior to admission. Driving and working. Patient agreeable to Home with VNA for nursing if needed. Patient requesting a 3 in 1 commode on DC.      CM reviewed d/c planning process including the following: identifying help at home, patient preference for d/c planning needs,[facility] Meds to Bed program, availability of treatment team to discuss questions or concerns patient and/or family may have regarding understanding medications and recognizing signs and symptoms once discharged. CM also encouraged patient to follow up with all recommended appointments after discharge. Patient advised of importance for patient and family to participate in managing patient’s medical well being.

## 2023-07-31 NOTE — ASSESSMENT & PLAN NOTE
· White count is up to 17,000. Thrombocytosis also noted  · Likely reactive. Monitor  · No fever   · Chest CT reviewed-patient did not have any pulmonary symptoms with shortness of breath or cough or sputum production. Obtain procalcitonin.   Monitor off of antibiotic

## 2023-07-31 NOTE — INCIDENTAL FINDINGS
The following findings require follow up:  Radiographic finding   Finding:     · 7mm pancreatic tail lesion. ·  R kidney 2.7cm cystic lesion.    · R adrenal nodule 1.5cm       Follow up required: PCP   Follow up should be done within 1-4 weeks of discharge from hospitalization      Please notify the following clinician to assist with the follow up:   Dr. Rae Cohen-Luis Miguel  7/31/2023

## 2023-07-31 NOTE — CASE MANAGEMENT
Case Management Progress Note    Patient name Ericka Stinson  Location [address] 503/[facility] 759-30 MRN [id number]  : 1970 Date 2023       LOS (days): 3  Geometric Mean LOS (GMLOS) (days):   Days to GMLOS:        OBJECTIVE:        Current admission status: Inpatient  Preferred Pharmacy:   [facility] [address] [facility] [address] [place] [address]  Phone: [phone] Fax: [phone]    Primary Care Provider: Jose Francisco Narvaez DO    Primary Insurance: [employer]  Secondary Insurance:     PROGRESS NOTE: Provider requested price check on Xarelto. Spoke with Suzette Pulliam at [email or web address] [phone] who states that Xarelto starter pack will be almost $900 and 20 mg will be $517.25. Pharmacy does not have it in stock and most likely will not get it in time for discharge.  Provider notified.

## 2023-08-01 LAB
ANION GAP SERPL CALCULATED.3IONS-SCNC: 3 MMOL/L
APTT PPP: 63 SECONDS (ref 23–37)
BUN SERPL-MCNC: 10 MG/DL (ref 5–25)
CALCIUM SERPL-MCNC: 8.7 MG/DL (ref 8.3–10.1)
CHLORIDE SERPL-SCNC: 105 MMOL/L (ref 96–108)
CO2 SERPL-SCNC: 26 MMOL/L (ref 21–32)
CREAT SERPL-MCNC: 0.63 MG/DL (ref 0.6–1.3)
ERYTHROCYTE [DISTWIDTH] IN BLOOD BY AUTOMATED COUNT: 13.2 % (ref 11.6–15.1)
GFR SERPL CREATININE-BSD FRML MDRD: 111 ML/MIN/1.73SQ M
GLUCOSE SERPL-MCNC: 112 MG/DL (ref 65–140)
HCT VFR BLD AUTO: 36.5 % (ref 36.5–49.3)
HGB BLD-MCNC: 12.8 G/DL (ref 12–17)
MCH RBC QN AUTO: 28.3 PG (ref 26.8–34.3)
MCHC RBC AUTO-ENTMCNC: 35.1 G/DL (ref 31.4–37.4)
MCV RBC AUTO: 81 FL (ref 82–98)
PLATELET # BLD AUTO: 541 THOUSANDS/UL (ref 149–390)
PMV BLD AUTO: 8.4 FL (ref 8.9–12.7)
POTASSIUM SERPL-SCNC: 3.8 MMOL/L (ref 3.5–5.3)
PROCALCITONIN SERPL-MCNC: 0.05 NG/ML
RBC # BLD AUTO: 4.52 MILLION/UL (ref 3.88–5.62)
SODIUM SERPL-SCNC: 134 MMOL/L (ref 135–147)
WBC # BLD AUTO: 14.05 THOUSAND/UL (ref 4.31–10.16)

## 2023-08-01 PROCEDURE — 85730 THROMBOPLASTIN TIME PARTIAL: CPT | Performed by: SURGERY

## 2023-08-01 PROCEDURE — 85027 COMPLETE CBC AUTOMATED: CPT | Performed by: PHYSICIAN ASSISTANT

## 2023-08-01 PROCEDURE — 99232 SBSQ HOSP IP/OBS MODERATE 35: CPT | Performed by: INTERNAL MEDICINE

## 2023-08-01 PROCEDURE — 80048 BASIC METABOLIC PNL TOTAL CA: CPT | Performed by: PHYSICIAN ASSISTANT

## 2023-08-01 PROCEDURE — 84145 PROCALCITONIN (PCT): CPT | Performed by: FAMILY MEDICINE

## 2023-08-01 PROCEDURE — 99024 POSTOP FOLLOW-UP VISIT: CPT | Performed by: SURGERY

## 2023-08-01 PROCEDURE — NC001 PR NO CHARGE: Performed by: SURGERY

## 2023-08-01 RX ORDER — HYDROMORPHONE HCL/PF 1 MG/ML
0.5 SYRINGE (ML) INJECTION
Status: DISCONTINUED | OUTPATIENT
Start: 2023-08-01 | End: 2023-08-05 | Stop reason: HOSPADM

## 2023-08-01 RX ORDER — OXYCODONE HYDROCHLORIDE 5 MG/1
5 TABLET ORAL EVERY 4 HOURS PRN
Status: DISCONTINUED | OUTPATIENT
Start: 2023-08-01 | End: 2023-08-05 | Stop reason: HOSPADM

## 2023-08-01 RX ORDER — METHOCARBAMOL 500 MG/1
500 TABLET, FILM COATED ORAL EVERY 6 HOURS SCHEDULED
Status: DISCONTINUED | OUTPATIENT
Start: 2023-08-01 | End: 2023-08-05 | Stop reason: HOSPADM

## 2023-08-01 RX ORDER — OXYCODONE HYDROCHLORIDE 10 MG/1
10 TABLET ORAL EVERY 4 HOURS PRN
Status: DISCONTINUED | OUTPATIENT
Start: 2023-08-01 | End: 2023-08-05 | Stop reason: HOSPADM

## 2023-08-01 RX ORDER — ACETAMINOPHEN 325 MG/1
975 TABLET ORAL EVERY 8 HOURS SCHEDULED
Status: DISCONTINUED | OUTPATIENT
Start: 2023-08-01 | End: 2023-08-05 | Stop reason: HOSPADM

## 2023-08-01 RX ADMIN — HEPARIN SODIUM 21 UNITS/KG/HR: 10000 INJECTION, SOLUTION INTRAVENOUS at 01:15

## 2023-08-01 RX ADMIN — METHOCARBAMOL 500 MG: 500 TABLET ORAL at 23:26

## 2023-08-01 RX ADMIN — ACETAMINOPHEN 650 MG: 325 TABLET, FILM COATED ORAL at 15:27

## 2023-08-01 RX ADMIN — AMLODIPINE BESYLATE 5 MG: 5 TABLET ORAL at 08:42

## 2023-08-01 RX ADMIN — HYDROMORPHONE HYDROCHLORIDE 0.5 MG: 1 INJECTION, SOLUTION INTRAMUSCULAR; INTRAVENOUS; SUBCUTANEOUS at 09:57

## 2023-08-01 RX ADMIN — NICOTINE 1 PATCH: 14 PATCH, EXTENDED RELEASE TRANSDERMAL at 08:42

## 2023-08-01 RX ADMIN — SENNOSIDES 8.6 MG: 8.6 TABLET, FILM COATED ORAL at 21:04

## 2023-08-01 RX ADMIN — ASPIRIN 81 MG CHEWABLE TABLET 81 MG: 81 TABLET CHEWABLE at 08:42

## 2023-08-01 RX ADMIN — ACETAMINOPHEN 975 MG: 325 TABLET, FILM COATED ORAL at 21:04

## 2023-08-01 RX ADMIN — CLOPIDOGREL BISULFATE 75 MG: 75 TABLET ORAL at 08:42

## 2023-08-01 RX ADMIN — GABAPENTIN 300 MG: 300 CAPSULE ORAL at 21:04

## 2023-08-01 RX ADMIN — HEPARIN SODIUM 21 UNITS/KG/HR: 10000 INJECTION, SOLUTION INTRAVENOUS at 18:59

## 2023-08-01 RX ADMIN — OXYCODONE HYDROCHLORIDE 5 MG: 5 TABLET ORAL at 08:41

## 2023-08-01 RX ADMIN — GABAPENTIN 300 MG: 300 CAPSULE ORAL at 17:18

## 2023-08-01 RX ADMIN — METHOCARBAMOL 500 MG: 500 TABLET ORAL at 17:18

## 2023-08-01 RX ADMIN — GABAPENTIN 300 MG: 300 CAPSULE ORAL at 08:42

## 2023-08-01 NOTE — PROGRESS NOTES
Consult Note- Acute Pain Service   Uday Negron 46 y.o. male MRN: [id number]  Unit/Bed#: [facility] 503-01 Encounter: [id number]               Assessment/Plan     Assessment:   Patient Active Problem List   Diagnosis   • Abnormal chest CT   • Tobacco dependence   • Essential hypertension   • Leukocytosis (leucocytosis)   • Annual physical exam   • Prediabetes   • Hyperlipidemia, mixed   • Pain in right leg   • Arterial occlusion      Uday Negron is a 46 y.o. male with past medical history of tobacco use, hypertension, hyperlipidemia who presented on 7/28/2023 with right lower extremity pain with ambulation over the past several weeks. Patient was found to have acute arterial occlusions in the mid and distal superficial femoral artery as well as distal anterior tibial artery. Patient underwent right SFA cutdown, balloon angioplasty and stenting, right leg fasciotomy, angiogram on 7/29/2023 with vascular surgery and will require eventual fasciotomy closure. Acute pain service consulted for assistance in postoperative pain management. Patient seen and examined at bedside this afternoon, while sitting up in chair. Patient reports overall pain has been moderately well controlled however does endorse that he experiences severe breakthrough pain during dressing changes. Patient states he was premedicated with oxycodone 5 mg prior to his dressing change which was not sufficient in controlling his pain. He reports ongoing numbness to the LLE as well as significant muscle tightness in his calf and around his ankle. Denies any opioid induced side effects.     Plan:   · Will increase Oxycodone to 5 mg / 10 mg every 4 hours as needed, for moderate/severe pain  · Continue IV Dilaudid 0.5 mg every 3 hours, for breakthrough pain, this can also be given for dressing changes   -If IV Dilaudid is not sufficient in providing analgesia during dressing changes can consider increasing IV Dilaudid to 1 mg for dressing changes    Multimodal analgesia:  · Start Tylenol 975 mg every 8 hours scheduled  · Start Robaxin 500 mg every 6 hours scheduled, home medication  · Continue Gabapentin 300 mg 3 times daily  · Estimated Creatinine Clearance: 133.9 mL/min (by C-G formula based on SCr of 0.63 mg/dL). · Will hold off on any regional analgesia/nerve blocks at this time given ongoing neurovascular monitoring    Bowel Regimen:  Senna 1 tablet daily at bedtime    Treatment plan discussed with vascular attending at bedside, and bedside nursing staff  APS will continue to follow. Please contact Acute Pain Service - [facility] via [facility] from [phone] with additional questions or concerns. See [employer] or [facility] for additional contacts and after hours information. History of Present Illness    Admit Date:  7/28/2023  Hospital Day:  4 days  Primary Service:  Hospitalist  Attending Provider:  Katherine Orozco MD  Reason for Consult / Principal Problem: Postop pain  HPI: Ally Barber is a 46 y.o. male with past medical history of tobacco use, hypertension, hyperlipidemia who presented on 7/28/2023 with right lower extremity pain with ambulation over the past several weeks. Patient was found to have acute arterial occlusions in the mid and distal superficial femoral artery as well as distal anterior tibial artery. Patient underwent right SFA cutdown, balloon angioplasty and stenting, right leg fasciotomy, angiogram on 7/29/2023 with vascular surgery and will require eventual fasciotomy closure. Acute pain service consulted for assistance in postoperative pain management. Current pain location(s): RLE  Pain Scale:   5  Quality: Tight, aching      Pain History: No history of chronic pain      I have reviewed the patient's controlled substance dispensing history in the Prescription Drug Monitoring Program in compliance with the [facility] regulations before prescribing any controlled substances.      Inpatient consult to Acute Pain Service  Consult performed by: Chari Burkitt, CRNP  Consult ordered by: Veronica Alford PA-C          Review of Systems   Constitutional: Positive for activity change. Negative for fatigue. Respiratory: Negative for chest tightness and shortness of breath. Gastrointestinal: Negative for abdominal distention, abdominal pain, constipation, diarrhea, nausea and vomiting. Musculoskeletal: Positive for arthralgias and myalgias. Negative for back pain and neck pain. Neurological: Positive for weakness (RLE) and numbness (RLE). Negative for dizziness. All other systems reviewed and are negative. Historical Information   Past Medical History:   Diagnosis Date   • Hypertension    • Lung nodule    • Prediabetes      Past Surgical History:   Procedure Laterality Date   • IR LOWER EXTREMITY ANGIOGRAM  7/29/2023   • THROMBECTOMY W/ EMBOLECTOMY Right 7/29/2023    Procedure: RIGHT LEG BALLOON ANGIOPLASTY, STENT, FASCIOTOMY;  Surgeon: Ken Palma MD;  [facility]: [facility];  Service: Vascular     Social History   Social History     Substance and Sexual Activity   Alcohol Use Yes   • Alcohol/week: 14.0 standard drinks of alcohol   • Types: 14 Cans of beer per week     Social History     Substance and Sexual Activity   Drug Use No     Social History     Tobacco Use   Smoking Status Every Day   • Packs/day: 0.50   • Types: Cigarettes   • Start date: 1988   • Passive exposure: Never   Smokeless Tobacco Never     Family History: History reviewed. No pertinent family history.     Meds/Allergies   all current active meds have been reviewed, current meds:   Current Facility-Administered Medications   Medication Dose Route Frequency   • acetaminophen (TYLENOL) tablet 975 mg  975 mg Oral Q8H [address]   • amLODIPine (NORVASC) tablet 5 mg  5 mg Oral Daily   • aspirin chewable tablet 81 mg  81 mg Oral Daily   • clopidogrel (PLAVIX) tablet 75 mg  75 mg Oral Daily   • gabapentin (NEURONTIN) capsule 300 mg  300 mg Oral TID   • heparin (porcine) 25,000 units in 0.45% NaCl 250 mL infusion (premix)  3-20 Units/kg/hr (Order-Specific) Intravenous Titrated   • hydrALAZINE (APRESOLINE) injection 10 mg  10 mg Intravenous Q6H PRN   • HYDROmorphone (DILAUDID) injection 0.5 mg  0.5 mg Intravenous Q3H PRN   • labetalol (NORMODYNE) injection 5 mg  5 mg Intravenous Q15 Min PRN   • methocarbamol (ROBAXIN) tablet 500 mg  500 mg Oral Q6H [place]   • nicotine (NICODERM CQ) 14 mg/24hr TD 24 hr patch 1 patch  1 patch Transdermal Daily   • oxyCODONE (ROXICODONE) IR tablet 5 mg  5 mg Oral Q4H PRN    Or   • oxyCODONE (ROXICODONE) immediate release tablet 10 mg  10 mg Oral Q4H PRN   • senna (SENOKOT) tablet 8.6 mg  1 tablet Oral HS   • silver nitrate-potassium nitrate (ARZOL SILVER NITRATE) 42-78 % applicator 3 applicator  3 applicator Topical Once    and PTA meds:   Prior to Admission Medications   Prescriptions Last Dose Informant Patient Reported? Taking? amLODIPine (NORVASC) 5 mg tablet  Self No No   Sig: Take 1 tablet (5 mg total) by mouth daily for 90 days   aspirin (ECOTRIN LOW STRENGTH) 81 mg EC tablet Not Taking  No No   Sig: Take 1 tablet (81 mg total) by mouth daily   Patient not taking: Reported on 7/28/2023   methocarbamol (ROBAXIN) 500 mg tablet 7/28/2023  No Yes   Sig: Take 1 tablet (500 mg total) by mouth 4 (four) times a day   predniSONE 50 mg tablet Not Taking Self Yes No   Sig: take 1 tablet IN THE MORNING for 7 days   Patient not taking: Reported on 7/28/2023      Facility-Administered Medications: None       No Known Allergies    Objective   Temp:  [98.2 °F (36.8 °C)-98.3 °F (36.8 °C)] 98.3 °F (36.8 °C)  HR:  [80-83] 83  Resp:  [16-18] 18  BP: (134-139)/(69-71) 139/69    Intake/Output Summary (Last 24 hours) at 8/1/2023 1502  Last data filed at 8/1/2023 1151  Gross per 24 hour   Intake 1107.36 ml   Output 1150 ml   Net -42.64 ml       Physical Exam  Vitals reviewed. Constitutional:       General: He is not in acute distress. Appearance: He is not ill-appearing or toxic-appearing. Cardiovascular:      Rate and Rhythm: Normal rate. Pulmonary:      Effort: Pulmonary effort is normal. No respiratory distress. Abdominal:      General: Abdomen is flat. There is no distension. Palpations: Abdomen is soft. Tenderness: There is no abdominal tenderness. Musculoskeletal:         General: Tenderness (RLE) present. Cervical back: Normal range of motion. Right lower leg: Edema present. Left lower leg: No edema. Skin:     General: Skin is warm and dry. Coloration: Skin is not pale. Findings: No rash. Neurological:      Mental Status: He is alert and oriented to person, place, and time. Mental status is at baseline. Sensory: Sensory deficit (decreased sensation to light touch RLE) present. Motor: Weakness (RLE) present. Lab Results: I have personally reviewed pertinent labs. Imaging Studies: I have personally reviewed pertinent reports. Counseling / Coordination of Care  Total floor / unit time spent today Level 1 = 20 minutes. Greater than 50% of total time was spent with the patient and / or family counseling and / or coordination of care. A description of the counseling / coordination of care: Patient interview, physical examination, review of PDMP, review of medical record, review of imaging and laboratory data, development of pain management plan, discussion of pain management plan with patient and primary service. Please note that the APS provides consultative services regarding pain management only. With the exception of ketamine and epidural infusions and except when indicated, final decisions regarding starting or changing doses of analgesic medications are at the discretion of the consulting service. Off hours consultation and/or medication management is generally not available.     NATALYA Burt  Acute Pain Service

## 2023-08-01 NOTE — RESTORATIVE TECHNICIAN NOTE
Restorative Technician Note      Patient Name: Jagdish Taylor     Note Type: Mobility  Patient Position Upon Consult: Supine  Activity Performed: Transferred; Dangled; Stood  Patient Position at End of Consult: Bedside chair;  All needs within reach

## 2023-08-01 NOTE — PROGRESS NOTES
[address] [place] [address]  Progress Note  Name: Landon Vidal  MRN: [id number]  Unit/Bed#: [facility] 503-01 I Date of Admission: 7/28/2023   Date of Service: 8/1/2023 I Hospital Day: 4    Assessment/Plan   * Arterial occlusion  Assessment & Plan  · Presented with 2 weeks of right lower extremity pain/claudication  · Articular Doppler ultrasound showing acute occlusion of the mid/distal superficial femoral artery. Occlusion of the distal anterior tibial artery. · Transferred to [address] [place] [address] for evaluation    · Status post right SFA cutdown, balloon angioplasty, stenting, right leg fasciotomy on 7/29  · Primary management per vascular surgery team  · Continue on heparin drip, aspirin  · Multi-Podus boots  · Aspirin and statin  · Local wound care  · Eventual fasciotomy closure      Hyperlipidemia, mixed  Assessment & Plan  · Continue statin    Leukocytosis (leucocytosis)  Assessment & Plan  · White count noted at 17 K on 7/31  · Patient without any infectious symptoms and afebrile; this is likely reactive secondary to surgery  · Procalcitonin negative  · Continue to monitor off antibiotics  · Improved    Essential hypertension  Assessment & Plan  · Controlled on amlodipine 5 mg daily; continue to monitor    Tobacco dependence  Assessment & Plan  · NRT ordered while inpatient  · Cessation advised    Abnormal chest CT  Assessment & Plan  · Patient had a CTA chest , abdomen and pelvis today  · CT read as multiple right upper lobe opacity concerning for infectious process likely atypical infection. Patient denies any shortness of breath cough or any respiratory symptoms. Leukocytosis noted but remained afebrile  · Given lack of pulmonary symptoms to monitor off of antibiotics. · Procalcitonin negative        VTE Pharmacologic Prophylaxis:   Moderate Risk (Score 3-4) - Pharmacological DVT Prophylaxis Ordered: heparin drip.     Patient Centered Rounds: I performed bedside rounds with nursing staff today. Discussions with Specialists or Other Care Team Provider: LUCIANA.     Education and Discussions with Family / Patient: Patient declined call to . Total Time Spent on Date of Encounter in care of patient: 35 minutes This time was spent on one or more of the following: performing physical exam; counseling and coordination of care; obtaining or reviewing history; documenting in the medical record; reviewing/ordering tests, medications or procedures; communicating with other healthcare professionals and discussing with patient's family/caregivers. Current Length of Stay: 4 day(s)  Current Patient Status: Inpatient   Certification Statement: The patient will continue to require additional inpatient hospital stay due to fasciotomy closure, pain management, heparin gtt  Discharge Plan: Anticipate discharge in 48-72 hrs to home with home services. Code Status: Level 1 - Full Code    Subjective:   Patient seen and examined. He appears uncomfortable secondary to pain. Otherwise has been afebrile. Denies any cough, difficulty breathing, abdominal pain, diarrhea, nausea, vomiting, or dysuria. Biggest issue is pain control. Objective:     Vitals:   Temp (24hrs), Av.3 °F (36.8 °C), Min:98.2 °F (36.8 °C), Max:98.3 °F (36.8 °C)    Temp:  [98.2 °F (36.8 °C)-98.3 °F (36.8 °C)] 98.3 °F (36.8 °C)  HR:  [80-83] 83  Resp:  [16-18] 18  BP: (131-139)/(60-71) 139/69  SpO2:  [93 %-100 %] 100 %  Body mass index is 22.99 kg/m². Input and Output Summary (last 24 hours): Intake/Output Summary (Last 24 hours) at 2023 1422  Last data filed at 2023 1151  Gross per 24 hour   Intake 1107.36 ml   Output 1150 ml   Net -42.64 ml       PHYSICAL EXAM:    Vitals signs reviewed  Constitutional   Awake and cooperative. Mildly distressed secondary to pain   Head/Neck   Normocephalic. Atraumatic. HEENT   No scleral icterus. EOMI. Heart   Regular rate and rhythm. No murmers.    Lungs Clear to auscultation bilaterally. Respirations unlaboured. Abdomen   Soft. Nontender. Nondistended. Skin   Skin color normal. No rashes. Extremities   No deformities. No peripheral edema. Left lower extremity in bandages, clean and dry. Neuro   Alert and oriented. No new deficits. Psych   Mood stable. Affect normal.         Additional Data:     Labs:  Results from last 7 days   Lab Units 08/01/23  0536 07/31/23  0741   WBC Thousand/uL 14.05* 17.67*   HEMOGLOBIN g/dL 12.8 12.7   HEMATOCRIT % 36.5 37.3   PLATELETS Thousands/uL 541* 499*   NEUTROS PCT %  --  63   LYMPHS PCT %  --  29   MONOS PCT %  --  8   EOS PCT %  --  0     Results from last 7 days   Lab Units 08/01/23  0536 07/31/23  0741   SODIUM mmol/L 134* 136   POTASSIUM mmol/L 3.8 3.5   CHLORIDE mmol/L 105 108   CO2 mmol/L 26 27   BUN mg/dL 10 11   CREATININE mg/dL 0.63 0.64   ANION GAP mmol/L 3 1   CALCIUM mg/dL 8.7 8.5   ALBUMIN g/dL  --  3.1*   TOTAL BILIRUBIN mg/dL  --  0.66   ALK PHOS U/L  --  73   ALT U/L  --  57   AST U/L  --  38   GLUCOSE RANDOM mg/dL 112 112     Results from last 7 days   Lab Units 07/29/23  1042   INR  1.03             Results from last 7 days   Lab Units 08/01/23  0536   PROCALCITONIN ng/ml 0.05       Lines/Drains:  Invasive Devices     Peripheral Intravenous Line  Duration           Peripheral IV 07/29/23 Left Arm 2 days    Peripheral IV 08/01/23 Distal;Left;Ventral (anterior) Forearm <1 day                      Imaging: No pertinent imaging reviewed.     Recent Cultures (last 7 days):         Last 24 Hours Medication List:   Current Facility-Administered Medications   Medication Dose Route Frequency Provider Last Rate   • amLODIPine  5 mg Oral Daily Luis Felipe Hamm DO     • aspirin  81 mg Oral Daily Luis Felipe Williamson DO     • clopidogrel  75 mg Oral Daily Luis eFlipe Williamson DO     • gabapentin  300 mg Oral TID Armen Abernathy MD     • heparin (porcine)  3-20 Units/kg/hr (Order-Specific) Intravenous Titrated Luis Felipe Hamm DO 21 Units/kg/hr (08/01/23 0115)   • hydrALAZINE  10 mg Intravenous Q6H PRN Luis Felipe Fonseca DO     • HYDROmorphone  0.5 mg Intravenous Q3H PRN Lusi Felipe Williamson DO     • labetalol  5 mg Intravenous Q15 Min PRN Husam Fairchild DO     • nicotine  1 patch Transdermal Daily Luis Felipe Williamson DO     • oxyCODONE  2.5 mg Oral Q4H PRN Luis Felipe Williamson DO      Or   • oxyCODONE  5 mg Oral Q4H PRN Husam Fairchild DO     • senna  1 tablet Oral HS Luis Felipe Williamson DO     • silver nitrate-potassium nitrate  3 applicator Topical Once Stone SAM Walton          Today, Patient Was Seen By: Vijay Villarreal DO    **Please Note: This note may have been constructed using a voice recognition system. **

## 2023-08-01 NOTE — PROGRESS NOTES
Progress Note - Vascular Surgery     Assessment/Plan:  46 y.o. male who is status post right SFA cutdown, balloon angioplasty and stenting, right leg fasciotomy, angiogram, improved motor deficits. · RLE dressing changed this visit. Silver nitrate applied to prominent oozing sites. · Will continue to monitor fasciotomy site for delayed primary closure. · C/w R multipodus boot  · C/w heparin drip  · C/w NV checks  · C/w Pain control  · Consider transition to Coumadin & Plavix once fasciotomy site is closed. Subjective:  Patient seen bedside. Pt able to move R ankle against minimal resistance. Pt reports numbness at R foot, proprioception intact. Pt reports RLE pain. Pt reports strike-through of RLE dressing which was reinforced by Nursing. Vitals:  /69   Pulse 83   Temp 98.3 °F (36.8 °C) (Oral)   Resp 18   Ht 5' 9" (1.753 m)   Wt 70.6 kg (155 lb 11.2 oz)   SpO2 100%   BMI 22.99 kg/m²     I/Os:  I/O last 3 completed shifts: In: 2115.3 [P.O.:1462; I.V.:653.3]  Out: 2400 [Urine:2400]  No intake/output data recorded.     Lab Results and Cultures:   Lab Results   Component Value Date    WBC 14.05 (H) 08/01/2023    HGB 12.8 08/01/2023    HCT 36.5 08/01/2023    MCV 81 (L) 08/01/2023     (H) 08/01/2023     Lab Results   Component Value Date    CALCIUM 8.7 08/01/2023    K 3.8 08/01/2023    CO2 26 08/01/2023     08/01/2023    BUN 10 08/01/2023    CREATININE 0.63 08/01/2023     Lab Results   Component Value Date    INR 1.03 07/29/2023    INR 1.08 07/28/2023    INR 1.08 10/01/2018    PROTIME 13.7 07/29/2023    PROTIME 13.8 07/28/2023    PROTIME 13.9 10/01/2018          Medications:  Current Facility-Administered Medications   Medication Dose Route Frequency   • amLODIPine (NORVASC) tablet 5 mg  5 mg Oral Daily   • aspirin chewable tablet 81 mg  81 mg Oral Daily   • clopidogrel (PLAVIX) tablet 75 mg  75 mg Oral Daily   • gabapentin (NEURONTIN) capsule 300 mg  300 mg Oral TID   • heparin (porcine) 25,000 units in 0.45% NaCl 250 mL infusion (premix)  3-20 Units/kg/hr (Order-Specific) Intravenous Titrated   • hydrALAZINE (APRESOLINE) injection 10 mg  10 mg Intravenous Q6H PRN   • HYDROmorphone (DILAUDID) injection 0.5 mg  0.5 mg Intravenous Q3H PRN   • labetalol (NORMODYNE) injection 5 mg  5 mg Intravenous Q15 Min PRN   • nicotine (NICODERM CQ) 14 mg/24hr TD 24 hr patch 1 patch  1 patch Transdermal Daily   • oxyCODONE (ROXICODONE) split tablet 2.5 mg  2.5 mg Oral Q4H PRN    Or   • oxyCODONE (ROXICODONE) IR tablet 5 mg  5 mg Oral Q4H PRN   • senna (SENOKOT) tablet 8.6 mg  1 tablet Oral HS   • silver nitrate-potassium nitrate (ARZOL SILVER NITRATE) 75-71 % applicator 3 applicator  3 applicator Topical Once         Physical Exam:    General appearance: alert and oriented, in no acute distress  Skin: warm to touch  Neurologic: light touch sensation intact, proprioception intact  Head: Normocephalic, without obvious abnormality, atraumatic  Eyes: EOMI  Lungs: No resp distress  Heart: No edema  Abdomen: soft, NT  Extremities: warm to touch, able to dorsi/plantarflex right foot, limited ROM 2/2 guarding    Wound/Incision:  RLE lateral fasciotomy open with oozing, (-) active bleeding  Cutdown site with skin glue intact    Pulse exam:  Femoral: Right: 2+ Left: 2+  DP: Right: doppler signal Left: 1+  PT: Right: doppler signal Left: 1+             Louie Jolley DPM  8/1/2023

## 2023-08-01 NOTE — INCIDENTAL FINDINGS
The following findings require follow up:  Radiographic finding   Finding: CTA c/a/p   · 7mm pancreatic tail lesion  · 1.5cm adrenal nodule   Follow up required: Surgical oncology consultation w/ Dr. Cristina Claude     Follow up should be done within 1  month(s)    Please notify the following clinician to assist with the follow up:   Dr. Cristina Claude          *CTA findings d/w Dr. Philly Pozo via TT regarding inpatient vs outpatient evaluation. Recommendation was for outpatient f/u. Dr. Philly Pozo contact information placed for f/u         Mimi Mendes.   8/1/2023

## 2023-08-01 NOTE — ASSESSMENT & PLAN NOTE
· Presented with 2 weeks of right lower extremity pain/claudication  · Articular Doppler ultrasound showing acute occlusion of the mid/distal superficial femoral artery. Occlusion of the distal anterior tibial artery.   · Transferred to [facility] for evaluation    · Status post right SFA cutdown, balloon angioplasty, stenting, right leg fasciotomy on 7/29  · Primary management per vascular surgery team  · Continue on heparin drip, aspirin  · Multi-Podus boots  · Aspirin and statin  · Local wound care  · Eventual fasciotomy closure

## 2023-08-01 NOTE — ASSESSMENT & PLAN NOTE
· White count noted at 17 K on 7/31  · Patient without any infectious symptoms and afebrile; this is likely reactive secondary to surgery  · Procalcitonin negative  · Continue to monitor off antibiotics  · Improved

## 2023-08-01 NOTE — PROGRESS NOTES
Acute posthemorrhagic anemia (ABLA) anticipated post-operative (now stable) as evidenced by  Hgb 16.4 >14.1 > 12.7 treated with serial lab monitoring, close post operative monitoring by vascular surgery team, monitoring incision site.         Findings: Hg drop greater than 2 g/dL in 24 hrs.

## 2023-08-01 NOTE — PLAN OF CARE
Problem: MOBILITY - ADULT  Goal: Maintain or return to baseline ADL function  Description: INTERVENTIONS:  -  Assess patient's ability to carry out ADLs; assess patient's baseline for ADL function and identify physical deficits which impact ability to perform ADLs (bathing, care of mouth/teeth, toileting, grooming, dressing, etc.)  - Assess/evaluate cause of self-care deficits   - Assess range of motion  - Assess patient's mobility; develop plan if impaired  - Assess patient's need for assistive devices and provide as appropriate  - Encourage maximum independence but intervene and supervise when necessary  - Involve family in performance of ADLs  - Assess for home care needs following discharge   - Consider OT consult to assist with ADL evaluation and planning for discharge  - Provide patient education as appropriate  Outcome: Progressing  Goal: Maintains/Returns to pre admission functional level  Description: INTERVENTIONS:  - Perform BMAT or MOVE assessment daily.   - Set and communicate daily mobility goal to care team and patient/family/caregiver. - Collaborate with rehabilitation services on mobility goals if consulted  - Perform Range of Motion  times a day. - Reposition patient every  hours.   - Dangle patient  times a day  - Stand patient  times a day  - Ambulate patient  times a day  - Out of bed to chair  times a day   - Out of bed for meals  times a day  - Out of bed for toileting  - Record patient progress and toleration of activity level   Outcome: Progressing     Problem: Prexisting or High Potential for Compromised Skin Integrity  Goal: Skin integrity is maintained or improved  Description: INTERVENTIONS:  - Identify patients at risk for skin breakdown  - Assess and monitor skin integrity  - Assess and monitor nutrition and hydration status  - Monitor labs   - Assess for incontinence   - Turn and reposition patient  - Assist with mobility/ambulation  - Relieve pressure over bony prominences  - Avoid friction and shearing  - Provide appropriate hygiene as needed including keeping skin clean and dry  - Evaluate need for skin moisturizer/barrier cream  - Collaborate with interdisciplinary team   - Patient/family teaching  - Consider wound care consult   Outcome: Progressing

## 2023-08-02 ENCOUNTER — ANESTHESIA EVENT (INPATIENT)
Dept: PERIOP | Facility: HOSPITAL | Age: 53
End: 2023-08-02
Payer: COMMERCIAL

## 2023-08-02 PROBLEM — D62 ACUTE BLOOD LOSS ANEMIA: Status: ACTIVE | Noted: 2023-08-02

## 2023-08-02 LAB
ABO GROUP BLD: NORMAL
ABO GROUP BLD: NORMAL
ANION GAP SERPL CALCULATED.3IONS-SCNC: 4 MMOL/L
APTT PPP: 82 SECONDS (ref 23–37)
BASOPHILS # BLD MANUAL: 0.11 THOUSAND/UL (ref 0–0.1)
BASOPHILS NFR MAR MANUAL: 1 % (ref 0–1)
BLD GP AB SCN SERPL QL: NEGATIVE
BUN SERPL-MCNC: 10 MG/DL (ref 5–25)
CALCIUM SERPL-MCNC: 8.9 MG/DL (ref 8.3–10.1)
CHLORIDE SERPL-SCNC: 106 MMOL/L (ref 96–108)
CO2 SERPL-SCNC: 28 MMOL/L (ref 21–32)
CREAT SERPL-MCNC: 0.69 MG/DL (ref 0.6–1.3)
EOSINOPHIL # BLD MANUAL: 0.11 THOUSAND/UL (ref 0–0.4)
EOSINOPHIL NFR BLD MANUAL: 1 % (ref 0–6)
ERYTHROCYTE [DISTWIDTH] IN BLOOD BY AUTOMATED COUNT: 13.1 % (ref 11.6–15.1)
GFR SERPL CREATININE-BSD FRML MDRD: 107 ML/MIN/1.73SQ M
GLUCOSE SERPL-MCNC: 113 MG/DL (ref 65–140)
HCT VFR BLD AUTO: 35.8 % (ref 36.5–49.3)
HGB BLD-MCNC: 12.3 G/DL (ref 12–17)
LYMPHOCYTES # BLD AUTO: 2.66 THOUSAND/UL (ref 0.6–4.47)
LYMPHOCYTES # BLD AUTO: 20 % (ref 14–44)
MCH RBC QN AUTO: 27.9 PG (ref 26.8–34.3)
MCHC RBC AUTO-ENTMCNC: 34.4 G/DL (ref 31.4–37.4)
MCV RBC AUTO: 81 FL (ref 82–98)
MONOCYTES # BLD AUTO: 0.55 THOUSAND/UL (ref 0–1.22)
MONOCYTES NFR BLD: 5 % (ref 4–12)
NEUTROPHILS # BLD MANUAL: 7.64 THOUSAND/UL (ref 1.85–7.62)
NEUTS SEG NFR BLD AUTO: 69 % (ref 43–75)
PLATELET # BLD AUTO: 536 THOUSANDS/UL (ref 149–390)
PLATELET BLD QL SMEAR: ADEQUATE
PMV BLD AUTO: 8.5 FL (ref 8.9–12.7)
POTASSIUM SERPL-SCNC: 3.8 MMOL/L (ref 3.5–5.3)
RBC # BLD AUTO: 4.41 MILLION/UL (ref 3.88–5.62)
RBC MORPH BLD: NORMAL
RH BLD: POSITIVE
RH BLD: POSITIVE
SODIUM SERPL-SCNC: 138 MMOL/L (ref 135–147)
SPECIMEN EXPIRATION DATE: NORMAL
VARIANT LYMPHS # BLD AUTO: 4 %
WBC # BLD AUTO: 11.07 THOUSAND/UL (ref 4.31–10.16)

## 2023-08-02 PROCEDURE — 97530 THERAPEUTIC ACTIVITIES: CPT

## 2023-08-02 PROCEDURE — 99232 SBSQ HOSP IP/OBS MODERATE 35: CPT

## 2023-08-02 PROCEDURE — 97535 SELF CARE MNGMENT TRAINING: CPT

## 2023-08-02 PROCEDURE — 99024 POSTOP FOLLOW-UP VISIT: CPT | Performed by: SURGERY

## 2023-08-02 PROCEDURE — 85730 THROMBOPLASTIN TIME PARTIAL: CPT | Performed by: SURGERY

## 2023-08-02 PROCEDURE — 80048 BASIC METABOLIC PNL TOTAL CA: CPT | Performed by: PHYSICIAN ASSISTANT

## 2023-08-02 PROCEDURE — 99232 SBSQ HOSP IP/OBS MODERATE 35: CPT | Performed by: INTERNAL MEDICINE

## 2023-08-02 PROCEDURE — 85007 BL SMEAR W/DIFF WBC COUNT: CPT | Performed by: INTERNAL MEDICINE

## 2023-08-02 PROCEDURE — 86900 BLOOD TYPING SEROLOGIC ABO: CPT | Performed by: NURSE PRACTITIONER

## 2023-08-02 PROCEDURE — 86901 BLOOD TYPING SEROLOGIC RH(D): CPT | Performed by: NURSE PRACTITIONER

## 2023-08-02 PROCEDURE — 97116 GAIT TRAINING THERAPY: CPT

## 2023-08-02 PROCEDURE — 85027 COMPLETE CBC AUTOMATED: CPT | Performed by: INTERNAL MEDICINE

## 2023-08-02 PROCEDURE — 86850 RBC ANTIBODY SCREEN: CPT | Performed by: NURSE PRACTITIONER

## 2023-08-02 RX ORDER — CEFAZOLIN SODIUM 1 G/50ML
1000 SOLUTION INTRAVENOUS ONCE
Status: DISCONTINUED | OUTPATIENT
Start: 2023-08-02 | End: 2023-08-02

## 2023-08-02 RX ORDER — CHLORHEXIDINE GLUCONATE 0.12 MG/ML
15 RINSE ORAL ONCE
Status: COMPLETED | OUTPATIENT
Start: 2023-08-02 | End: 2023-08-03

## 2023-08-02 RX ORDER — ATORVASTATIN CALCIUM 40 MG/1
40 TABLET, FILM COATED ORAL
Status: DISCONTINUED | OUTPATIENT
Start: 2023-08-02 | End: 2023-08-05 | Stop reason: HOSPADM

## 2023-08-02 RX ORDER — CEFAZOLIN SODIUM 1 G/50ML
1000 SOLUTION INTRAVENOUS ONCE
Status: DISCONTINUED | OUTPATIENT
Start: 2023-08-03 | End: 2023-08-03 | Stop reason: HOSPADM

## 2023-08-02 RX ADMIN — HEPARIN SODIUM 21 UNITS/KG/HR: 10000 INJECTION, SOLUTION INTRAVENOUS at 13:09

## 2023-08-02 RX ADMIN — GABAPENTIN 300 MG: 300 CAPSULE ORAL at 16:06

## 2023-08-02 RX ADMIN — ATORVASTATIN CALCIUM 40 MG: 40 TABLET, FILM COATED ORAL at 17:14

## 2023-08-02 RX ADMIN — METHOCARBAMOL 500 MG: 500 TABLET ORAL at 05:06

## 2023-08-02 RX ADMIN — CLOPIDOGREL BISULFATE 75 MG: 75 TABLET ORAL at 08:37

## 2023-08-02 RX ADMIN — ACETAMINOPHEN 975 MG: 325 TABLET, FILM COATED ORAL at 05:06

## 2023-08-02 RX ADMIN — GABAPENTIN 300 MG: 300 CAPSULE ORAL at 21:02

## 2023-08-02 RX ADMIN — ACETAMINOPHEN 975 MG: 325 TABLET, FILM COATED ORAL at 21:01

## 2023-08-02 RX ADMIN — NICOTINE 1 PATCH: 14 PATCH, EXTENDED RELEASE TRANSDERMAL at 08:37

## 2023-08-02 RX ADMIN — AMLODIPINE BESYLATE 5 MG: 5 TABLET ORAL at 08:37

## 2023-08-02 RX ADMIN — METHOCARBAMOL 500 MG: 500 TABLET ORAL at 13:57

## 2023-08-02 RX ADMIN — ACETAMINOPHEN 975 MG: 325 TABLET, FILM COATED ORAL at 13:56

## 2023-08-02 RX ADMIN — METHOCARBAMOL 500 MG: 500 TABLET ORAL at 17:14

## 2023-08-02 RX ADMIN — GABAPENTIN 300 MG: 300 CAPSULE ORAL at 08:37

## 2023-08-02 NOTE — OCCUPATIONAL THERAPY NOTE
Occupational Therapy Progress Note     Patient Name: Norah Rojas  Today's Date: 8/2/2023  Problem List  Principal Problem:    Arterial occlusion  Active Problems:    Abnormal chest CT    Tobacco dependence    Essential hypertension    Leukocytosis (leucocytosis)    Hyperlipidemia, mixed    Acute blood loss anemia            08/02/23 1000   OT Last Visit   OT Visit Date 08/02/23   Note Type   Note Type Treatment   Pain Assessment   Pain Assessment Tool 0-10   Pain Score No Pain   Restrictions/Precautions   Weight Bearing Precautions Per Order No   Other Precautions Multiple lines   Lifestyle   Autonomy I with ADLS and IADLS +    Reciprocal Relationships supportive family however reports his wife is able to provide limited support as she has MS   Service to Others works FT as an    Intrinsic Gratification Working on cars   ADL   Where Assessed Edge of bed   [address] [facility] 6  Modified independent   LB Dressing Deficit Setup   LB Dressing Comments Pt sat EOB and demonstrated ability to bring foot to opposite knee to adjust socks. Toileting Assistance  6  Modified independent   Functional Standing Tolerance   Time 10 minutes   Activity functional mobility for household distance   Comments Pt ambulated with crutches to demonstrate ability to get around his house for ADLs/IADLs. Bed Mobility   Supine to Sit 6  Modified independent   Additional Comments Pt in recliner chair at end of session   Transfers   Sit to Stand 6  Modified independent   Stand to Sit 6  Modified independent   Toilet transfer 6  Modified independent   Subjective   Subjective "I'll wait for my wife to get washed and dressed"   Cognition   Overall Cognitive Status [facility]   Arousal/Participation Alert; Responsive; Cooperative   Attention Within functional limits   Orientation Level Oriented X4   Memory Within functional limits   Following Commands Follows all commands and directions without difficulty   Comments Pt agreeable to session   Activity Tolerance   Activity Tolerance Patient tolerated treatment well   Medical Staff Made Aware Cleared with RN   Assessment   Assessment Patient participated in Skilled OT session this date with interventions consisting of ADL re training with the use of correct body mechanics, Energy Conservation techniques, safety awareness and fall prevention techniques and increase dynamic sit/ stand balance during functional activity. Patient agreeable to OT treatment session, upon arrival patient was found supine in bed. In comparison to previous session, patient with improvements in standing balance, transfer status, ADL ability, and endurance. Patient has attained all treatment goals and is now demonstrating ability to complete UB/LB ADLs at a mod I level with the use of crutches. Patient was educated on safety with self care transfers and energy conservation, patient verbalized understanding and demonstrated recommended plan correctly. Patient appears to be functioning close to recent baseline and has family support at home. No further acute OT needs identified at this time to warrant continuation of services. D/C OT services. From OT standpoint, recommendation at time of d/c would be Home with family support.    Plan   Goal Expiration Date 08/14/23   OT Treatment Day 1   OT Frequency 2-3x/wk   Recommendation   OT Discharge Recommendation No rehabilitation needs   AM-PAC Daily Activity Inpatient   Lower Body Dressing 3   Bathing 3   Toileting 4   Upper Body Dressing 4   Grooming 4   Eating 4   Daily Activity Raw Score 22   Daily Activity Standardized Score (Calc for Raw Score >=11) 47.1       Catarina Lindsey OTR/L

## 2023-08-02 NOTE — PLAN OF CARE
Problem: OCCUPATIONAL THERAPY ADULT  Goal: Performs self-care activities at highest level of function for planned discharge setting. See evaluation for individualized goals. Description: Treatment Interventions: ADL retraining, Functional transfer training, UE strengthening/ROM, Endurance training, Equipment evaluation/education, Patient/family training, Compensatory technique education, Continued evaluation, Energy conservation, Activityengagement          See flowsheet documentation for full assessment, interventions and recommendations. Outcome: Adequate for Discharge  Note: Limitation: Decreased ADL status, Decreased UE strength, Decreased Safe judgement during ADL, Decreased endurance, Decreased self-care trans, Decreased high-level ADLs  Prognosis: Good  Assessment: Patient participated in Skilled OT session this date with interventions consisting of ADL re training with the use of correct body mechanics, Energy Conservation techniques, safety awareness and fall prevention techniques and increase dynamic sit/ stand balance during functional activity. Patient agreeable to OT treatment session, upon arrival patient was found supine in bed. In comparison to previous session, patient with improvements in standing balance, transfer status, ADL ability, and endurance. Patient has attained all treatment goals and is now demonstrating ability to complete UB/LB ADLs at a mod I level with the use of crutches. Patient was educated on safety with self care transfers and energy conservation, patient verbalized understanding and demonstrated recommended plan correctly. Patient appears to be functioning close to recent baseline and has family support at home. No further acute OT needs identified at this time to warrant continuation of services. D/C OT services. From OT standpoint, recommendation at time of d/c would be Home with family support.      OT Discharge Recommendation: No rehabilitation needs

## 2023-08-02 NOTE — ASSESSMENT & PLAN NOTE
47 y/o M presents with subacute Right limb ischemia w/ c/o 2 weeks of pain and numbness in setting of Right SFA occlusion    S/P Right SFA cut-down, Agram, SFA PTA/stent, and Right anterolateral fasciotomy 7/29    Plan:  Continue Heparin gtt, transition to coumadin prior to discharge following fasciotomy closure  Continue Plavix, will d/c ASA and plan for Plavix and coumadin therapy at discharge  Start statin therapy, lipitor 40mg daily  Daily Fasciotomy dressing changes per Vascular team, to consider return to OR for washout and possible fasciotomy closure this week.   APS following for pain control  Multipodus boot   PT/OT  Incidental pancreatic lesion and adrenal lesion noted on CT, outpatient follow-up with Surgical Oncology  Case management for dispo planning

## 2023-08-02 NOTE — PLAN OF CARE
Problem: PHYSICAL THERAPY ADULT  Goal: Performs mobility at highest level of function for planned discharge setting. See evaluation for individualized goals. Description: Treatment/Interventions: Functional transfer training, LE strengthening/ROM, Therapeutic exercise, Endurance training, Patient/family training, Equipment eval/education, Bed mobility, Gait training, OT, Spoke to nursing  Equipment Recommended: Ivelisse Rizzo       See flowsheet documentation for full assessment, interventions and recommendations. Outcome: Adequate for Discharge  Note: Prognosis: Good  Problem List: Impaired balance, Decreased mobility, Pain, Decreased skin integrity  Assessment: PT initiated treatment session in order to assist patient in achieving goals to improve transfers, gait training, and overall activity tolerance. Patient remains with open fasciotomy to R LE with multi-podus boot and continues to maintain R LE NWB. He expresses that if he did not have multiple lines (continuous pulse-ox, telemetry monitor, IV heparin) he would be able to ambulate without assistance. Today, with therapist managing lines patient was mod-I for bed mobility and sit<-->stand transfers and S level for ambulation. With use of crutches patient walked 90 feet x 2 with 1 standing rest break. He did not demonstrate LOB. During education provided to patient he was made aware that after his fasciotomy is closed the WB will be up to physician (MARÍA bourgeois WBAT). He plans on using crutches either way. Patient overall dismissive toward participation and education in therapy and states that he does not require follow up this admission. He does not present with questions/concerns regarding mobility this admission or at d/c. He will be d/c from PT caseload and is recommended for OPPT f/u for R LE as appropriate per physician.  Also encouraged ongoing ambulation with S of RN staff/restorative therapist.        PT Discharge Recommendation: Home with outpatient rehabilitation (OPPT for R LE when appropriate per physician)    See flowsheet documentation for full assessment.

## 2023-08-02 NOTE — ASSESSMENT & PLAN NOTE
· Presented with 2 weeks of right lower extremity pain/claudication  · Articular Doppler ultrasound showing acute occlusion of the mid/distal superficial femoral artery. Occlusion of the distal anterior tibial artery.   · Transferred to [facility] for evaluation    · Status post right SFA cutdown, balloon angioplasty, stenting, right leg fasciotomy on 7/29  · Post additional washout and fasciotomy closure on 8/3  · Primary management per vascular surgery team  · Continue on heparin drip with bridge to Coumadin, aspirin, statin  · Multi-Podus boots  · Local wound care

## 2023-08-02 NOTE — PROGRESS NOTES
Progress Note - Acute Pain Service    Deneen Sheikh 46 y.o. male MRN: [id number]  Unit/Bed#: [facility] 503-01 Encounter: [id number]      Assessment:   Principal Problem:    Arterial occlusion  Active Problems:    Abnormal chest CT    Tobacco dependence    Essential hypertension    Leukocytosis (leucocytosis)    Hyperlipidemia, mixed    Acute blood loss anemia    Hyla Gaines Perri Prader is a 46 y.o. male  with past medical history of tobacco use, hypertension, hyperlipidemia who presented on 7/28/2023 with right lower extremity pain with ambulation over the past several weeks. Patient was found to have acute arterial occlusions in the mid and distal superficial femoral artery as well as distal anterior tibial artery. Patient underwent right SFA cutdown, balloon angioplasty and stenting, right leg fasciotomy, angiogram on 7/29/2023 with vascular surgery and will require eventual fasciotomy closure. Acute pain service consulted for assistance in postoperative pain management. Patient seen at bedside this morning, sitting up in chair. He reports his pain has significantly improved since yesterday and has not required any as needed opioid analgesics in over 24 hours. States he was able to get up and ambulate with crutches in the hallway this morning with staff and overall feels well. Timing of fasciotomy closure is unknown at this time. Plan:   Multimodal analgesia:  · Continue oxycodone 5 mg / 10 mg every 4 hours as needed, for moderate/severe pain  · Continue IV Dilaudid 0.5 mg every 3 hours as needed, for breakthrough pain or for dressing changes  · Continue Tylenol 975 mg every 8 hours scheduled  · Continue Robaxin 500 mg every 6 hours scheduled, home medication  · Continue gabapentin 300 mg 3 times daily  · Estimated Creatinine Clearance: 121.2 mL/min (by C-G formula based on SCr of 0.69 mg/dL).   · Will hold off on any regional analgesia/nerve blocks at this time given ongoing neurovascular monitoring    Bowel Regimen:  Senna 1 tablet daily at bedtime    Acute pain service will continue to follow in the periphery. Please reach out should any acute pain issues arise. APS will continue to follow. Please contact Acute Pain Service - [facility] via [employer] from [phone] with additional questions or concerns. See [email or web address] or [facility] for additional contacts and after hours information. Pain History  Current pain location(s): RLE  Pain Scale:  0  24 hour history: No acute events overnight, patient hemodynamically stable. Patient has been ambulatory this morning with staff. Does report some ongoing numbness to the right lower extremity however pain has been adequately controlled. Tolerating oral diet and multimodal regimen. Denies shortness of breath, nausea/vomiting, constipation/diarrhea.     Opioid requirement previous 24 hours:   0 mg    Meds/Allergies   all current active meds have been reviewed and current meds:   Current Facility-Administered Medications   Medication Dose Route Frequency   • acetaminophen (TYLENOL) tablet 975 mg  975 mg Oral Q8H [address]   • amLODIPine (NORVASC) tablet 5 mg  5 mg Oral Daily   • atorvastatin (LIPITOR) tablet 40 mg  40 mg Oral After Dinner   • clopidogrel (PLAVIX) tablet 75 mg  75 mg Oral Daily   • gabapentin (NEURONTIN) capsule 300 mg  300 mg Oral TID   • heparin (porcine) 25,000 units in 0.45% NaCl 250 mL infusion (premix)  3-20 Units/kg/hr (Order-Specific) Intravenous Titrated   • hydrALAZINE (APRESOLINE) injection 10 mg  10 mg Intravenous Q6H PRN   • HYDROmorphone (DILAUDID) injection 0.5 mg  0.5 mg Intravenous Q3H PRN   • labetalol (NORMODYNE) injection 5 mg  5 mg Intravenous Q15 Min PRN   • methocarbamol (ROBAXIN) tablet 500 mg  500 mg Oral Q6H [address]   • nicotine (NICODERM CQ) 14 mg/24hr TD 24 hr patch 1 patch  1 patch Transdermal Daily   • oxyCODONE (ROXICODONE) IR tablet 5 mg  5 mg Oral Q4H PRN    Or   • oxyCODONE (ROXICODONE) immediate release tablet 10 mg  10 mg Oral Q4H PRN   • senna (SENOKOT) tablet 8.6 mg  1 tablet Oral HS   • silver nitrate-potassium nitrate (ARZOL SILVER NITRATE) 21-75 % applicator 3 applicator  3 applicator Topical Once       No Known Allergies    Objective     Temp:  [97.1 °F (36.2 °C)-97.8 °F (36.6 °C)] 97.8 °F (36.6 °C)  HR:  [64-82] 64  Resp:  [17-19] 17  BP: (122-158)/(61-76) 158/61    Physical Exam  Vitals reviewed. Constitutional:       General: He is not in acute distress. Appearance: He is not ill-appearing or toxic-appearing. Cardiovascular:      Rate and Rhythm: Normal rate. Pulmonary:      Effort: Pulmonary effort is normal. No respiratory distress. Chest:      Chest wall: No tenderness. Abdominal:      General: There is no distension. Palpations: Abdomen is soft. Tenderness: There is no abdominal tenderness. Musculoskeletal:         General: Tenderness present. Cervical back: Normal range of motion. Right lower leg: Edema present. Left lower leg: No edema. Skin:     General: Skin is warm and dry. Coloration: Skin is not pale. Findings: No rash. Neurological:      Mental Status: He is alert and oriented to person, place, and time. Mental status is at baseline. Sensory: Sensory deficit (RLE) present. Motor: Weakness (RLE) present. Psychiatric:         Mood and Affect: Mood normal.         Behavior: Behavior normal.         Lab Results:   Results from last 7 days   Lab Units 08/02/23  0501   WBC Thousand/uL 11.07*   HEMOGLOBIN g/dL 12.3   HEMATOCRIT % 35.8*   PLATELETS Thousands/uL 536*      Results from last 7 days   Lab Units 08/02/23  0501 08/01/23  0536 07/31/23  0741   POTASSIUM mmol/L 3.8   < > 3.5   CHLORIDE mmol/L 106   < > 108   CO2 mmol/L 28   < > 27   BUN mg/dL 10   < > 11   CREATININE mg/dL 0.69   < > 0.64   CALCIUM mg/dL 8.9   < > 8.5   ALK PHOS U/L  --   --  73   ALT U/L  --   --  57   AST U/L  --   --  38    < > = values in this interval not displayed.        Imaging Studies: I have personally reviewed pertinent reports. Counseling / Coordination of Care  Total floor / unit time spent today 15 minutes. Greater than 50% of total time was spent with the patient and / or family counseling and / or coordination of care. A description of the counseling / coordination of care: Patient interview, physical examination, review of PDMP, review of medical record, review of imaging and laboratory data, development of pain management plan, discussion of pain management plan with patient and primary service. Please note that the APS provides consultative services regarding pain management only. With the exception of ketamine and epidural infusions and except when indicated, final decisions regarding starting or changing doses of analgesic medications are at the discretion of the consulting service. Off hours consultation and/or medication management is generally not available.     NATALYA Weems  Acute Pain Service

## 2023-08-02 NOTE — PROGRESS NOTES
[address] [facility]  Progress Note  Name: Herminia Jorge  MRN: [id number]  Unit/Bed#: [facility] 503-01 I Date of Admission: 7/28/2023   Date of Service: 8/2/2023 I Hospital Day: 5    Assessment/Plan   * Arterial occlusion  Assessment & Plan  · Presented with 2 weeks of right lower extremity pain/claudication  · Articular Doppler ultrasound showing acute occlusion of the mid/distal superficial femoral artery. Occlusion of the distal anterior tibial artery. · Transferred to [facility] for evaluation    · Status post right SFA cutdown, balloon angioplasty, stenting, right leg fasciotomy on 7/29  · Primary management per vascular surgery team  · Continue on heparin drip, aspirin, statin  · Multi-Podus boots  · Local wound care  · Eventual fasciotomy closure      Hyperlipidemia, mixed  Assessment & Plan  · Continue statin    Leukocytosis (leucocytosis)  Assessment & Plan  · White count noted at 17 K on 7/31  · Patient without any infectious symptoms and afebrile; this is likely reactive secondary to surgery  · Procalcitonin negative  · Continue to monitor off antibiotics  · Improved    Essential hypertension  Assessment & Plan  · Controlled on amlodipine 5 mg daily; continue to monitor    Tobacco dependence  Assessment & Plan  · NRT ordered while inpatient  · Cessation advised    Abnormal chest CT  Assessment & Plan  · Patient had a CTA chest , abdomen and pelvis today  · CT read as multiple right upper lobe opacity concerning for infectious process likely atypical infection. Patient denies any shortness of breath cough or any respiratory symptoms. Leukocytosis noted but remained afebrile  · Given lack of pulmonary symptoms to monitor off of antibiotics. · Procalcitonin negative      VTE Pharmacologic Prophylaxis:   Moderate Risk (Score 3-4) - Pharmacological DVT Prophylaxis Ordered: heparin drip.     Patient Centered Rounds: I performed bedside rounds with nursing staff today.  Discussions with Specialists or Other Care Team Provider: LUCIANA.     Education and Discussions with Family / Patient: Patient declined call to . Total Time Spent on Date of Encounter in care of patient: 35 minutes This time was spent on one or more of the following: performing physical exam; counseling and coordination of care; obtaining or reviewing history; documenting in the medical record; reviewing/ordering tests, medications or procedures; communicating with other healthcare professionals and discussing with patient's family/caregivers. Current Length of Stay: 5 day(s)  Current Patient Status: Inpatient   Certification Statement: The patient will continue to require additional inpatient hospital stay due to fasciotomy closure, pain management, heparin gtt  Discharge Plan: TBD pending surgical closure    Code Status: Level 1 - Full Code    Subjective:   Patient seen and examined. Much more comfortable today. Pain much better controlled. Resting comfortably in bedside chair. Wife present. No events reported overnight. Objective:     Vitals:   Temp (24hrs), Av.5 °F (36.4 °C), Min:97.1 °F (36.2 °C), Max:97.8 °F (36.6 °C)    Temp:  [97.1 °F (36.2 °C)-97.8 °F (36.6 °C)] 97.8 °F (36.6 °C)  HR:  [64-82] 64  Resp:  [17-19] 17  BP: (122-158)/(61-76) 158/61  SpO2:  [96 %-100 %] 100 %  Body mass index is 22.79 kg/m². Input and Output Summary (last 24 hours): Intake/Output Summary (Last 24 hours) at 2023 1409  Last data filed at 2023 1201  Gross per 24 hour   Intake 1235.26 ml   Output 1300 ml   Net -64.74 ml       PHYSICAL EXAM:    Vitals signs reviewed  Constitutional   Awake and cooperative. NAD. Head/Neck   Normocephalic. Atraumatic. HEENT   No scleral icterus. EOMI. Heart   Regular rate and rhythm. No murmers. Lungs   Clear to auscultation bilaterally. Respirations unlaboured. Abdomen   Soft. Nontender. Nondistended.     Skin   Skin color normal. No rashes. Extremities   No deformities. No peripheral edema. Left lower extremity in bandages, clean and dry. Neuro   Alert and oriented. No new deficits. Psych   Mood stable. Affect normal.         Additional Data:     Labs:  Results from last 7 days   Lab Units 08/02/23  0501 08/01/23 0536 07/31/23  0741   WBC Thousand/uL 11.07*   < > 17.67*   HEMOGLOBIN g/dL 12.3   < > 12.7   HEMATOCRIT % 35.8*   < > 37.3   PLATELETS Thousands/uL 536*   < > 499*   NEUTROS PCT %  --   --  63   LYMPHS PCT %  --   --  29   LYMPHO PCT % 20  --   --    MONOS PCT %  --   --  8   MONO PCT % 5  --   --    EOS PCT % 1  --  0    < > = values in this interval not displayed. Results from last 7 days   Lab Units 08/02/23  0501 08/01/23 0536 07/31/23  0741   SODIUM mmol/L 138   < > 136   POTASSIUM mmol/L 3.8   < > 3.5   CHLORIDE mmol/L 106   < > 108   CO2 mmol/L 28   < > 27   BUN mg/dL 10   < > 11   CREATININE mg/dL 0.69   < > 0.64   ANION GAP mmol/L 4   < > 1   CALCIUM mg/dL 8.9   < > 8.5   ALBUMIN g/dL  --   --  3.1*   TOTAL BILIRUBIN mg/dL  --   --  0.66   ALK PHOS U/L  --   --  73   ALT U/L  --   --  57   AST U/L  --   --  38   GLUCOSE RANDOM mg/dL 113   < > 112    < > = values in this interval not displayed. Results from last 7 days   Lab Units 07/29/23  1042   INR  1.03             Results from last 7 days   Lab Units 08/01/23  0536   PROCALCITONIN ng/ml 0.05       Lines/Drains:  Invasive Devices     Peripheral Intravenous Line  Duration           Peripheral IV 07/29/23 Left Arm 3 days    Peripheral IV 08/01/23 Distal;Left;Ventral (anterior) Forearm 1 day                      Imaging: No pertinent imaging reviewed.     Recent Cultures (last 7 days):         Last 24 Hours Medication List:   Current Facility-Administered Medications   Medication Dose Route Frequency Provider Last Rate   • acetaminophen  975 mg Oral Q8H [address] NATALYA Bustos     • amLODIPine  5 mg Oral Daily Luis Felipe Williamson DO     • atorvastatin  40 mg Oral After Dinner Dunia Felton Ramsey PA-C     • [START ON 8/3/2023] cefazolin  1,000 mg Intravenous Once NATALYA Bradley     • chlorhexidine  15 mL Swish & Spit Once NATALYA Bradley     • clopidogrel  75 mg Oral Daily Luis Felipe Weinstein DO     • gabapentin  300 mg Oral TID Tammy Christensen MD     • heparin (porcine)  3-20 Units/kg/hr (Order-Specific) Intravenous Titrated Luis Felipe Weinstein DO 21 Units/kg/hr (08/02/23 1309)   • hydrALAZINE  10 mg Intravenous Q6H PRN Luis Felipe Weinstein DO     • HYDROmorphone  0.5 mg Intravenous Q3H PRN NATALYA De La Cruz     • labetalol  5 mg Intravenous Q15 Min PRN Clarisa Lujan DO     • methocarbamol  500 mg Oral Q6H [facility] NATALYA De La Cruz     • nicotine  1 patch Transdermal Daily Luis Felipe Williamson DO     • oxyCODONE  5 mg Oral Q4H PRN NATALYA De La Cruz      Or   • oxyCODONE  10 mg Oral Q4H PRN NATALYA De La Cruz     • senna  1 tablet Oral HS Luis Felipe Williamson DO     • silver nitrate-potassium nitrate  3 applicator Topical Once Joe Manzo PA-C          Today, Patient Was Seen By: Emory Villarreal DO    **Please Note: This note may have been constructed using a voice recognition system. **

## 2023-08-02 NOTE — PROGRESS NOTES
Pastoral Care Progress Note    2023  Patient: Uday Negron : 1970  Admission Date & Time: 2023 2302  MRN: [id number] CSN: [id number]         23 1500   Clinical Encounter Type   Visited With Patient   Sacramental Encounters   Sacrament of Sick-Anointing Patient declined anointing     Ranjana Sweeney met with the pt and conducted a pastoral visit. Fr offered prayers, blessings and anointing but the pt declined. No further needs were expressed at this time. Chaplains still remain available.

## 2023-08-02 NOTE — ANESTHESIA PREPROCEDURE EVALUATION
Procedure:  DEBRIDEMENT LOWER EXTREMITY ([address] OUT) R LEG FASCIOTOMY WOUND WASHOUT, DEBRIDEMENT, AND POSSIBLE CLOSURE VS VAC PLACEMENT (Right: Leg Lower)  47 y/o M presents with subacute Right limb ischemia w/ c/o 2 weeks of pain and numbness in setting of Right SFA occlusion     S/P Right SFA cut-down, Agram, SFA PTA/stent, and Right anterolateral fasciotomy 7/29     Plan:  Plan for OR today for R fasciotomy washout and possible closure vs wound VAC placement. Relevant Problems   CARDIO   (+) Essential hypertension   (+) Hyperlipidemia, mixed      HEMATOLOGY   (+) Acute blood loss anemia   Prev easy intubation     Recent labs personally reviewed:  Lab Results   Component Value Date    WBC 11.07 (H) 08/02/2023    HGB 12.3 08/02/2023     (H) 08/02/2023     Lab Results   Component Value Date    K 3.8 08/02/2023    BUN 10 08/02/2023    CREATININE 0.69 08/02/2023     Lab Results   Component Value Date    PTT 86 (H) 08/03/2023      Lab Results   Component Value Date    INR 1.03 07/29/2023       Lab Results   Component Value Date    HGBA1C 5.8 (H) 07/21/2023       Type and Screen:  AB    TTE 2023  Interpretation Summary       •  Left Ventricle: Left ventricular cavity size is normal. Wall thickness is normal. The left ventricular ejection fraction is 65%. Systolic function is normal. Wall motion is normal. Diastolic function is normal.  •  Atrial Septum: No patent foramen ovale detected using color flow Doppler at rest.    Physical Exam    Airway    Mallampati score: II  TM Distance: >3 FB  Neck ROM: full     Dental       Cardiovascular      Pulmonary      Other Findings        Anesthesia Plan  ASA Score- 3     Anesthesia Type- general with ASA Monitors. Additional Monitors:   Airway Plan: LMA. Plan Factors-Exercise tolerance (METS): >4 METS. Chart reviewed. Existing labs reviewed. Patient summary reviewed. Induction- intravenous.     Postoperative Plan-     Informed Consent- Anesthetic plan and risks discussed with patient. I personally reviewed this patient with the CRNA. Discussed and agreed on the Anesthesia Plan with the CRNA. Jenifer Pelaez

## 2023-08-02 NOTE — ASSESSMENT & PLAN NOTE
Acute blood loss anemia related to surgery, open fasciotomy wounds with full ongoing anticoagulation with Heparin gtt and Plavix therapy    Plan:  Monitor

## 2023-08-02 NOTE — PROGRESS NOTES
[address] [place] [address]  Progress Note  Name: Zeynep Carrera  MRN: [id number]  Unit/Bed#: [facility] 503-01 I Date of Admission: 7/28/2023   Date of Service: 8/2/2023 I Hospital Day: 5    Assessment/Plan   * Arterial occlusion  Assessment & Plan  45 y/o M presents with subacute Right limb ischemia w/ c/o 2 weeks of pain and numbness in setting of Right SFA occlusion    S/P Right SFA cut-down, Agram, SFA PTA/stent, and Right anterolateral fasciotomy 7/29    Plan:  Continue Heparin gtt, transition to coumadin prior to discharge following fasciotomy closure  Continue Plavix, will d/c ASA and plan for Plavix and coumadin therapy at discharge  Start statin therapy, lipitor 40mg daily  Daily Fasciotomy dressing changes per Vascular team, to consider return to OR for washout and possible fasciotomy closure this week. APS following for pain control  Multipodus boot   PT/OT  Incidental pancreatic lesion and adrenal lesion noted on CT, outpatient follow-up with Surgical Oncology  Case management for dispo planning    Acute blood loss anemia  Assessment & Plan  Acute blood loss anemia related to surgery, open fasciotomy wounds with full ongoing anticoagulation with Heparin gtt and Plavix therapy    Plan:  Monitor         Subjective:  Pt doing well, pain better controlled today    Vitals:  /76 (BP Location: Right arm)   Pulse 64   Temp 97.6 °F (36.4 °C) (Oral)   Resp 19   Ht 5' 9" (1.753 m)   Wt 70 kg (154 lb 4.8 oz)   SpO2 98%   BMI 22.79 kg/m²     I/Os:  I/O last 3 completed shifts: In: 2578.5 [P.O.:2062; I.V.:516.5]  Out: 1150 [ENWCQ:3050]  I/O this shift:  In: 381.7 [P.O.:220;  I.V.:161.7]  Out: 1300 [Urine:1300]    Lab Results and Cultures:   Lab Results   Component Value Date    WBC 11.07 (H) 08/02/2023    HGB 12.3 08/02/2023    HCT 35.8 (L) 08/02/2023    MCV 81 (L) 08/02/2023     (H) 08/02/2023     Lab Results   Component Value Date    CALCIUM 8.9 08/02/2023    K 3.8 08/02/2023    CO2 28 08/02/2023     08/02/2023    BUN 10 08/02/2023    CREATININE 0.69 08/02/2023     Lab Results   Component Value Date    INR 1.03 07/29/2023    INR 1.08 07/28/2023    INR 1.08 10/01/2018    PROTIME 13.7 07/29/2023    PROTIME 13.8 07/28/2023    PROTIME 13.9 10/01/2018        Blood Culture: No results found for: "BLOODCX",   Urinalysis: No results found for: "COLORU", "CLARITYU", "SPECGRAV", "PHUR", "LEUKOCYTESUR", "NITRITE", "PROTEINUA", "GLUCOSEU", "Clerance Matar", "BILIRUBINUR", "BLOODU",   Urine Culture: No results found for: "URINECX",   Wound Culure: No results found for: "WOUNDCULT"    Medications:  Current Facility-Administered Medications   Medication Dose Route Frequency   • acetaminophen (TYLENOL) tablet 975 mg  975 mg Oral Q8H [facility]   • amLODIPine (NORVASC) tablet 5 mg  5 mg Oral Daily   • atorvastatin (LIPITOR) tablet 40 mg  40 mg Oral After Dinner   • clopidogrel (PLAVIX) tablet 75 mg  75 mg Oral Daily   • gabapentin (NEURONTIN) capsule 300 mg  300 mg Oral TID   • heparin (porcine) 25,000 units in 0.45% NaCl 250 mL infusion (premix)  3-20 Units/kg/hr (Order-Specific) Intravenous Titrated   • hydrALAZINE (APRESOLINE) injection 10 mg  10 mg Intravenous Q6H PRN   • HYDROmorphone (DILAUDID) injection 0.5 mg  0.5 mg Intravenous Q3H PRN   • labetalol (NORMODYNE) injection 5 mg  5 mg Intravenous Q15 Min PRN   • methocarbamol (ROBAXIN) tablet 500 mg  500 mg Oral Q6H [facility]   • nicotine (NICODERM CQ) 14 mg/24hr TD 24 hr patch 1 patch  1 patch Transdermal Daily   • oxyCODONE (ROXICODONE) IR tablet 5 mg  5 mg Oral Q4H PRN    Or   • oxyCODONE (ROXICODONE) immediate release tablet 10 mg  10 mg Oral Q4H PRN   • senna (SENOKOT) tablet 8.6 mg  1 tablet Oral HS   • silver nitrate-potassium nitrate (ARZOL SILVER NITRATE) 42-64 % applicator 3 applicator  3 applicator Topical Once         Physical Exam:    General appearance: alert and oriented, in no acute distress  Lungs: clear to auscultation bilaterally  Heart: S1, S2 normal  Abdomen: soft, non-tender; bowel sounds normal; no masses,  no organomegaly  Extremities: Right foot warm, M/S grossly intact w/ decrease ankle flexion, +edema    Wound/Incision:  RLE dressing clean, dry, and intact    Pulse exam:  DP: Right: doppler signal Left: doppler signal        Korin Dawkins PA-C  8/2/2023

## 2023-08-02 NOTE — ASSESSMENT & PLAN NOTE
· CT read as multiple right upper lobe opacity concerning for infectious process likely atypical infection. Patient denies any shortness of breath cough or any respiratory symptoms. Leukocytosis noted but remained afebrile  · Given lack of pulmonary symptoms to monitor off of antibiotics.     · Procalcitonin negative

## 2023-08-02 NOTE — PHYSICAL THERAPY NOTE
Physical Therapy Cancellation Note         08/02/23 0955   PT Last Visit   PT Visit Date 08/02/23   Note Type   Note Type Treatment   Pain Assessment   Pain Assessment Tool FLACC   Pain Score   (states pain to be improving)   Pain Location/Orientation Orientation: Right;Location: Leg   Pain Rating: FLACC (Rest) - Face 0   Pain Rating: FLACC (Rest) - Legs 0   Pain Rating: FLACC (Rest) - Activity 0   Pain Rating: FLACC (Rest) - Cry 0   Pain Rating: FLACC (Rest) - Consolability 0   Score: FLACC (Rest) 0   Pain Rating: FLACC (Activity) - Face 0   Pain Rating: FLACC (Activity) - Legs 0   Pain Rating: FLACC (Activity) - Activity 0   Pain Rating: FLACC (Activity) - Cry 0   Pain Rating: FLACC (Activity) - Consolability 0   Score: FLACC (Activity) 0   Restrictions/Precautions   Weight Bearing Precautions Per Order   (no formal WB restriction; per patient he was told to keep multi-podus boot donned and not to stand on it so he is maintaining R LE NWB)   Braces or Orthoses   (mulitpodus boot R LE)   Other Precautions Pain; Fall Risk;Telemetry;Multiple lines   General   Chart Reviewed Yes   Additional Pertinent History 46 y.o. male admitted to [facility] on 7/28/2023 with symptoms of: R LE pain x 2 weeks. Patient with acute occlusion of the mid and distal superficial femoral artery and distal anteriortibial artery. He is s/p SFA cutdown, balloon angioplasty and stenting, and right leg fasciotomy on 7/29. No formal WBS noted. Patient maintained R LE NWB. Response to Previous Treatment Patient with no complaints from previous session. Family/Caregiver Present No   Cognition   Overall Cognitive Status WFL   Comments despite extensive education regarding role of PT in acute care setting, patient is adamant that he does not need help walking. that he is fully capable.    Subjective   Subjective "I know how to walk with crutches"   Bed Mobility   Supine to Sit 6  Modified independent Additional items HOB elevated; Increased time required   Sit to Supine Unable to assess   Additional Comments post treatment patient OOB in recliner with multipodus boot donned and R LE elevated on pillow   Transfers   Sit to Stand 6  Modified independent   Additional items Increased time required;Verbal cues   Stand to Sit 6  Modified independent   Additional items Increased time required;Verbal cues   Toilet transfer 6  Modified independent   Additional items Increased time required;Commode   Additional Comments sit<-->stand with use of crutches   Ambulation/Elevation   Gait pattern Excessively slow;Decreased R stance   Gait Assistance 5  Supervision   Additional items Assist x 1;Verbal cues  (therapist managing IV pole + DASH)   Assistive Device Crutches   Distance 90 feet x 2   Ambulation/Elevation Additional Comments patient using 2 point gait pattern with use of axillary crutches. encouraged reduced gait speed and decreased stride length for improved safety. patient declined stair trial- stating that he has been using crutches for 2 weeks prior to this hospitalization and felt comfortable   Balance   Static Sitting Good   Static Standing Fair +   Ambulatory Fair   Endurance Deficit   Endurance Deficit Yes   Endurance Deficit Description maintaining R LE NWB   Activity Tolerance   Activity Tolerance Patient tolerated treatment well   Nurse Made Aware radha to see per RN Gabby   Assessment   Prognosis Good   Problem List Impaired balance;Decreased mobility;Pain;Decreased skin integrity   Assessment PT initiated treatment session in order to assist patient in achieving goals to improve transfers, gait training, and overall activity tolerance. Patient remains with open fasciotomy to R LE with multi-podus boot and continues to maintain R LE NWB. He expresses that if he did not have multiple lines (continuous pulse-ox, telemetry monitor, IV heparin) he would be able to ambulate without assistance.  Today, with therapist managing lines patient was mod-I for bed mobility and sit<-->stand transfers and S level for ambulation. With use of crutches patient walked 90 feet x 2 with 1 standing rest break. He did not demonstrate LOB. During education provided to patient he was made aware that after his fasciotomy is closed the WB will be up to physician (MARÍA MICHELE). He plans on using crutches either way. Patient overall dismissive toward participation and education in therapy and states that he does not require follow up this admission. He does not present with questions/concerns regarding mobility this admission or at d/c. He will be d/c from PT caseload and is recommended for OPPT f/u for R LE as appropriate per physician. Also encouraged ongoing ambulation with S of RN staff/restorative therapist.   Goals   Patient Goals to have his additional surgery and go home   LTG Expiration Date 08/14/23   PT Treatment Day 1   Plan   Progress Discontinue PT   Recommendation   PT Discharge Recommendation Home with outpatient rehabilitation  (OPPT for R LE when appropriate per physician)   Equipment Recommended Crutches  (has crutches)   Additional Comments Patient participated in 10 minutes of education regarding mobility this admission and upon d/c. This included the following: confirmation of home set up for d/c (one level, 3 JEFF, support (spouse is home all of the time), dme for d/c (has crutches, is requesting commode), f/u with OPPT services, and recommendation to change positions at least 1x/hour (even if sit<-->stand) and to ambulate 3-5x within home to continue participation in mobility upon d/c.    AM-PAC Basic Mobility Inpatient   Turning in Flat Bed Without Bedrails 4   Lying on Back to Sitting on Edge of Flat Bed Without Bedrails 4   Moving Bed to Chair 4   Standing Up From Chair Using Arms 4   Walk in Room 4   Climb 3-5 Stairs With Railing 3   Basic Mobility Inpatient Raw Score 23   Basic Mobility Standardized Score 50.88 Highest Level Of Mobility   JH-HLM Goal 7: Walk 25 feet or more   JH-HLM Achieved 7: Walk 25 feet or more     The patient's AM-PAC Basic Mobility Inpatient Standardized Score is greater than 42.9, suggesting this patient may benefit from discharge to home. Please also refer to the recommendation of the Physical Therapist for safe discharge planning.     VISHNU JOYA PT, DPT

## 2023-08-03 ENCOUNTER — ANESTHESIA (INPATIENT)
Dept: PERIOP | Facility: HOSPITAL | Age: 53
End: 2023-08-03
Payer: COMMERCIAL

## 2023-08-03 PROBLEM — D72.829 LEUKOCYTOSIS (LEUCOCYTOSIS): Status: RESOLVED | Noted: 2018-10-03 | Resolved: 2023-08-03

## 2023-08-03 LAB
APTT PPP: 86 SECONDS (ref 23–37)
GLUCOSE SERPL-MCNC: 81 MG/DL (ref 65–140)

## 2023-08-03 PROCEDURE — 0JCL0ZZ EXTIRPATION OF MATTER FROM RIGHT UPPER LEG SUBCUTANEOUS TISSUE AND FASCIA, OPEN APPROACH: ICD-10-PCS | Performed by: SURGERY

## 2023-08-03 PROCEDURE — 99024 POSTOP FOLLOW-UP VISIT: CPT | Performed by: NURSE PRACTITIONER

## 2023-08-03 PROCEDURE — 13160 SEC CLSR SURG WND/DEHSN XTN: CPT | Performed by: SURGERY

## 2023-08-03 PROCEDURE — 85730 THROMBOPLASTIN TIME PARTIAL: CPT | Performed by: SURGERY

## 2023-08-03 PROCEDURE — 0JQL0ZZ REPAIR RIGHT UPPER LEG SUBCUTANEOUS TISSUE AND FASCIA, OPEN APPROACH: ICD-10-PCS | Performed by: SURGERY

## 2023-08-03 PROCEDURE — 99232 SBSQ HOSP IP/OBS MODERATE 35: CPT | Performed by: INTERNAL MEDICINE

## 2023-08-03 PROCEDURE — 82948 REAGENT STRIP/BLOOD GLUCOSE: CPT

## 2023-08-03 RX ORDER — SODIUM CHLORIDE 9 MG/ML
INJECTION, SOLUTION INTRAVENOUS AS NEEDED
Status: DISCONTINUED | OUTPATIENT
Start: 2023-08-03 | End: 2023-08-03 | Stop reason: HOSPADM

## 2023-08-03 RX ORDER — SODIUM CHLORIDE, SODIUM LACTATE, POTASSIUM CHLORIDE, CALCIUM CHLORIDE 600; 310; 30; 20 MG/100ML; MG/100ML; MG/100ML; MG/100ML
INJECTION, SOLUTION INTRAVENOUS CONTINUOUS PRN
Status: DISCONTINUED | OUTPATIENT
Start: 2023-08-03 | End: 2023-08-03

## 2023-08-03 RX ORDER — ONDANSETRON 2 MG/ML
4 INJECTION INTRAMUSCULAR; INTRAVENOUS ONCE AS NEEDED
Status: DISCONTINUED | OUTPATIENT
Start: 2023-08-03 | End: 2023-08-03 | Stop reason: HOSPADM

## 2023-08-03 RX ORDER — HYDROMORPHONE HCL/PF 1 MG/ML
0.5 SYRINGE (ML) INJECTION
Status: DISCONTINUED | OUTPATIENT
Start: 2023-08-03 | End: 2023-08-03 | Stop reason: HOSPADM

## 2023-08-03 RX ORDER — ONDANSETRON 2 MG/ML
INJECTION INTRAMUSCULAR; INTRAVENOUS AS NEEDED
Status: DISCONTINUED | OUTPATIENT
Start: 2023-08-03 | End: 2023-08-03

## 2023-08-03 RX ORDER — FENTANYL CITRATE/PF 50 MCG/ML
50 SYRINGE (ML) INJECTION
Status: DISCONTINUED | OUTPATIENT
Start: 2023-08-03 | End: 2023-08-03 | Stop reason: HOSPADM

## 2023-08-03 RX ORDER — MIDAZOLAM HYDROCHLORIDE 2 MG/2ML
INJECTION, SOLUTION INTRAMUSCULAR; INTRAVENOUS AS NEEDED
Status: DISCONTINUED | OUTPATIENT
Start: 2023-08-03 | End: 2023-08-03

## 2023-08-03 RX ORDER — CEFAZOLIN SODIUM 1 G/3ML
INJECTION, POWDER, FOR SOLUTION INTRAMUSCULAR; INTRAVENOUS AS NEEDED
Status: DISCONTINUED | OUTPATIENT
Start: 2023-08-03 | End: 2023-08-03

## 2023-08-03 RX ORDER — FENTANYL CITRATE 50 UG/ML
INJECTION, SOLUTION INTRAMUSCULAR; INTRAVENOUS AS NEEDED
Status: DISCONTINUED | OUTPATIENT
Start: 2023-08-03 | End: 2023-08-03

## 2023-08-03 RX ORDER — HYDRALAZINE HYDROCHLORIDE 20 MG/ML
10 INJECTION INTRAMUSCULAR; INTRAVENOUS ONCE
Status: COMPLETED | OUTPATIENT
Start: 2023-08-03 | End: 2023-08-03

## 2023-08-03 RX ORDER — PROPOFOL 10 MG/ML
INJECTION, EMULSION INTRAVENOUS AS NEEDED
Status: DISCONTINUED | OUTPATIENT
Start: 2023-08-03 | End: 2023-08-03

## 2023-08-03 RX ORDER — LIDOCAINE HYDROCHLORIDE 20 MG/ML
INJECTION, SOLUTION EPIDURAL; INFILTRATION; INTRACAUDAL; PERINEURAL AS NEEDED
Status: DISCONTINUED | OUTPATIENT
Start: 2023-08-03 | End: 2023-08-03

## 2023-08-03 RX ORDER — LABETALOL HYDROCHLORIDE 5 MG/ML
10 INJECTION, SOLUTION INTRAVENOUS
Status: DISCONTINUED | OUTPATIENT
Start: 2023-08-03 | End: 2023-08-03 | Stop reason: HOSPADM

## 2023-08-03 RX ADMIN — HEPARIN SODIUM 21.1 UNITS/KG/HR: 10000 INJECTION, SOLUTION INTRAVENOUS at 09:24

## 2023-08-03 RX ADMIN — METHOCARBAMOL 500 MG: 500 TABLET ORAL at 13:33

## 2023-08-03 RX ADMIN — CEFAZOLIN 1000 MG: 1 INJECTION, POWDER, FOR SOLUTION INTRAMUSCULAR; INTRAVENOUS at 16:19

## 2023-08-03 RX ADMIN — FENTANYL CITRATE 50 MCG: 50 INJECTION, SOLUTION INTRAMUSCULAR; INTRAVENOUS at 16:50

## 2023-08-03 RX ADMIN — Medication 10 MG: at 17:13

## 2023-08-03 RX ADMIN — HYDROMORPHONE HYDROCHLORIDE 0.5 MG: 1 INJECTION, SOLUTION INTRAMUSCULAR; INTRAVENOUS; SUBCUTANEOUS at 20:09

## 2023-08-03 RX ADMIN — CLOPIDOGREL BISULFATE 75 MG: 75 TABLET ORAL at 08:16

## 2023-08-03 RX ADMIN — MIDAZOLAM 2 MG: 1 INJECTION INTRAMUSCULAR; INTRAVENOUS at 16:02

## 2023-08-03 RX ADMIN — ONDANSETRON 4 MG: 2 INJECTION INTRAMUSCULAR; INTRAVENOUS at 16:18

## 2023-08-03 RX ADMIN — FENTANYL CITRATE 50 MCG: 50 INJECTION, SOLUTION INTRAMUSCULAR; INTRAVENOUS at 16:19

## 2023-08-03 RX ADMIN — ATORVASTATIN CALCIUM 40 MG: 40 TABLET, FILM COATED ORAL at 17:50

## 2023-08-03 RX ADMIN — GABAPENTIN 300 MG: 300 CAPSULE ORAL at 21:32

## 2023-08-03 RX ADMIN — GABAPENTIN 300 MG: 300 CAPSULE ORAL at 08:16

## 2023-08-03 RX ADMIN — ACETAMINOPHEN 975 MG: 325 TABLET, FILM COATED ORAL at 21:32

## 2023-08-03 RX ADMIN — FENTANYL CITRATE 25 MCG: 50 INJECTION, SOLUTION INTRAMUSCULAR; INTRAVENOUS at 16:24

## 2023-08-03 RX ADMIN — PROPOFOL 40 MG: 10 INJECTION, EMULSION INTRAVENOUS at 16:25

## 2023-08-03 RX ADMIN — METHOCARBAMOL 500 MG: 500 TABLET ORAL at 17:50

## 2023-08-03 RX ADMIN — AMLODIPINE BESYLATE 5 MG: 5 TABLET ORAL at 08:16

## 2023-08-03 RX ADMIN — PROPOFOL 160 MG: 10 INJECTION, EMULSION INTRAVENOUS at 16:10

## 2023-08-03 RX ADMIN — FENTANYL CITRATE 25 MCG: 50 INJECTION, SOLUTION INTRAMUSCULAR; INTRAVENOUS at 16:10

## 2023-08-03 RX ADMIN — METHOCARBAMOL 500 MG: 500 TABLET ORAL at 05:00

## 2023-08-03 RX ADMIN — SENNOSIDES 8.6 MG: 8.6 TABLET, FILM COATED ORAL at 21:32

## 2023-08-03 RX ADMIN — NICOTINE 1 PATCH: 14 PATCH, EXTENDED RELEASE TRANSDERMAL at 08:16

## 2023-08-03 RX ADMIN — HYDRALAZINE HYDROCHLORIDE 10 MG: 20 INJECTION, SOLUTION INTRAMUSCULAR; INTRAVENOUS at 17:05

## 2023-08-03 RX ADMIN — SODIUM CHLORIDE, SODIUM LACTATE, POTASSIUM CHLORIDE, AND CALCIUM CHLORIDE: .6; .31; .03; .02 INJECTION, SOLUTION INTRAVENOUS at 16:05

## 2023-08-03 RX ADMIN — ACETAMINOPHEN 975 MG: 325 TABLET, FILM COATED ORAL at 05:00

## 2023-08-03 RX ADMIN — LIDOCAINE HYDROCHLORIDE 50 MG: 20 INJECTION, SOLUTION EPIDURAL; INFILTRATION; INTRACAUDAL at 16:10

## 2023-08-03 RX ADMIN — OXYCODONE HYDROCHLORIDE 10 MG: 10 TABLET ORAL at 17:50

## 2023-08-03 RX ADMIN — CHLORHEXIDINE GLUCONATE 15 ML: 1.2 SOLUTION ORAL at 08:20

## 2023-08-03 NOTE — ANESTHESIA POSTPROCEDURE EVALUATION
Post-Op Assessment Note    CV Status:  Stable    Pain management: adequate     Mental Status:  Awake   Hydration Status:  Stable   PONV Controlled:  Controlled   Airway Patency:  Patent      Post Op Vitals Reviewed: Yes      Staff: Anesthesiologist, CRNA         No notable events documented.     BP   194/78   Temp 98.3 °F (36.8 °C) (08/03/23 1657)    Pulse 67 (08/03/23 1657)   Resp   14   SpO2   98

## 2023-08-03 NOTE — QUICK NOTE
Post Op Check Note   Aaron Fermin 46 y.o. male MRN: [id number]  Unit/Bed#: OR POOL Encounter: [id number]    ASSESSMENT:  Aaron Christensen is a 46 y.o. male who is status post debridement, fasciotomy closure. Continue pain management. PT in AM.  DC planning. Subjective: Patient reports he has some pain. Is anxious to move forward with discharge planning    Physical Exam:  GEN: NAD  CV: RRR  Lung: Normal effort  Ab: Soft, NT/ND  Neuro: A+Ox3  Incisions: dressing c/d/i    Blood pressure 141/75, pulse 66, temperature 98 °F (36.7 °C), temperature source Oral, resp. rate 17, height 5' 9" (1.753 m), weight 69.9 kg (154 lb), SpO2 99 %. ,Body mass index is 22.74 kg/m². Intake/Output Summary (Last 24 hours) at 8/3/2023 1623  Last data filed at 8/3/2023 0601  Gross per 24 hour   Intake 460 ml   Output 625 ml   Net -165 ml       Invasive Devices     Peripheral Intravenous Line  Duration           Peripheral IV 08/01/23 Distal;Ventral (anterior); Right Forearm 2 days          Airway  Duration           Supraglottic Airway LMA 4 <1 day                VTE Pharmacologic Prophylaxis: Heparin Alcohol use disorder, severe, dependence

## 2023-08-03 NOTE — PROGRESS NOTES
[address] [place] [address]  Progress Note  Name: Marty Arellano  MRN: [id number]  [facility] Date of Admission: 7/28/2023   Date of Service: 8/3/2023 I Hospital Day: 6    Assessment/Plan   * Arterial occlusion  Assessment & Plan  · Presented with 2 weeks of right lower extremity pain/claudication  · Articular Doppler ultrasound showing acute occlusion of the mid/distal superficial femoral artery. Occlusion of the distal anterior tibial artery. · Transferred to [address] [place] [address] for evaluation    · Status post right SFA cutdown, balloon angioplasty, stenting, right leg fasciotomy on 7/29  · Primary management per vascular surgery team  · Continue on heparin drip, aspirin, statin  · Multi-Podus boots  · Local wound care  · Wound debridement, washout, and potential fasciotomy closure today 8/30      Hyperlipidemia, mixed  Assessment & Plan  · Continue statin    Essential hypertension  Assessment & Plan  · Controlled on amlodipine 5 mg daily; continue to monitor    Tobacco dependence  Assessment & Plan  · NRT ordered while inpatient  · Cessation advised    Abnormal chest CT  Assessment & Plan    · CT read as multiple right upper lobe opacity concerning for infectious process likely atypical infection. Patient denies any shortness of breath cough or any respiratory symptoms. Leukocytosis noted but remained afebrile  · Given lack of pulmonary symptoms to monitor off of antibiotics. · Procalcitonin negative    Leukocytosis (leucocytosis)-resolved as of 8/3/2023  Assessment & Plan  · White count noted at 17 K on 7/31  · Patient without any infectious symptoms and afebrile; this is likely reactive secondary to surgery  · Procalcitonin negative  · Continue to monitor off antibiotics  · Improved      VTE Pharmacologic Prophylaxis:   Moderate Risk (Score 3-4) - Pharmacological DVT Prophylaxis Ordered: heparin drip.     Patient Centered Rounds: I performed bedside rounds with nursing staff today. Discussions with Specialists or Other Care Team Provider: LUCIANA.     Education and Discussions with Family / Patient: Patient declined call to . Total Time Spent on Date of Encounter in care of patient: 35 minutes This time was spent on one or more of the following: performing physical exam; counseling and coordination of care; obtaining or reviewing history; documenting in the medical record; reviewing/ordering tests, medications or procedures; communicating with other healthcare professionals and discussing with patient's family/caregivers. Current Length of Stay: 6 day(s)  Current Patient Status: Inpatient   Certification Statement: The patient will continue to require additional inpatient hospital stay due to fasciotomy closure, pain management, heparin gtt  Discharge Plan: TBD pending surgical closure    Code Status: Level 1 - Full Code    Subjective:   Patient seen and examined. No new events overnight. Pain is controlled. No new complaints. Objective:     Vitals:   Temp (24hrs), Av.9 °F (36.6 °C), Min:97.8 °F (36.6 °C), Max:98 °F (36.7 °C)    Temp:  [97.8 °F (36.6 °C)-98 °F (36.7 °C)] 98 °F (36.7 °C)  HR:  [66] 66  Resp:  [17-18] 17  BP: (122-145)/(56-80) 141/75  SpO2:  [99 %-100 %] 99 %  Body mass index is 22.74 kg/m². Input and Output Summary (last 24 hours): Intake/Output Summary (Last 24 hours) at 8/3/2023 1406  Last data filed at 8/3/2023 0601  Gross per 24 hour   Intake 760.76 ml   Output 625 ml   Net 135.76 ml       PHYSICAL EXAM:    Vitals signs reviewed  Constitutional   Awake and cooperative. NAD. Head/Neck   Normocephalic. Atraumatic. HEENT   No scleral icterus. EOMI. Heart   Regular rate and rhythm. No murmers. Lungs   Clear to auscultation bilaterally. Respirations unlaboured. Abdomen   Soft. Nontender. Nondistended. Skin   Skin color normal. No rashes. Extremities   No deformities. No peripheral edema.   Left lower extremity in bandages, clean and dry. Neuro   Alert and oriented. No new deficits. Psych   Mood stable. Affect normal.         Additional Data:     Labs:  Results from last 7 days   Lab Units 08/02/23  0501 08/01/23 0536 07/31/23  0741   WBC Thousand/uL 11.07*   < > 17.67*   HEMOGLOBIN g/dL 12.3   < > 12.7   HEMATOCRIT % 35.8*   < > 37.3   PLATELETS Thousands/uL 536*   < > 499*   NEUTROS PCT %  --   --  63   LYMPHS PCT %  --   --  29   LYMPHO PCT % 20  --   --    MONOS PCT %  --   --  8   MONO PCT % 5  --   --    EOS PCT % 1  --  0    < > = values in this interval not displayed. Results from last 7 days   Lab Units 08/02/23  0501 08/01/23 0536 07/31/23  0741   SODIUM mmol/L 138   < > 136   POTASSIUM mmol/L 3.8   < > 3.5   CHLORIDE mmol/L 106   < > 108   CO2 mmol/L 28   < > 27   BUN mg/dL 10   < > 11   CREATININE mg/dL 0.69   < > 0.64   ANION GAP mmol/L 4   < > 1   CALCIUM mg/dL 8.9   < > 8.5   ALBUMIN g/dL  --   --  3.1*   TOTAL BILIRUBIN mg/dL  --   --  0.66   ALK PHOS U/L  --   --  73   ALT U/L  --   --  57   AST U/L  --   --  38   GLUCOSE RANDOM mg/dL 113   < > 112    < > = values in this interval not displayed. Results from last 7 days   Lab Units 07/29/23  1042   INR  1.03             Results from last 7 days   Lab Units 08/01/23  0536   PROCALCITONIN ng/ml 0.05       Lines/Drains:  Invasive Devices     Peripheral Intravenous Line  Duration           Peripheral IV 08/01/23 Distal;Ventral (anterior); Right Forearm 2 days                      Imaging: No pertinent imaging reviewed.     Recent Cultures (last 7 days):         Last 24 Hours Medication List:   Current Facility-Administered Medications   Medication Dose Route Frequency Provider Last Rate   • acetaminophen  975 mg Oral [facility] NATALYA Roman     • amLODIPine  5 mg Oral Daily Luis Felipe Williamson DO     • atorvastatin  40 mg Oral After Dinner Vivian Dunn PA-C     • cefazolin  1,000 mg Intravenous Once NATALYA Terrazas     • clopidogrel  75 mg Oral Daily Luis Felipe Ramachandran DO     • gabapentin  300 mg Oral TID Danis Potter MD     • heparin (porcine)  3-20 Units/kg/hr (Order-Specific) Intravenous Titrated Luis Felipe Williamson DO 21.1 Units/kg/hr (08/03/23 9093)   • hydrALAZINE  10 mg Intravenous Q6H PRN Luis Felipe Ramachandran DO     • HYDROmorphone  0.5 mg Intravenous Q3H PRN Jenita Listen, CRNP     • labetalol  5 mg Intravenous Q15 Min PRN Alonzo Katie, DO     • methocarbamol  500 mg Oral Q6H [address] Jenita Listen, CRNP     • nicotine  1 patch Transdermal Daily Luis Felipe Williamson DO     • oxyCODONE  5 mg Oral Q4H PRN Jenita Listen, CRNP      Or   • oxyCODONE  10 mg Oral Q4H PRN Jenita Listen, CRNP     • senna  1 tablet Oral HS Luis Felipe Williamson DO     • silver nitrate-potassium nitrate  3 applicator Topical Once Jennifer Fitzgerald PA-C          Today, Patient Was Seen By: Chiquis Villarreal DO    **Please Note: This note may have been constructed using a voice recognition system. **

## 2023-08-03 NOTE — ASSESSMENT & PLAN NOTE
Acute blood loss anemia related to surgery, open fasciotomy wounds with full ongoing anticoagulation with Heparin gtt and Plavix therapy    Plan:  Most recent Hgb 12.3. Continue to monitor.

## 2023-08-03 NOTE — ASSESSMENT & PLAN NOTE
45 y/o M presents with subacute Right limb ischemia w/ c/o 2 weeks of pain and numbness in setting of Right SFA occlusion    S/P Right SFA cut-down, Agram, SFA PTA/stent, and Right anterolateral fasciotomy 7/29    Plan:  Plan for OR today for R fasciotomy washout and possible closure vs wound VAC placement.   Continue Heparin gtt, transition to coumadin prior to discharge following fasciotomy closure  Continue Plavix and plan for Plavix and coumadin therapy at discharge  Continue statin therapy, lipitor 40mg daily  APS following for pain control  Multipodus boot to RLE  PT/OT  Incidental pancreatic lesion and adrenal lesion noted on CT; outpatient follow-up with Surgical Oncology  Case management for dispo planning

## 2023-08-03 NOTE — RESTORATIVE TECHNICIAN NOTE
Restorative Technician Note      Patient Name: Norah Rojas     Note Type: Mobility  Patient Position Upon Consult: Supine  Activity Performed: Ambulated  Assistive Device: Roller walker  Patient Position at End of Consult:  Other (comment) (Aurora Macias)

## 2023-08-03 NOTE — PROGRESS NOTES
[address] [place] [address]  Progress Note  Name: Jacobo Cook  MRN: [id number]  [facility] Date of Admission: 7[facility]28/2023   Date of Service: 8/3/2023 I Hospital Day: 6    Assessment/Plan   * Arterial occlusion  Assessment & Plan  45 y/o M presents with subacute Right limb ischemia w/ c/o 2 weeks of pain and numbness in setting of Right SFA occlusion    S/P Right SFA cut-down, Agram, SFA PTA/stent, and Right anterolateral fasciotomy 7/29    Plan:  Plan for OR today for R fasciotomy washout and possible closure vs wound VAC placement. Continue Heparin gtt, transition to coumadin prior to discharge following fasciotomy closure  Continue Plavix and plan for Plavix and coumadin therapy at discharge  Continue statin therapy, lipitor 40mg daily  APS following for pain control  Multipodus boot to RLE  PT/OT  Incidental pancreatic lesion and adrenal lesion noted on CT; outpatient follow-up with Surgical Oncology  Case management for dispo planning    Acute blood loss anemia  Assessment & Plan  Acute blood loss anemia related to surgery, open fasciotomy wounds with full ongoing anticoagulation with Heparin gtt and Plavix therapy    Plan:  Most recent Hgb 12.3. Continue to monitor. Subjective:  Pt reports RLE pain is improved since starting routine pain medication per APS recommendations. He currently receives routine tylenol, neurontin and robaxin. Oxycodone prn has not been used since initial order on 8/1/23. He reports mild improvement in ongoing limited movement of RLE. Otherwise, pt reports feeling well and denies any further complaints at this time. Vitals:  /75 (BP Location: Left arm)   Pulse 66   Temp 98 °F (36.7 °C) (Oral)   Resp 17   Ht 5' 9" (1.753 m)   Wt 69.9 kg (154 lb)   SpO2 99%   BMI 22.74 kg/m²     I/Os:  I/O last 3 completed shifts: In: 1440.3 [P.O.:1100; I.V.:340.3]  Out: 1925 [Urine:1925]  No intake/output data recorded.     Lab Results and Cultures:   Lab Results   Component Value Date    WBC 11.07 (H) 08/02/2023    HGB 12.3 08/02/2023    HCT 35.8 (L) 08/02/2023    MCV 81 (L) 08/02/2023     (H) 08/02/2023     Lab Results   Component Value Date    CALCIUM 8.9 08/02/2023    K 3.8 08/02/2023    CO2 28 08/02/2023     08/02/2023    BUN 10 08/02/2023    CREATININE 0.69 08/02/2023     Lab Results   Component Value Date    INR 1.03 07/29/2023    INR 1.08 07/28/2023    INR 1.08 10/01/2018    PROTIME 13.7 07/29/2023    PROTIME 13.8 07/28/2023    PROTIME 13.9 10/01/2018        Medications:  Current Facility-Administered Medications   Medication Dose Route Frequency   • acetaminophen (TYLENOL) tablet 975 mg  975 mg Oral Q8H [address]   • amLODIPine (NORVASC) tablet 5 mg  5 mg Oral Daily   • atorvastatin (LIPITOR) tablet 40 mg  40 mg Oral After Dinner   • ceFAZolin (ANCEF) IVPB (premix in dextrose) 1,000 mg 50 mL  1,000 mg Intravenous Once   • chlorhexidine (PERIDEX) 0.12 % oral rinse 15 mL  15 mL Swish & Spit Once   • clopidogrel (PLAVIX) tablet 75 mg  75 mg Oral Daily   • gabapentin (NEURONTIN) capsule 300 mg  300 mg Oral TID   • heparin (porcine) 25,000 units in 0.45% NaCl 250 mL infusion (premix)  3-20 Units/kg/hr (Order-Specific) Intravenous Titrated   • hydrALAZINE (APRESOLINE) injection 10 mg  10 mg Intravenous Q6H PRN   • HYDROmorphone (DILAUDID) injection 0.5 mg  0.5 mg Intravenous Q3H PRN   • labetalol (NORMODYNE) injection 5 mg  5 mg Intravenous Q15 Min PRN   • methocarbamol (ROBAXIN) tablet 500 mg  500 mg Oral Q6H [place]   • nicotine (NICODERM CQ) 14 mg/24hr TD 24 hr patch 1 patch  1 patch Transdermal Daily   • oxyCODONE (ROXICODONE) IR tablet 5 mg  5 mg Oral Q4H PRN    Or   • oxyCODONE (ROXICODONE) immediate release tablet 10 mg  10 mg Oral Q4H PRN   • senna (SENOKOT) tablet 8.6 mg  1 tablet Oral HS   • silver nitrate-potassium nitrate (ARZOL SILVER NITRATE) 59-83 % applicator 3 applicator  3 applicator Topical Once       Physical Exam:    General appearance: alert and oriented, in no acute distress  Skin: Skin color, texture, turgor normal. No rashes or lesions  Neurologic: Grossly normal  Head: Normocephalic, without obvious abnormality, atraumatic  Lungs: clear to auscultation bilaterally  Heart: regular rate and rhythm, S1, S2 normal, no murmur, click, rub or gallop  Abdomen: soft, non-tender; bowel sounds normal; no masses,  no organomegaly  Extremities: extremities normal, warm and well-perfused; no cyanosis, clubbing, or edema. RLE w/ drsg and ACE wrap intact. Wound/Incision:   RLE w/ drsg and ACE wrap intact.  R medial thigh incision well-approx, ANNA, no drainage    Pulse exam:  Femoral: Right: 2+   DP: Right: 2+ Left: 2+    NATALYA Dumont  8/3/2023

## 2023-08-04 DIAGNOSIS — I70.90 ARTERIAL OCCLUSION: ICD-10-CM

## 2023-08-04 LAB
ANION GAP SERPL CALCULATED.3IONS-SCNC: 6 MMOL/L
APTT PPP: 89 SECONDS (ref 23–37)
BUN SERPL-MCNC: 12 MG/DL (ref 5–25)
CALCIUM SERPL-MCNC: 8.8 MG/DL (ref 8.3–10.1)
CHLORIDE SERPL-SCNC: 105 MMOL/L (ref 96–108)
CO2 SERPL-SCNC: 25 MMOL/L (ref 21–32)
CREAT SERPL-MCNC: 0.83 MG/DL (ref 0.6–1.3)
ERYTHROCYTE [DISTWIDTH] IN BLOOD BY AUTOMATED COUNT: 13.2 % (ref 11.6–15.1)
GFR SERPL CREATININE-BSD FRML MDRD: 99 ML/MIN/1.73SQ M
GLUCOSE SERPL-MCNC: 126 MG/DL (ref 65–140)
HCT VFR BLD AUTO: 33.1 % (ref 36.5–49.3)
HGB BLD-MCNC: 11.4 G/DL (ref 12–17)
INR PPP: 1.06 (ref 0.84–1.19)
MCH RBC QN AUTO: 28.4 PG (ref 26.8–34.3)
MCHC RBC AUTO-ENTMCNC: 34.4 G/DL (ref 31.4–37.4)
MCV RBC AUTO: 82 FL (ref 82–98)
PLATELET # BLD AUTO: 608 THOUSANDS/UL (ref 149–390)
PMV BLD AUTO: 8.4 FL (ref 8.9–12.7)
POTASSIUM SERPL-SCNC: 3.9 MMOL/L (ref 3.5–5.3)
PROTHROMBIN TIME: 14 SECONDS (ref 11.6–14.5)
RBC # BLD AUTO: 4.02 MILLION/UL (ref 3.88–5.62)
SODIUM SERPL-SCNC: 136 MMOL/L (ref 135–147)
WBC # BLD AUTO: 14.99 THOUSAND/UL (ref 4.31–10.16)

## 2023-08-04 PROCEDURE — 80048 BASIC METABOLIC PNL TOTAL CA: CPT | Performed by: SURGERY

## 2023-08-04 PROCEDURE — 99024 POSTOP FOLLOW-UP VISIT: CPT | Performed by: SURGERY

## 2023-08-04 PROCEDURE — 99232 SBSQ HOSP IP/OBS MODERATE 35: CPT | Performed by: INTERNAL MEDICINE

## 2023-08-04 PROCEDURE — 85610 PROTHROMBIN TIME: CPT | Performed by: SURGERY

## 2023-08-04 PROCEDURE — 85027 COMPLETE CBC AUTOMATED: CPT | Performed by: SURGERY

## 2023-08-04 PROCEDURE — 85730 THROMBOPLASTIN TIME PARTIAL: CPT | Performed by: SURGERY

## 2023-08-04 PROCEDURE — 99232 SBSQ HOSP IP/OBS MODERATE 35: CPT | Performed by: NURSE PRACTITIONER

## 2023-08-04 RX ORDER — ENOXAPARIN SODIUM 100 MG/ML
70 INJECTION SUBCUTANEOUS EVERY 12 HOURS SCHEDULED
Status: DISCONTINUED | OUTPATIENT
Start: 2023-08-04 | End: 2023-08-05 | Stop reason: HOSPADM

## 2023-08-04 RX ORDER — WARFARIN SODIUM 5 MG/1
5 TABLET ORAL
Status: DISCONTINUED | OUTPATIENT
Start: 2023-08-04 | End: 2023-08-05

## 2023-08-04 RX ORDER — ENOXAPARIN SODIUM 100 MG/ML
70 INJECTION SUBCUTANEOUS EVERY 12 HOURS
Qty: 10 ML | Refills: 5 | Status: SHIPPED | OUTPATIENT
Start: 2023-08-04 | End: 2023-08-04 | Stop reason: SDUPTHER

## 2023-08-04 RX ORDER — WARFARIN SODIUM 5 MG/1
5 TABLET ORAL
Qty: 30 TABLET | Refills: 3 | Status: CANCELLED | OUTPATIENT
Start: 2023-08-04

## 2023-08-04 RX ORDER — ENOXAPARIN SODIUM 100 MG/ML
INJECTION SUBCUTANEOUS
Qty: 7 ML | Refills: 3 | OUTPATIENT
Start: 2023-08-04

## 2023-08-04 RX ORDER — ENOXAPARIN SODIUM 100 MG/ML
100 INJECTION SUBCUTANEOUS EVERY 24 HOURS
Qty: 7 ML | Refills: 3 | Status: SHIPPED | OUTPATIENT
Start: 2023-08-04 | End: 2023-08-04 | Stop reason: SDUPTHER

## 2023-08-04 RX ADMIN — CLOPIDOGREL BISULFATE 75 MG: 75 TABLET ORAL at 08:15

## 2023-08-04 RX ADMIN — WARFARIN SODIUM 5 MG: 5 TABLET ORAL at 17:10

## 2023-08-04 RX ADMIN — ACETAMINOPHEN 975 MG: 325 TABLET, FILM COATED ORAL at 21:01

## 2023-08-04 RX ADMIN — METHOCARBAMOL 500 MG: 500 TABLET ORAL at 05:20

## 2023-08-04 RX ADMIN — ATORVASTATIN CALCIUM 40 MG: 40 TABLET, FILM COATED ORAL at 17:10

## 2023-08-04 RX ADMIN — ACETAMINOPHEN 975 MG: 325 TABLET, FILM COATED ORAL at 13:00

## 2023-08-04 RX ADMIN — OXYCODONE HYDROCHLORIDE 5 MG: 5 TABLET ORAL at 20:57

## 2023-08-04 RX ADMIN — METHOCARBAMOL 500 MG: 500 TABLET ORAL at 13:00

## 2023-08-04 RX ADMIN — METHOCARBAMOL 500 MG: 500 TABLET ORAL at 17:10

## 2023-08-04 RX ADMIN — ENOXAPARIN SODIUM 70 MG: 80 INJECTION SUBCUTANEOUS at 20:57

## 2023-08-04 RX ADMIN — GABAPENTIN 300 MG: 300 CAPSULE ORAL at 08:15

## 2023-08-04 RX ADMIN — OXYCODONE HYDROCHLORIDE 5 MG: 5 TABLET ORAL at 00:30

## 2023-08-04 RX ADMIN — GABAPENTIN 300 MG: 300 CAPSULE ORAL at 20:57

## 2023-08-04 RX ADMIN — ACETAMINOPHEN 975 MG: 325 TABLET, FILM COATED ORAL at 05:20

## 2023-08-04 RX ADMIN — METHOCARBAMOL 500 MG: 500 TABLET ORAL at 00:29

## 2023-08-04 RX ADMIN — NICOTINE 1 PATCH: 14 PATCH, EXTENDED RELEASE TRANSDERMAL at 08:14

## 2023-08-04 RX ADMIN — OXYCODONE HYDROCHLORIDE 5 MG: 5 TABLET ORAL at 05:20

## 2023-08-04 RX ADMIN — GABAPENTIN 300 MG: 300 CAPSULE ORAL at 17:10

## 2023-08-04 RX ADMIN — HEPARIN SODIUM 21.1 UNITS/KG/HR: 10000 INJECTION, SOLUTION INTRAVENOUS at 05:18

## 2023-08-04 RX ADMIN — SENNOSIDES 8.6 MG: 8.6 TABLET, FILM COATED ORAL at 21:01

## 2023-08-04 RX ADMIN — ENOXAPARIN SODIUM 70 MG: 80 INJECTION SUBCUTANEOUS at 14:30

## 2023-08-04 NOTE — PLAN OF CARE
Problem: MOBILITY - ADULT  Goal: Maintain or return to baseline ADL function  Description: INTERVENTIONS:  -  Assess patient's ability to carry out ADLs; assess patient's baseline for ADL function and identify physical deficits which impact ability to perform ADLs (bathing, care of mouth/teeth, toileting, grooming, dressing, etc.)  - Assess/evaluate cause of self-care deficits   - Assess range of motion  - Assess patient's mobility; develop plan if impaired  - Assess patient's need for assistive devices and provide as appropriate  - Encourage maximum independence but intervene and supervise when necessary  - Involve family in performance of ADLs  - Assess for home care needs following discharge   - Consider OT consult to assist with ADL evaluation and planning for discharge  - Provide patient education as appropriate  Outcome: Progressing  Goal: Maintains/Returns to pre admission functional level  Description: INTERVENTIONS:  - Perform BMAT or MOVE assessment daily.   - Set and communicate daily mobility goal to care team and patient/family/caregiver. - Collaborate with rehabilitation services on mobility goals if consulted  - Perform Range of Motion 2 times a day. - Reposition patient every 2 hours.   - Dangle patient 2 times a day  - Stand patient 2 times a day  - Ambulate patient 2 times a day  - Out of bed to chair 2 times a day   - Out of bed for meals 2 times a day  - Out of bed for toileting  - Record patient progress and toleration of activity level   Outcome: Progressing     Problem: Prexisting or High Potential for Compromised Skin Integrity  Goal: Skin integrity is maintained or improved  Description: INTERVENTIONS:  - Identify patients at risk for skin breakdown  - Assess and monitor skin integrity  - Assess and monitor nutrition and hydration status  - Monitor labs   - Assess for incontinence   - Turn and reposition patient  - Assist with mobility/ambulation  - Relieve pressure over bony prominences  - Avoid friction and shearing  - Provide appropriate hygiene as needed including keeping skin clean and dry  - Evaluate need for skin moisturizer/barrier cream  - Collaborate with interdisciplinary team   - Patient/family teaching  - Consider wound care consult   Outcome: Progressing     Problem: PAIN - ADULT  Goal: Verbalizes/displays adequate comfort level or baseline comfort level  Description: Interventions:  - Encourage patient to monitor pain and request assistance  - Assess pain using appropriate pain scale  - Administer analgesics based on type and severity of pain and evaluate response  - Implement non-pharmacological measures as appropriate and evaluate response  - Consider cultural and social influences on pain and pain management  - Notify physician/advanced practitioner if interventions unsuccessful or patient reports new pain  Outcome: Progressing     Problem: INFECTION - ADULT  Goal: Absence or prevention of progression during hospitalization  Description: INTERVENTIONS:  - Assess and monitor for signs and symptoms of infection  - Monitor lab/diagnostic results  - Monitor all insertion sites, i.e. indwelling lines, tubes, and drains  - Monitor endotracheal if appropriate and nasal secretions for changes in amount and color  - Tyner appropriate cooling/warming therapies per order  - Administer medications as ordered  - Instruct and encourage patient and family to use good hand hygiene technique  - Identify and instruct in appropriate isolation precautions for identified infection/condition  Outcome: Progressing  Goal: Absence of fever/infection during neutropenic period  Description: INTERVENTIONS:  - Monitor WBC    Outcome: Progressing     Problem: DISCHARGE PLANNING  Goal: Discharge to home or other facility with appropriate resources  Description: INTERVENTIONS:  - Identify barriers to discharge w/patient and caregiver  - Arrange for needed discharge resources and transportation as appropriate  - Identify discharge learning needs (meds, wound care, etc.)  - Arrange for interpretive services to assist at discharge as needed  - Refer to Case Management Department for coordinating discharge planning if the patient needs post-hospital services based on physician/advanced practitioner order or complex needs related to functional status, cognitive ability, or social support system  Outcome: Progressing     Problem: Knowledge Deficit  Goal: Patient/family/caregiver demonstrates understanding of disease process, treatment plan, medications, and discharge instructions  Description: Complete learning assessment and assess knowledge base.   Interventions:  - Provide teaching at level of understanding  - Provide teaching via preferred learning methods  Outcome: Progressing     Problem: MUSCULOSKELETAL - ADULT  Goal: Maintain or return mobility to safest level of function  Description: INTERVENTIONS:  - Assess patient's ability to carry out ADLs; assess patient's baseline for ADL function and identify physical deficits which impact ability to perform ADLs (bathing, care of mouth/teeth, toileting, grooming, dressing, etc.)  - Assess/evaluate cause of self-care deficits   - Assess range of motion  - Assess patient's mobility  - Assess patient's need for assistive devices and provide as appropriate  - Encourage maximum independence but intervene and supervise when necessary  - Involve family in performance of ADLs  - Assess for home care needs following discharge   - Consider OT consult to assist with ADL evaluation and planning for discharge  - Provide patient education as appropriate  Outcome: Progressing  Goal: Maintain proper alignment of affected body part  Description: INTERVENTIONS:  - Support, maintain and protect limb and body alignment  - Provide patient/ family with appropriate education  Outcome: Progressing

## 2023-08-04 NOTE — CASE MANAGEMENT
Case Management Progress Note    Patient name Rachel Martinez  Location [address] [facility] 503/[facility] 708-94 MRN [id number]  : 1970 Date 2023       LOS (days): 7  Geometric Mean LOS (GMLOS) (days): 4.30  Days to GMLOS:-2.3        OBJECTIVE:        Current admission status: Inpatient  Preferred Pharmacy:   [address] [place] [address] [id number] Eddie Friend, [address] [place] [address] [address] [place] [address]  Phone: [phone] Fax: [phone]    Primary Care Provider: Shirin Alex DO    Primary Insurance: [address]  Secondary Insurance:     PROGRESS NOTE: Patient is s/p fasciotomy closure. Remains on hep gtt. Spoke with rep at [employer] [phone] to obtain price check for Lovenox. Cost is $166.92. Met with patient and patient's spouse at bedside. Patient agreeable to cost.     [facility] able to accept. Reserved in [address] Update at 1413: Per provider, patient for DC tomorrow. Notified [facility] to have prescription filled. Attempted to order commode. Per [address], patient has a copay $100.47. Patient states he will purchase one himself and to cancel order. Order cancelled.

## 2023-08-04 NOTE — PROGRESS NOTES
Progress Note - Acute Pain Service    Sayra Hull 46 y.o. male MRN: [id number]  Unit/Bed#: [facility] 503-01 Encounter: [id number]      Sayra Hull is a 46 y.o. male who presented on 7/28/23 with 2 weeks of RLE pain found to have an acute occlusion of the mid and distal superficial femoral artery. Vascular surgery was consulted, the patient underwent a right SFA cut-down, agram, SFA PTA/stent and right anterolateral fasciotomy on 7/29. On 8/3 the patient had a right fasciotomy wash out and closure. APS has been following to help with post-operative pain. On my assessment this morning the patient was standing at the bedside. He expressed feeling much better and rated his pain a 3/10. We discussed discontinuing IV dilaudid in preparation for discharge the patient was agreeable. The patients last BM was 2 days ago, he is on senna and passing gas. * Arterial occlusion  Assessment & Plan  S/p R SFA cutdown, balloon angioplasty & stenting as well as R leg fasciotomy with recent closure on 8/3/23  - Continue   · Tylenol 975 mg Q8hrs   · Gabapentin 300 mg TID (Cr 0.83/GFR 99)  · Robaxin 500 q6hrs   PRN   · Oxycodone 5 mg PO q4 hrs PRN for moderate pain  · Oxycodone 10 mg PO q4 hrs PRN for severe pain   · Discontinue IV dilaudid       APS will sign off at this time. Thank you for the consult. All opioids and other analgesics to be written at discretion of primary team. Please contact Acute Pain Service - via [employer] from [phone] with additional questions or concerns. See [employer] or [facility] for additional contacts and after hours information. Pain History  Current pain location(s):  Pain Score: 4  Pain Location/Orientation: Orientation: Right, Location: Leg  Pain Scale: Pain Assessment Tool: 0-10  24 hour history: Closure of fasciotomy on 8/3. Overall doing well with PO pain regimen.      Opioid requirement previous 24 hours:   Fentanyl IV 150mcg   0.5 mg IV dilaudid   20 mg oxycodone     Meds/Allergies all current active meds have been reviewed    No Known Allergies    Objective        Vitals:    08/03/23 1724 08/03/23 2307 08/04/23 0522 08/04/23 0737   BP: 163/79 100/73  101/72   BP Location:  Left arm     Pulse: 68 68  82   Resp: 16 18  16   Temp: 98.3 °F (36.8 °C) 98 °F (36.7 °C)  97.9 °F (36.6 °C)   TempSrc:  Oral  Oral   SpO2: 97% 98%  98%   Weight:   68 kg (150 lb)    Height:             Physical Exam  Constitutional:       Appearance: Normal appearance. HENT:      Head: Normocephalic and atraumatic. Right Ear: External ear normal.      Left Ear: External ear normal.      Nose: Nose normal.      Mouth/Throat:      Mouth: Mucous membranes are moist.      Pharynx: Oropharynx is clear. Eyes:      Extraocular Movements: Extraocular movements intact. Pupils: Pupils are equal, round, and reactive to light. Cardiovascular:      Rate and Rhythm: Normal rate and regular rhythm. Heart sounds: Normal heart sounds. Pulmonary:      Effort: Pulmonary effort is normal.      Breath sounds: Normal breath sounds. Abdominal:      General: Bowel sounds are normal.      Palpations: Abdomen is soft. Musculoskeletal:         General: Normal range of motion. Cervical back: Normal range of motion. Comments: RLE limited ROM due to pain    Skin:     General: Skin is warm and dry. Capillary Refill: Capillary refill takes less than 2 seconds. Comments: Dressing present on RLE, clean, dry & intact    Neurological:      General: No focal deficit present. Mental Status: He is alert and oriented to person, place, and time. Psychiatric:         Behavior: Behavior normal.         Thought Content: Thought content normal.           Lab Results:   Estimated Creatinine Clearance: 97.9 mL/min (by C-G formula based on SCr of 0.83 mg/dL).   Lab Results   Component Value Date    WBC 14.99 (H) 08/04/2023    HGB 11.4 (L) 08/04/2023    HCT 33.1 (L) 08/04/2023     (H) 08/04/2023 Component Value Date/Time    K 3.9 08/04/2023 0515     08/04/2023 0515    CO2 25 08/04/2023 0515    BUN 12 08/04/2023 0515    CREATININE 0.83 08/04/2023 0515         Component Value Date/Time    CALCIUM 8.8 08/04/2023 0515    ALKPHOS 73 07/31/2023 0741    AST 38 07/31/2023 0741    ALT 57 07/31/2023 0741    TP 7.1 07/31/2023 0741    ALB 3.1 (L) 07/31/2023 0741       Imaging Studies/EKG: I have personally reviewed pertinent reports. Counseling / Coordination of Care  Total floor / unit time spent today 15 minutes minutes. Greater than 50% of total time was spent with the patient and / or family counseling and / or coordination of care. A description of the counseling / coordination of care: post- op pain     Please note that the APS provides consultative services regarding pain management only. With the exception of ketamine and epidural infusions and except when indicated, final decisions regarding starting or changing doses of analgesic medications are at the discretion of the consulting service.     NATALYA Mosher   Acute Pain Service

## 2023-08-04 NOTE — PROGRESS NOTES
-- Patient:  -- MRN: [id number]  -- Aidin Request ID: [id number]  -- Level of care reserved: Sue Meier  -- Partner Reserved: [facility], [address] [place] [address] [phone]  -- Clinical needs requested:  -- Geography searched: [phone]  -- Start of Service:  -- Request sent: 12:22pm EDT on 8/3/2023 by Sully Candelario  -- Partner reserved: 10:35am EDT on 8/4/2023 by Sully Candelario  -- Choice list shared: 10:35am EDT on 8/4/2023 by Sully Candelario

## 2023-08-04 NOTE — PROGRESS NOTES
[address] [place] [address]  Progress Note  Name: Inocencio Cabrera  MRN: [id number]  Unit/Bed#: [facility] 503-01 I Date of Admission: 7/28/2023   Date of Service: 8/4/2023 I Hospital Day: 7    Assessment/Plan   * Arterial occlusion  Assessment & Plan  · Presented with 2 weeks of right lower extremity pain/claudication  · Articular Doppler ultrasound showing acute occlusion of the mid/distal superficial femoral artery. Occlusion of the distal anterior tibial artery. · Transferred to [address] [place] [address] for evaluation    · Status post right SFA cutdown, balloon angioplasty, stenting, right leg fasciotomy on 7/29  · Post additional washout and fasciotomy closure on 8/3  · Primary management per vascular surgery team  · Continue on heparin drip with bridge to Coumadin, aspirin, statin  · Multi-Podus boots  · Local wound care        Hyperlipidemia, mixed  Assessment & Plan  · Continue statin    Essential hypertension  Assessment & Plan  · Controlled on amlodipine 5 mg daily; continue to monitor    Tobacco dependence  Assessment & Plan  · NRT ordered while inpatient  · Cessation advised    Abnormal chest CT  Assessment & Plan    · CT read as multiple right upper lobe opacity concerning for infectious process likely atypical infection. Patient denies any shortness of breath cough or any respiratory symptoms. Leukocytosis noted but remained afebrile  · Given lack of pulmonary symptoms to monitor off of antibiotics. · Procalcitonin negative    Leukocytosis (leucocytosis)-resolved as of 8/3/2023  Assessment & Plan  · White count noted at 17 K on 7/31  · Patient without any infectious symptoms and afebrile; this is likely reactive secondary to surgery  · Procalcitonin negative  · Continue to monitor off antibiotics  · Improved      VTE Pharmacologic Prophylaxis:   Moderate Risk (Score 3-4) - Pharmacological DVT Prophylaxis Ordered: heparin drip.     Patient Centered Rounds: I performed bedside rounds with nursing staff today. Discussions with Specialists or Other Care Team Provider: LUCIANA.     Education and Discussions with Family / Patient: Patient declined call to . Total Time Spent on Date of Encounter in care of patient: 35 minutes This time was spent on one or more of the following: performing physical exam; counseling and coordination of care; obtaining or reviewing history; documenting in the medical record; reviewing/ordering tests, medications or procedures; communicating with other healthcare professionals and discussing with patient's family/caregivers. Current Length of Stay: 7 day(s)  Current Patient Status: Inpatient   Certification Statement: The patient will continue to require additional inpatient hospital stay due to fasciotomy closure, pain management, heparin gtt  Discharge Plan: TBD pending surgical closure    Code Status: Level 1 - Full Code    Subjective:   Patient seen and examined. No new complaints. Pain controlled. Afebrile. Objective:     Vitals:   Temp (24hrs), Av.1 °F (36.7 °C), Min:97.9 °F (36.6 °C), Max:98.3 °F (36.8 °C)    Temp:  [97.9 °F (36.6 °C)-98.3 °F (36.8 °C)] 97.9 °F (36.6 °C)  HR:  [67-82] 82  Resp:  [13-18] 16  BP: (100-197)/(72-97) 101/72  SpO2:  [97 %-99 %] 98 %  Body mass index is 22.15 kg/m². Input and Output Summary (last 24 hours): Intake/Output Summary (Last 24 hours) at 2023 1241  Last data filed at 2023 0737  Gross per 24 hour   Intake 1319.53 ml   Output 450 ml   Net 869.53 ml       PHYSICAL EXAM:    Vitals signs reviewed  Constitutional   Awake and cooperative. NAD. Head/Neck   Normocephalic. Atraumatic. HEENT   No scleral icterus. EOMI. Heart   Regular rate and rhythm. No murmers. Lungs   Clear to auscultation bilaterally. Respirations unlaboured. Abdomen   Soft. Nontender. Nondistended. Skin   Skin color normal. No rashes. Extremities   No deformities. No peripheral edema.   Left lower extremity in bandages, clean and dry. Neuro   Alert and oriented. No new deficits. Psych   Mood stable. Affect normal.         Additional Data:     Labs:  Results from last 7 days   Lab Units 08/04/23 0515 08/02/23  0501 08/01/23 0536 07/31/23  0741   WBC Thousand/uL 14.99* 11.07*   < > 17.67*   HEMOGLOBIN g/dL 11.4* 12.3   < > 12.7   HEMATOCRIT % 33.1* 35.8*   < > 37.3   PLATELETS Thousands/uL 608* 536*   < > 499*   NEUTROS PCT %  --   --   --  63   LYMPHS PCT %  --   --   --  29   LYMPHO PCT %  --  20  --   --    MONOS PCT %  --   --   --  8   MONO PCT %  --  5  --   --    EOS PCT %  --  1  --  0    < > = values in this interval not displayed. Results from last 7 days   Lab Units 08/04/23 0515 08/01/23 0536 07/31/23  0741   SODIUM mmol/L 136   < > 136   POTASSIUM mmol/L 3.9   < > 3.5   CHLORIDE mmol/L 105   < > 108   CO2 mmol/L 25   < > 27   BUN mg/dL 12   < > 11   CREATININE mg/dL 0.83   < > 0.64   ANION GAP mmol/L 6   < > 1   CALCIUM mg/dL 8.8   < > 8.5   ALBUMIN g/dL  --   --  3.1*   TOTAL BILIRUBIN mg/dL  --   --  0.66   ALK PHOS U/L  --   --  73   ALT U/L  --   --  57   AST U/L  --   --  38   GLUCOSE RANDOM mg/dL 126   < > 112    < > = values in this interval not displayed. Results from last 7 days   Lab Units 08/04/23  0515   INR  1.06     Results from last 7 days   Lab Units 08/03/23  1704   POC GLUCOSE mg/dl 81         Results from last 7 days   Lab Units 08/01/23  0536   PROCALCITONIN ng/ml 0.05       Lines/Drains:  Invasive Devices     Peripheral Intravenous Line  Duration           Peripheral IV 08/01/23 Distal;Ventral (anterior); Right Forearm 3 days                      Imaging: No pertinent imaging reviewed.     Recent Cultures (last 7 days):         Last 24 Hours Medication List:   Current Facility-Administered Medications   Medication Dose Route Frequency Provider Last Rate   • acetaminophen  975 mg Oral [facility] Estrella Norwood MD     • amLODIPine  5 mg Oral Daily Estrella Norwood MD     • atorvastatin  40 mg Oral After Edmund Peoples MD     • clopidogrel  75 mg Oral Daily Polina Navarro MD     • gabapentin  300 mg Oral TID Polina Navarro MD     • heparin (porcine)  3-20 Units/kg/hr (Order-Specific) Intravenous Titrated Polina Navarro MD 21.1 Units/kg/hr (08/04/23 0518)   • hydrALAZINE  10 mg Intravenous Q6H PRN Polina Navarro MD     • HYDROmorphone  0.5 mg Intravenous Q3H PRN Polina Navarro MD     • labetalol  5 mg Intravenous Q15 Min PRN Polina Navarro MD     • methocarbamol  500 mg Oral Q6H [facility] Polina Navarro MD     • nicotine  1 patch Transdermal Daily Polina Navarro MD     • oxyCODONE  5 mg Oral Q4H PRN Polina Navarro MD      Or   • oxyCODONE  10 mg Oral Q4H PRN Polina Navarro MD     • senna  1 tablet Oral HS Polina Navarro MD     • silver nitrate-potassium nitrate  3 applicator Topical Once Polina Navarro MD     • warfarin  5 mg Oral Daily (warfarin) Rebecca Ralph PA-C          Today, Patient Was Seen By: Urban Villarreal DO    **Please Note: This note may have been constructed using a voice recognition system. **

## 2023-08-04 NOTE — ASSESSMENT & PLAN NOTE
S/p R SFA cutdown, balloon angioplasty & stenting as well as R leg fasciotomy with recent closure on 8/3/23  - Continue   · Tylenol 975 mg Q8hrs   · Gabapentin 300 mg TID (Cr 0.83/GFR 99)  · Robaxin 500 q6hrs   PRN   · Oxycodone 5 mg PO q4 hrs PRN for moderate pain  · Oxycodone 10 mg PO q4 hrs PRN for severe pain   · Discontinue IV dilaudid

## 2023-08-04 NOTE — PROGRESS NOTES
[address] [place] [address]  Progress Note  Name: Pat Alejandre  MRN: [id number]  Unit/Bed#: [facility] 503-01 I Date of Admission: 7/28/2023   Date of Service: 8/4/2023 I Hospital Day: 7    Assessment/Plan   * Arterial occlusion  Assessment & Plan  45 y/o M presents with subacute Right limb ischemia w/ c/o 2 weeks of pain and numbness in setting of Right SFA occlusion    S/P Right SFA cut-down, Agram, SFA PTA/stent, and Right anterolateral fasciotomy 7/29  S/P Right Fasciotomy washout, closure 8/3    Plan:  Continue Heparin gtt, transition to coumadin, monitor INR  Continue Plavix   Continue statin  APS following for pain control  Multipodus boot to RLE  PT/OT re-eval s/p fasciotomy closure  Incidental pancreatic lesion and adrenal lesion noted on CT; outpatient follow-up with Surgical Oncology  Case management for dispo planning         Subjective:  Pt doing well, no events overnight    Vitals:  /72   Pulse 82   Temp 97.9 °F (36.6 °C) (Oral)   Resp 16   Ht 5' 9" (1.753 m)   Wt 68 kg (150 lb)   SpO2 98%   BMI 22.15 kg/m²     I/Os:  I/O last 3 completed shifts:   In: 1199.5 [P.O.:540; I.V.:659.5]  Out: 1075 [Urine:1075]  I/O this shift:  In: 180 [P.O.:180]  Out: -     Lab Results and Cultures:   Lab Results   Component Value Date    WBC 14.99 (H) 08/04/2023    HGB 11.4 (L) 08/04/2023    HCT 33.1 (L) 08/04/2023    MCV 82 08/04/2023     (H) 08/04/2023     Lab Results   Component Value Date    CALCIUM 8.8 08/04/2023    K 3.9 08/04/2023    CO2 25 08/04/2023     08/04/2023    BUN 12 08/04/2023    CREATININE 0.83 08/04/2023     Lab Results   Component Value Date    INR 1.06 08/04/2023    INR 1.03 07/29/2023    INR 1.08 07/28/2023    PROTIME 14.0 08/04/2023    PROTIME 13.7 07/29/2023    PROTIME 13.8 07/28/2023        Blood Culture: No results found for: "Yvetta Timo",   Urinalysis: No results found for: "COLORU", "CLARITYU", "SPECGRAV", "PHUR", "LEUKOCYTESUR", "NITRITE", "PROTEINUA", "GLUCOSEU", "KETONESU", "BILIRUBINUR", "BLOODU",   Urine Culture: No results found for: "URINECX",   Wound Culure: No results found for: "WOUNDCULT"    Medications:  Current Facility-Administered Medications   Medication Dose Route Frequency   • acetaminophen (TYLENOL) tablet 975 mg  975 mg Oral Q8H [facility]   • amLODIPine (NORVASC) tablet 5 mg  5 mg Oral Daily   • atorvastatin (LIPITOR) tablet 40 mg  40 mg Oral After Dinner   • clopidogrel (PLAVIX) tablet 75 mg  75 mg Oral Daily   • gabapentin (NEURONTIN) capsule 300 mg  300 mg Oral TID   • heparin (porcine) 25,000 units in 0.45% NaCl 250 mL infusion (premix)  3-20 Units/kg/hr (Order-Specific) Intravenous Titrated   • hydrALAZINE (APRESOLINE) injection 10 mg  10 mg Intravenous Q6H PRN   • HYDROmorphone (DILAUDID) injection 0.5 mg  0.5 mg Intravenous Q3H PRN   • labetalol (NORMODYNE) injection 5 mg  5 mg Intravenous Q15 Min PRN   • methocarbamol (ROBAXIN) tablet 500 mg  500 mg Oral Q6H [place]   • nicotine (NICODERM CQ) 14 mg/24hr TD 24 hr patch 1 patch  1 patch Transdermal Daily   • oxyCODONE (ROXICODONE) IR tablet 5 mg  5 mg Oral Q4H PRN    Or   • oxyCODONE (ROXICODONE) immediate release tablet 10 mg  10 mg Oral Q4H PRN   • senna (SENOKOT) tablet 8.6 mg  1 tablet Oral HS   • silver nitrate-potassium nitrate (ARZOL SILVER NITRATE) 46-67 % applicator 3 applicator  3 applicator Topical Once         Physical Exam:    General appearance: alert and oriented, in no acute distress  Lungs: clear to auscultation bilaterally  Heart: regular rate and rhythm, S1, S2 normal, no murmur, click, rub or gallop  Abdomen: soft, non-tender; bowel sounds normal; no masses,  no organomegaly  Extremities: RLE M/S grossly intact, ankle flexion limited, +edema. Feet warm, well perfused.     Wound/Incision:  RLE dressing clean, dry, and intact    Pulse exam:  DP: Right: doppler signal Left: 2+  PT: Right: doppler signal Left: 2+      Renella Labs, PA-C  8/4/2023

## 2023-08-04 NOTE — ASSESSMENT & PLAN NOTE
47 y/o M presents with subacute Right limb ischemia w/ c/o 2 weeks of pain and numbness in setting of Right SFA occlusion    S/P Right SFA cut-down, Agram, SFA PTA/stent, and Right anterolateral fasciotomy 7/29  S/P Right Fasciotomy washout, closure 8/3    Plan:  Continue Heparin gtt, transition to coumadin, monitor INR  Continue Plavix   Continue statin  APS following for pain control  Multipodus boot to RLE  PT/OT re-eval s/p fasciotomy closure  Incidental pancreatic lesion and adrenal lesion noted on CT; outpatient follow-up with Surgical Oncology  Case management for dispo planning

## 2023-08-05 VITALS
WEIGHT: 149.03 LBS | SYSTOLIC BLOOD PRESSURE: 135 MMHG | HEART RATE: 75 BPM | TEMPERATURE: 98.1 F | OXYGEN SATURATION: 100 % | HEIGHT: 69 IN | DIASTOLIC BLOOD PRESSURE: 70 MMHG | BODY MASS INDEX: 22.07 KG/M2 | RESPIRATION RATE: 17 BRPM

## 2023-08-05 LAB
ANION GAP SERPL CALCULATED.3IONS-SCNC: 5 MMOL/L
BUN SERPL-MCNC: 7 MG/DL (ref 5–25)
CALCIUM SERPL-MCNC: 9 MG/DL (ref 8.3–10.1)
CHLORIDE SERPL-SCNC: 105 MMOL/L (ref 96–108)
CO2 SERPL-SCNC: 27 MMOL/L (ref 21–32)
CREAT SERPL-MCNC: 0.74 MG/DL (ref 0.6–1.3)
ERYTHROCYTE [DISTWIDTH] IN BLOOD BY AUTOMATED COUNT: 13.2 % (ref 11.6–15.1)
GFR SERPL CREATININE-BSD FRML MDRD: 104 ML/MIN/1.73SQ M
GLUCOSE SERPL-MCNC: 99 MG/DL (ref 65–140)
HCT VFR BLD AUTO: 34.5 % (ref 36.5–49.3)
HGB BLD-MCNC: 11.8 G/DL (ref 12–17)
INR PPP: 1.1 (ref 0.84–1.19)
MCH RBC QN AUTO: 28.2 PG (ref 26.8–34.3)
MCHC RBC AUTO-ENTMCNC: 34.2 G/DL (ref 31.4–37.4)
MCV RBC AUTO: 82 FL (ref 82–98)
PLATELET # BLD AUTO: 628 THOUSANDS/UL (ref 149–390)
PMV BLD AUTO: 8.5 FL (ref 8.9–12.7)
POTASSIUM SERPL-SCNC: 4.2 MMOL/L (ref 3.5–5.3)
PROTHROMBIN TIME: 14.4 SECONDS (ref 11.6–14.5)
RBC # BLD AUTO: 4.19 MILLION/UL (ref 3.88–5.62)
SODIUM SERPL-SCNC: 137 MMOL/L (ref 135–147)
WBC # BLD AUTO: 11.46 THOUSAND/UL (ref 4.31–10.16)

## 2023-08-05 PROCEDURE — 85610 PROTHROMBIN TIME: CPT | Performed by: PHYSICIAN ASSISTANT

## 2023-08-05 PROCEDURE — 85027 COMPLETE CBC AUTOMATED: CPT | Performed by: PHYSICIAN ASSISTANT

## 2023-08-05 PROCEDURE — NC001 PR NO CHARGE: Performed by: PHYSICIAN ASSISTANT

## 2023-08-05 PROCEDURE — 99024 POSTOP FOLLOW-UP VISIT: CPT | Performed by: SURGERY

## 2023-08-05 PROCEDURE — 99232 SBSQ HOSP IP/OBS MODERATE 35: CPT | Performed by: FAMILY MEDICINE

## 2023-08-05 PROCEDURE — 97112 NEUROMUSCULAR REEDUCATION: CPT

## 2023-08-05 PROCEDURE — 97164 PT RE-EVAL EST PLAN CARE: CPT

## 2023-08-05 PROCEDURE — 97530 THERAPEUTIC ACTIVITIES: CPT

## 2023-08-05 PROCEDURE — 80048 BASIC METABOLIC PNL TOTAL CA: CPT | Performed by: PHYSICIAN ASSISTANT

## 2023-08-05 PROCEDURE — 97168 OT RE-EVAL EST PLAN CARE: CPT

## 2023-08-05 RX ORDER — WARFARIN SODIUM 5 MG/1
10 TABLET ORAL
Status: DISCONTINUED | OUTPATIENT
Start: 2023-08-05 | End: 2023-08-05 | Stop reason: HOSPADM

## 2023-08-05 RX ORDER — OXYCODONE HYDROCHLORIDE 5 MG/1
5 TABLET ORAL EVERY 4 HOURS PRN
Qty: 15 TABLET | Refills: 0 | Status: SHIPPED | OUTPATIENT
Start: 2023-08-05 | End: 2023-08-15

## 2023-08-05 RX ORDER — GABAPENTIN 300 MG/1
300 CAPSULE ORAL 3 TIMES DAILY
Qty: 90 CAPSULE | Refills: 1 | Status: SHIPPED | OUTPATIENT
Start: 2023-08-05 | End: 2023-08-05 | Stop reason: SDUPTHER

## 2023-08-05 RX ORDER — ENOXAPARIN SODIUM 100 MG/ML
70 INJECTION SUBCUTANEOUS EVERY 12 HOURS
Qty: 10 ML | Refills: 3 | Status: SHIPPED | OUTPATIENT
Start: 2023-08-05 | End: 2023-08-05 | Stop reason: SDUPTHER

## 2023-08-05 RX ORDER — WARFARIN SODIUM 5 MG/1
TABLET ORAL
Qty: 5 TABLET | Refills: 0 | Status: SHIPPED | OUTPATIENT
Start: 2023-08-05 | End: 2023-08-05 | Stop reason: SDUPTHER

## 2023-08-05 RX ORDER — ACETAMINOPHEN 325 MG/1
650 TABLET ORAL EVERY 6 HOURS PRN
COMMUNITY
Start: 2023-08-05

## 2023-08-05 RX ORDER — WARFARIN SODIUM 5 MG/1
TABLET ORAL
Qty: 5 TABLET | Refills: 0 | Status: SHIPPED | OUTPATIENT
Start: 2023-08-05 | End: 2023-08-09 | Stop reason: SDUPTHER

## 2023-08-05 RX ORDER — ENOXAPARIN SODIUM 100 MG/ML
70 INJECTION SUBCUTANEOUS EVERY 12 HOURS
Qty: 10 ML | Refills: 0 | Status: SHIPPED | OUTPATIENT
Start: 2023-08-05

## 2023-08-05 RX ORDER — CLOPIDOGREL BISULFATE 75 MG/1
75 TABLET ORAL DAILY
Qty: 30 TABLET | Refills: 0 | Status: SHIPPED | OUTPATIENT
Start: 2023-08-05

## 2023-08-05 RX ORDER — GABAPENTIN 300 MG/1
300 CAPSULE ORAL 3 TIMES DAILY
Qty: 90 CAPSULE | Refills: 0 | Status: SHIPPED | OUTPATIENT
Start: 2023-08-05

## 2023-08-05 RX ORDER — ATORVASTATIN CALCIUM 40 MG/1
40 TABLET, FILM COATED ORAL
Qty: 30 TABLET | Refills: 0 | Status: SHIPPED | OUTPATIENT
Start: 2023-08-05

## 2023-08-05 RX ORDER — NICOTINE 21 MG/24HR
1 PATCH, TRANSDERMAL 24 HOURS TRANSDERMAL DAILY
Qty: 28 PATCH | Refills: 0 | Status: SHIPPED | OUTPATIENT
Start: 2023-08-05

## 2023-08-05 RX ORDER — CLOPIDOGREL BISULFATE 75 MG/1
75 TABLET ORAL DAILY
Qty: 30 TABLET | Refills: 1 | Status: SHIPPED | OUTPATIENT
Start: 2023-08-05 | End: 2023-08-05 | Stop reason: SDUPTHER

## 2023-08-05 RX ORDER — NICOTINE 21 MG/24HR
1 PATCH, TRANSDERMAL 24 HOURS TRANSDERMAL DAILY
Qty: 28 PATCH | Refills: 0 | Status: SHIPPED | OUTPATIENT
Start: 2023-08-05 | End: 2023-08-05 | Stop reason: SDUPTHER

## 2023-08-05 RX ORDER — SENNOSIDES 8.6 MG
8.6 TABLET ORAL
COMMUNITY
Start: 2023-08-05

## 2023-08-05 RX ORDER — ATORVASTATIN CALCIUM 40 MG/1
40 TABLET, FILM COATED ORAL
Qty: 30 TABLET | Refills: 1 | Status: SHIPPED | OUTPATIENT
Start: 2023-08-05 | End: 2023-08-05 | Stop reason: SDUPTHER

## 2023-08-05 RX ADMIN — ACETAMINOPHEN 975 MG: 325 TABLET, FILM COATED ORAL at 05:50

## 2023-08-05 RX ADMIN — NICOTINE 1 PATCH: 14 PATCH, EXTENDED RELEASE TRANSDERMAL at 08:21

## 2023-08-05 RX ADMIN — GABAPENTIN 300 MG: 300 CAPSULE ORAL at 08:21

## 2023-08-05 RX ADMIN — ACETAMINOPHEN 975 MG: 325 TABLET, FILM COATED ORAL at 13:29

## 2023-08-05 RX ADMIN — OXYCODONE HYDROCHLORIDE 5 MG: 5 TABLET ORAL at 09:55

## 2023-08-05 RX ADMIN — METHOCARBAMOL 500 MG: 500 TABLET ORAL at 00:50

## 2023-08-05 RX ADMIN — OXYCODONE HYDROCHLORIDE 5 MG: 5 TABLET ORAL at 00:54

## 2023-08-05 RX ADMIN — OXYCODONE HYDROCHLORIDE 5 MG: 5 TABLET ORAL at 05:50

## 2023-08-05 RX ADMIN — METHOCARBAMOL 500 MG: 500 TABLET ORAL at 05:50

## 2023-08-05 RX ADMIN — AMLODIPINE BESYLATE 5 MG: 5 TABLET ORAL at 08:20

## 2023-08-05 RX ADMIN — METHOCARBAMOL 500 MG: 500 TABLET ORAL at 13:29

## 2023-08-05 RX ADMIN — CLOPIDOGREL BISULFATE 75 MG: 75 TABLET ORAL at 08:20

## 2023-08-05 RX ADMIN — ENOXAPARIN SODIUM 70 MG: 80 INJECTION SUBCUTANEOUS at 08:21

## 2023-08-05 NOTE — PLAN OF CARE
Problem: OCCUPATIONAL THERAPY ADULT  Goal: Performs self-care activities at highest level of function for planned discharge setting. See evaluation for individualized goals. Description: Treatment Interventions: ADL retraining, Functional transfer training, UE strengthening/ROM, Endurance training, Equipment evaluation/education, Patient/family training, Compensatory technique education, Continued evaluation, Energy conservation, Activityengagement          See flowsheet documentation for full assessment, interventions and recommendations. Note: Limitation: Decreased ADL status, Decreased Safe judgement during ADL, Decreased endurance, Decreased high-level ADLs  Prognosis: Good  Assessment: Pt is a 47 yo male seen for OT re-eval s/p fasciotomy closure. Pt with active OT orders and activity as tolerated orders. Please refer to initial eval 7/31/23 for home set up/PLOF. Per vascular team via tiger text, Pt to maintain WBAT RLE. Pt is currently demonstrating the following occupational deficits: S UB self care, Min A LB self care, CGA ambulation w/ WBAT RLE and crutches vs Mod I ambulation w/ NWB RLE (Pt reports he prefers this due to RLE pain). These deficits that are impacting pt's baseline areas of occupation are a result of the following impairments: pain, endurance, activity tolerance, functional mobility, decreased I w/ ADLS/IADLS, strength and decreased safety awareness. The following Occupational Performance Areas to address include: grooming, bathing/shower, toilet hygiene, dressing, health maintenance, functional mobility and clothing management. Recommend home with family support and outpatient therapy for RLE upon D/C. The patient's raw score on the -PAC Daily Activity Inpatient Short Form is 22. A raw score of greater than or equal to 19 suggests the patient may benefit from discharge to home. Please refer to the recommendation of the Occupational Therapist for safe discharge planning.  Pt to continue to benefit from acute immediate OT services to address the following goals 2-3x/week to  w/in 10-14 days:     OT Discharge Recommendation: No rehabilitation needs (outpatient therapy)

## 2023-08-05 NOTE — PROGRESS NOTES
Progress Note - Vascular Surgery  Cece Coleman 46 y.o. male MRN: [id number]  Unit/Bed#: [facility] 503-01 Encounter: [id number]        Assessment:  Cece Coleman is a 46 y.o. male with acute/subacute RLE ischemia S/p SFA cutdown, antegrade R SFA viabahn stent and fasciotomies 7/29, s/p fasciotomy closure 8/3. PMHx significant for  HLD, HTN, tobacco use       Plan:  • Patient POD7 from RLE intervention and POD2 from fasciotomy closure, continue to progress well, Dispo planning  • Continue Lovenox bridge to coumadin, will also continue with Plavix and statin   • Patient continue to complain of numbness into right foot, consider increasing gabapentin dosage. • Patient currently in multipodus boot, there is concern for a fall with this boot during ambulation, will have to consider risk of foot drop and necessity of cam walker boot vs AFO. Will discuss with PT and Vascular attending. • Incidental pancreatic lesion and adrenal lesion noted on CT; outpatient follow-up with Surgical Oncology  • PT/OT re-evaluation s/p fasciotomy closure  • Dispo planning, Please wait for attending attestation for final recommendations. ________________________________________________________________  Subjective:  Cece Coleman was seen and evaluated at bedside. No acute events overnight. Patient continues to express numbness over right foot. States this has not changes since he first noticed it before hospitalization.        Medications:  Scheduled Meds:  Current Facility-Administered Medications   Medication Dose Route Frequency Provider Last Rate   • acetaminophen  975 mg Oral [facility] Flora Villalpando MD     • amLODIPine  5 mg Oral Daily Flora Villalpando MD     • atorvastatin  40 mg Oral After Matt Franks MD     • clopidogrel  75 mg Oral Daily Flora Villalpando MD     • enoxaparin  70 mg Subcutaneous Q12H [address] Tariq Marrero PA-C     • gabapentin  300 mg Oral TID Flora Villalpando MD     • hydrALAZINE 10 mg Intravenous Q6H PRN Tracy Sharp MD     • HYDROmorphone  0.5 mg Intravenous Q3H PRN Tracy Sharp MD     • labetalol  5 mg Intravenous Q15 Min PRN Tracy Sharp MD     • methocarbamol  500 mg Oral Q6H [address] Tracy Sharp MD     • nicotine  1 patch Transdermal Daily Tracy Sharp MD     • oxyCODONE  5 mg Oral Q4H PRN Tracy Sharp MD      Or   • oxyCODONE  10 mg Oral Q4H PRN Tracy Sharp MD     • senna  1 tablet Oral HS Tracy Sharp MD     • silver nitrate-potassium nitrate  3 applicator Topical Once Tracy Sharp MD     • warfarin  5 mg Oral Daily (warfarin) Dunia Page PA-C       Continuous Infusions:   PRN Meds:  •  hydrALAZINE  •  HYDROmorphone  •  labetalol  •  oxyCODONE **OR** oxyCODONE    Prior to Admission medications    Medication Sig Start Date End Date Taking?  Authorizing Provider   enoxaparin (LOVENOX) 80 mg/0.8 mL Inject 0.7 mL (70 mg total) under the skin every 12 (twelve) hours 8/4/23  Yes Luciana Ponce PA-C   methocarbamol (ROBAXIN) 500 mg tablet Take 1 tablet (500 mg total) by mouth 4 (four) times a day 7/19/23  Yes Becky Cramer DO   amLODIPine (NORVASC) 5 mg tablet Take 1 tablet (5 mg total) by mouth daily for 90 days 6/19/19 7/19/23  NATALYA Cervantes   aspirin (ECOTRIN LOW STRENGTH) 81 mg EC tablet Take 1 tablet (81 mg total) by mouth daily  Patient not taking: Reported on 7/28/2023 7/19/23   Becky Cramer DO   predniSONE 50 mg tablet take 1 tablet IN THE MORNING for 7 days  Patient not taking: Reported on 7/28/2023 7/17/23   Historical Provider, MD        Physical Exam:  Vitals:  Vitals:    08/04/23 0737 08/04/23 1430 08/04/23 2235 08/05/23 0552   BP: 101/72 105/71 137/69    BP Location:       Pulse: 82 73 67    Resp: 16 18 20    Temp: 97.9 °F (36.6 °C) 98.5 °F (36.9 °C) 98.1 °F (36.7 °C)    TempSrc: Oral  Oral    SpO2: 98% 100% 99%    Weight:    67.6 kg (149 lb 0.5 oz)   Height:           I/Os:  I/O last 3 completed shifts: In: 1319.5 [P.O.:660; I.V.:659.5]  Out: 450 [Urine:450]  I/O this shift:  In: 360 [P.O.:360]  Out: 575 [Urine:575]    Invasive Lines/Tubes:  Invasive Devices     Peripheral Intravenous Line  Duration           Peripheral IV 08/05/23 Right;Ventral (anterior) Forearm <1 day                General: AAO x3,in NAD. CV: No carotid bruits and Regular rate & rhythm  Respiratory: slung: Non-labored breathing,  room air   Abdominal: abdomen is soft, non-tender and non-distended. Sensory: Intact with patient able to discern multiple points of light touch. Motor: Within normal limits. Vascular Exam:    Focused evaluation of RLE incision sites appear to be clean, dry and intact. No signs of active drainage. Motor exam is intact. Pulse exam:  Right: palp fem Dop DP/PT    Left: Palp Fem Dop DP/PT            Lab Results and Cultures:   CBC w/ Diff:  Results from last 7 days   Lab Units 08/05/23  0547 08/04/23  0515 08/02/23  0501 08/01/23  0536 07/31/23  0741 07/29/23  1042 07/29/23  0649   WBC Thousand/uL 11.46* 14.99* 11.07*   < > 17.67*   < > 11.65*   HEMOGLOBIN g/dL 11.8* 11.4* 12.3   < > 12.7   < > 14.9   HEMATOCRIT % 34.5* 33.1* 35.8*   < > 37.3   < > 45.2   PLATELETS Thousands/uL 628* 608* 536*   < > 499*   < > 553*   MCV fL 82 82 81*   < > 82   < > 84   RBC Million/uL 4.19 4.02 4.41   < > 4.57   < > 5.40   MCH pg 28.2 28.4 27.9   < > 27.8   < > 27.6   MCHC g/dL 34.2 34.4 34.4   < > 34.0   < > 33.0   RDW % 13.2 13.2 13.1   < > 13.2   < > 13.4   MPV fL 8.5* 8.4* 8.5*   < > 8.4*   < > 8.2*   NRBC AUTO /100 WBCs  --   --   --   --  0  --  0    < > = values in this interval not displayed.      BMP/CMP:   Results from last 7 days   Lab Units 08/04/23  0515 08/02/23  0501 08/01/23  0536 07/31/23  0741 07/30/23  0503   POTASSIUM mmol/L 3.9 3.8 3.8 3.5 3.9   CHLORIDE mmol/L 105 106 105 108 105   CO2 mmol/L 25 28 26 27 23   BUN mg/dL 12 10 10 11 12   CREATININE mg/dL 0.83 0.69 0.63 0.64 0.78   CALCIUM mg/dL 8.8 8.9 8.7 8.5 8.5   AST U/L  --   --   --  38  --    ALT U/L  --   --   --  57  --    ALK PHOS U/L  --   --   --  73  --    EGFR ml/min/1.73sq m 99 107 111 111 102     Coags:  Results from last 7 days   Lab Units 08/05/23  0547 08/04/23  0515 08/03/23  0456 08/02/23  0501 08/01/23  0536 07/31/23  0741 07/29/23 2029 07/29/23  1042   INR  1.10 1.06  --   --   --   --   --  1.03   PTT seconds  --  89* 86* 82* 63* 68*   < > 51*    < > = values in this interval not displayed. HgbA1c:     Lab Results   Component Value Date    HGBA1C 5.8 (H) 07/21/2023    HGBA1C 6.1 06/15/2019    HGBA1C 6.4 (H) 10/20/2018     Lipid Panel:   No results found for: "CHOL"  Lab Results   Component Value Date    HDL 48 07/21/2023    HDL 40 06/15/2019    HDL 36 (L) 10/20/2018     Lab Results   Component Value Date    HDL 48 07/21/2023    HDL 40 06/15/2019    HDL 36 (L) 10/20/2018     Lab Results   Component Value Date    LDLCALC 117 (H) 07/21/2023    LDLCALC 134 (H) 06/15/2019    LDLCALC 132 (H) 10/20/2018     Lab Results   Component Value Date    TRIG 144 07/21/2023    TRIG 86 06/15/2019    TRIG 95 10/20/2018     Blood Culture: No results found for: "BLOODCX",   Urinalysis: No results found for: "COLORU", "CLARITYU", "SPECGRAV", "PHUR", "LEUKOCYTESUR", "NITRITE", "PROTEINUA", "GLUCOSEU", "Kings Alysha", "Genene Catching", "BLOODU",   Urine Culture: No results found for: "Travis Bailey"  Blood Culture: No results found for: "BLOODCX"  Wound Culure: No results found for: "WOUNDCULT"    Imaging:  CTA chest abdomen pelvis w wo contrast    Result Date: 7/31/2023  Impression 1. No acute aortic pathology. 2.  Numerous groundglass opacities throughout the upper lobes concerning for atypical infectious process. 3.  Redemonstration of a hyperenhancing 7 mm lesion in the pancreatic tail suggestive of a pancreatic neuroendocrine tumor.  There are multiple subcentimeter foci of hyperenhancement also seen in the liver which are compatible with vascular shunting, however metastatic lesions would be a consideration in the context of suspected pancreatic neoplasm. Both findings could be further evaluated with contrast-enhanced MRI abdomen on an outpatient basis. 4.  Right adrenal nodule measuring up to 1.5 cm. Its imaging features are diagnostic of benign adrenal adenoma, and does not need further follow-up or work-up. If there are clinical signs or symptoms of adrenal hyperfunction, biochemical evaluation may be appropriate. Adrenal recommendation based on institutional consensus and Journal of [employer] of Radiology 2017;14:8439-5091. The study was marked in EPIC for significant notification. Resident: Sharon Hylton, the attending radiologist, have reviewed the images and agree with the final report above. Workstation performed: [id number]     CTA abdominal w run off w wo contrast    Result Date: 7/31/2023  Impression 1. Focal mid to distal right SFA occlusion with short interval reconstitution. Patent three-vessel runoff. Vascular surgery is aware of these findings at the time of dictation. 2. 7 mm arterially enhancing lesion within the tail of the pancreas. 3. Cystic lesion within the right kidney measuring up to 2.7 cm, demonstrating mural calcifications. 4. Right adrenal nodule measuring up to 1.5 cm. The above incidental findings are incompletely evaluated on this single phase arterial exam. Recommend further work-up with a multiphase CT versus abdominal MRI / MRCP, with and without contrast. Workstation performed: [id number]     No CTA results available for this patient.           Code Status: Level 1 - Full Code  Advance Directive and Living Will:      Power of :    POLST:        Shagufta Garcia PA-C  8/5/2023

## 2023-08-05 NOTE — DISCHARGE SUMMARY
Discharge Summary - Norah Rojas 46 y.o. male MRN: [id number]    Unit/Bed#: [facility] 503-01 Encounter: [id number]    Admission Date:   Admission Orders (From admission, onward)     Ordered        07/28/23 2312  Inpatient Admission  Once                        Admitting Diagnosis: acute arterial occlusion RLE    HPI: Norah Rojas is a 46 y.o. male with a PMH of tobacco abuse, hypertension, hyperlipidemia, who presents with right lower extremity pain. Patient reports several weeks of right lower extremity pain with ambulation consistent with claudication. Patient underwent Dopplers of lower extremity which were notable for acute arterial occlusion in the mid and distal superficial femoral artery as well as distal anterior tibial artery. Patient was recommended for transfer to vascular for further evaluation. Procedures Performed:   7/29/2023 RLE SFA cutdown, angiogram, viabahn stent to distal SFA, completion angiogram, R anterior/lateral 2-compartment single-incision fasciotomy  8/3 RLE washout, fasciotomy closure      Summary of Hospital Course: Admitted for acute limb-threatening ischemia of RLE, underwent cutdown with stenting and good return of perfusion, required two-compartment fasciotomy. Had some return of RLE function / sensation but still with some residual deficits. Dressing changed initially at bedside but did not tolerate, taken to OR 8/3 for closure of wound which was completed successfully (stapled skin closure). Unable to afford DOAC so started on Coumadin/Plavix, discharged on Lovenox bridge after PT/OT clearance.     Complications: None    Discharge Diagnosis: Acute RLE limb-threatening ischemia    Medical Problems     Resolved Problems  Date Reviewed: 8/4/2023          Resolved    Leukocytosis (leucocytosis) 8/3/2023     Resolved by  Juanpablo Dalton DO          Condition at Discharge: good         Discharge instructions/Information to patient and family:   See after visit summary for information provided to patient and family. Provisions for Follow-Up Care:  See after visit summary for information related to follow-up care and any pertinent home health orders. PCP: Ismael Ly DO    Disposition: Home    Planned Readmission: No      Discharge Statement   I spent 25 minutes discharging the patient. This time was spent on the day of discharge. I had direct contact with the patient on the day of discharge. Additional documentation is required if more than 30 minutes were spent on discharge. Discharge Medications:  See after visit summary for reconciled discharge medications provided to patient and family.

## 2023-08-05 NOTE — PLAN OF CARE
Problem: MOBILITY - ADULT  Goal: Maintain or return to baseline ADL function  Description: INTERVENTIONS:  -  Assess patient's ability to carry out ADLs; assess patient's baseline for ADL function and identify physical deficits which impact ability to perform ADLs (bathing, care of mouth/teeth, toileting, grooming, dressing, etc.)  - Assess/evaluate cause of self-care deficits   - Assess range of motion  - Assess patient's mobility; develop plan if impaired  - Assess patient's need for assistive devices and provide as appropriate  - Encourage maximum independence but intervene and supervise when necessary  - Involve family in performance of ADLs  - Assess for home care needs following discharge   - Consider OT consult to assist with ADL evaluation and planning for discharge  - Provide patient education as appropriate  Outcome: Progressing  Goal: Maintains/Returns to pre admission functional level  Description: INTERVENTIONS:  - Perform BMAT or MOVE assessment daily.   - Set and communicate daily mobility goal to care team and patient/family/caregiver. - Collaborate with rehabilitation services on mobility goals if consulted  - Perform Range of Motion 2 times a day. - Reposition patient every 2 hours.   - Dangle patient 2 times a day  - Stand patient 2 times a day  - Ambulate patient 2 times a day  - Out of bed to chair 2 times a day   - Out of bed for meals 2 times a day  - Out of bed for toileting  - Record patient progress and toleration of activity level   Outcome: Progressing     Problem: Prexisting or High Potential for Compromised Skin Integrity  Goal: Skin integrity is maintained or improved  Description: INTERVENTIONS:  - Identify patients at risk for skin breakdown  - Assess and monitor skin integrity  - Assess and monitor nutrition and hydration status  - Monitor labs   - Assess for incontinence   - Turn and reposition patient  - Assist with mobility/ambulation  - Relieve pressure over bony prominences  - Avoid friction and shearing  - Provide appropriate hygiene as needed including keeping skin clean and dry  - Evaluate need for skin moisturizer/barrier cream  - Collaborate with interdisciplinary team   - Patient/family teaching  - Consider wound care consult   Outcome: Progressing     Problem: PAIN - ADULT  Goal: Verbalizes/displays adequate comfort level or baseline comfort level  Description: Interventions:  - Encourage patient to monitor pain and request assistance  - Assess pain using appropriate pain scale  - Administer analgesics based on type and severity of pain and evaluate response  - Implement non-pharmacological measures as appropriate and evaluate response  - Consider cultural and social influences on pain and pain management  - Notify physician/advanced practitioner if interventions unsuccessful or patient reports new pain  Outcome: Progressing     Problem: INFECTION - ADULT  Goal: Absence or prevention of progression during hospitalization  Description: INTERVENTIONS:  - Assess and monitor for signs and symptoms of infection  - Monitor lab/diagnostic results  - Monitor all insertion sites, i.e. indwelling lines, tubes, and drains  - Monitor endotracheal if appropriate and nasal secretions for changes in amount and color  - [place] appropriate cooling/warming therapies per order  - Administer medications as ordered  - Instruct and encourage patient and family to use good hand hygiene technique  - Identify and instruct in appropriate isolation precautions for identified infection/condition  Outcome: Progressing  Goal: Absence of fever/infection during neutropenic period  Description: INTERVENTIONS:  - Monitor WBC    Outcome: Progressing     Problem: DISCHARGE PLANNING  Goal: Discharge to home or other facility with appropriate resources  Description: INTERVENTIONS:  - Identify barriers to discharge w/patient and caregiver  - Arrange for needed discharge resources and transportation as appropriate  - Identify discharge learning needs (meds, wound care, etc.)  - Arrange for interpretive services to assist at discharge as needed  - Refer to Case Management Department for coordinating discharge planning if the patient needs post-hospital services based on physician/advanced practitioner order or complex needs related to functional status, cognitive ability, or social support system  Outcome: Progressing     Problem: Knowledge Deficit  Goal: Patient/family/caregiver demonstrates understanding of disease process, treatment plan, medications, and discharge instructions  Description: Complete learning assessment and assess knowledge base.   Interventions:  - Provide teaching at level of understanding  - Provide teaching via preferred learning methods  Outcome: Progressing     Problem: MUSCULOSKELETAL - ADULT  Goal: Maintain or return mobility to safest level of function  Description: INTERVENTIONS:  - Assess patient's ability to carry out ADLs; assess patient's baseline for ADL function and identify physical deficits which impact ability to perform ADLs (bathing, care of mouth/teeth, toileting, grooming, dressing, etc.)  - Assess/evaluate cause of self-care deficits   - Assess range of motion  - Assess patient's mobility  - Assess patient's need for assistive devices and provide as appropriate  - Encourage maximum independence but intervene and supervise when necessary  - Involve family in performance of ADLs  - Assess for home care needs following discharge   - Consider OT consult to assist with ADL evaluation and planning for discharge  - Provide patient education as appropriate  Outcome: Progressing  Goal: Maintain proper alignment of affected body part  Description: INTERVENTIONS:  - Support, maintain and protect limb and body alignment  - Provide patient/ family with appropriate education  Outcome: Progressing

## 2023-08-05 NOTE — PROGRESS NOTES
[address] [facility]  Progress Note  Name: Marty Arellano  MRN: [id number]  [facility] Date of Admission: 7/28/2023   Date of Service: 8/5/2023 I Hospital Day: 8    Assessment/Plan   * Arterial occlusion  Assessment & Plan  · Presented with 2 weeks of right lower extremity pain/claudication  · Articular Doppler ultrasound showing acute occlusion of the mid/distal superficial femoral artery. Occlusion of the distal anterior tibial artery. · Transferred to [facility] for evaluation    · Status post right SFA cutdown, balloon angioplasty, stenting, right leg fasciotomy on 7/29  · Post additional washout and fasciotomy closure on 8/3  · Primary management per vascular surgery team  · Continue on heparin drip with bridge to Coumadin, aspirin, statin  · Multi-Podus boots  · Local wound care        Hyperlipidemia, mixed  Assessment & Plan  · Continue statin    Essential hypertension  Assessment & Plan  · Controlled on amlodipine 5 mg daily; continue to monitor  · Blood pressure acceptable     Tobacco dependence  Assessment & Plan  · NRT ordered while inpatient  · Cessation advised    Abnormal chest CT  Assessment & Plan    · CT read as multiple right upper lobe opacity concerning for infectious process likely atypical infection. Patient denies any shortness of breath cough or any respiratory symptoms. Leukocytosis noted but remained afebrile  · Given lack of pulmonary symptoms to monitor off of antibiotics. · Procalcitonin negative    Leukocytosis (leucocytosis)-resolved as of 8/3/2023  Assessment & Plan  · White count noted at 17 K on 7/31  · Patient without any infectious symptoms and afebrile; this is likely reactive secondary to surgery  · Procalcitonin negative  · Continue to monitor off antibiotics  · Improved               VTE Pharmacologic Prophylaxis:   High Risk (Score >/= 5) - Pharmacological DVT Prophylaxis Ordered: enoxaparin (Lovenox).  Sequential Compression Devices Ordered. Patient Centered Rounds: I performed bedside rounds with nursing staff today. Discussions with Specialists or Other Care Team Provider: vascular surgery     Education and Discussions with Family / Patient: Patient declined call to . Total Time Spent on Date of Encounter in care of patient: 35 minutes This time was spent on one or more of the following: performing physical exam; counseling and coordination of care; obtaining or reviewing history; documenting in the medical record; reviewing/ordering tests, medications or procedures; communicating with other healthcare professionals and discussing with patient's family/caregivers. Current Length of Stay: 8 day(s)  Current Patient Status: Inpatient   Certification Statement: The patient will continue to require additional inpatient hospital stay due to per primary team   Discharge Plan: Anticipate discharge in 24-48 hrs to discharge location to be determined pending rehab evaluations. Code Status: Level 1 - Full Code    Subjective:   Patient seen and examined. He has numbness in his toes, no pain. He reports he has not gotten out of bed yet because PT had told him not to bear weight in his current foot boot. Objective:     Vitals:   Temp (24hrs), Av.2 °F (36.8 °C), Min:98.1 °F (36.7 °C), Max:98.5 °F (36.9 °C)    Temp:  [98.1 °F (36.7 °C)-98.5 °F (36.9 °C)] 98.1 °F (36.7 °C)  HR:  [67-75] 75  Resp:  [17-20] 17  BP: (105-137)/(69-71) 135/70  SpO2:  [99 %-100 %] 100 %  Body mass index is 22.01 kg/m². Input and Output Summary (last 24 hours): Intake/Output Summary (Last 24 hours) at 2023 1321  Last data filed at 2023 0815  Gross per 24 hour   Intake 720 ml   Output 575 ml   Net 145 ml       Physical Exam:   Physical Exam  Vitals and nursing note reviewed. Constitutional:       General: He is not in acute distress. Appearance: He is well-developed.    HENT:      Head: Normocephalic and atraumatic. Eyes:      Conjunctiva/sclera: Conjunctivae normal.   Cardiovascular:      Rate and Rhythm: Normal rate and regular rhythm. Heart sounds: No murmur heard. Pulmonary:      Effort: Pulmonary effort is normal. No respiratory distress. Breath sounds: Normal breath sounds. Abdominal:      Palpations: Abdomen is soft. Tenderness: There is no abdominal tenderness. Musculoskeletal:         General: No swelling. Cervical back: Neck supple. Right lower leg: No edema. Left lower leg: No edema. Comments: RLE wrapped in ace bandage    Skin:     General: Skin is warm and dry. Capillary Refill: Capillary refill takes less than 2 seconds. Neurological:      Mental Status: He is alert. Psychiatric:         Mood and Affect: Mood normal.          Additional Data:     Labs:  Results from last 7 days   Lab Units 08/05/23 0547 08/04/23  0515 08/02/23  0501 08/01/23 0536 07/31/23  0741   WBC Thousand/uL 11.46*   < > 11.07*   < > 17.67*   HEMOGLOBIN g/dL 11.8*   < > 12.3   < > 12.7   HEMATOCRIT % 34.5*   < > 35.8*   < > 37.3   PLATELETS Thousands/uL 628*   < > 536*   < > 499*   NEUTROS PCT %  --   --   --   --  63   LYMPHS PCT %  --   --   --   --  29   LYMPHO PCT %  --   --  20  --   --    MONOS PCT %  --   --   --   --  8   MONO PCT %  --   --  5  --   --    EOS PCT %  --   --  1  --  0    < > = values in this interval not displayed. Results from last 7 days   Lab Units 08/05/23 0547 08/01/23 0536 07/31/23  0741   SODIUM mmol/L 137   < > 136   POTASSIUM mmol/L 4.2   < > 3.5   CHLORIDE mmol/L 105   < > 108   CO2 mmol/L 27   < > 27   BUN mg/dL 7   < > 11   CREATININE mg/dL 0.74   < > 0.64   ANION GAP mmol/L 5   < > 1   CALCIUM mg/dL 9.0   < > 8.5   ALBUMIN g/dL  --   --  3.1*   TOTAL BILIRUBIN mg/dL  --   --  0.66   ALK PHOS U/L  --   --  73   ALT U/L  --   --  57   AST U/L  --   --  38   GLUCOSE RANDOM mg/dL 99   < > 112    < > = values in this interval not displayed. Results from last 7 days   Lab Units 08/05/23  0547   INR  1.10     Results from last 7 days   Lab Units 08/03/23  1704   POC GLUCOSE mg/dl 81         Results from last 7 days   Lab Units 08/01/23  0536   PROCALCITONIN ng/ml 0.05       Lines/Drains:  Invasive Devices     Peripheral Intravenous Line  Duration           Peripheral IV 08/05/23 Right;Ventral (anterior) Forearm <1 day                      Imaging: No pertinent imaging reviewed. Recent Cultures (last 7 days):         Last 24 Hours Medication List:   Current Facility-Administered Medications   Medication Dose Route Frequency Provider Last Rate   • acetaminophen  975 mg Oral Q8H [address] Vidal Jackson MD     • amLODIPine  5 mg Oral Daily Vidal Jackson MD     • atorvastatin  40 mg Oral After Adán Landrum MD     • clopidogrel  75 mg Oral Daily Vidal Jackson MD     • enoxaparin  70 mg Subcutaneous Q12H [address] Mimi Mendes PA-C     • gabapentin  300 mg Oral TID Vidal Jackson MD     • hydrALAZINE  10 mg Intravenous Q6H PRN Vidal Jackson MD     • HYDROmorphone  0.5 mg Intravenous Q3H PRN Vidal Jackson MD     • labetalol  5 mg Intravenous Q15 Min PRN Vidal Jackson MD     • methocarbamol  500 mg Oral Q6H [address] Vidal Jackson MD     • nicotine  1 patch Transdermal Daily Vidal Jackson MD     • oxyCODONE  5 mg Oral Q4H PRN Vidal Jackson MD      Or   • oxyCODONE  10 mg Oral Q4H PRN Vidal Jackson MD     • senna  1 tablet Oral HS Vidal Jackson MD     • silver nitrate-potassium nitrate  3 applicator Topical Once Vidal Jackson MD     • warfarin  10 mg Oral Daily (warfarin) Cece Zamora DO          Today, Patient Was Seen By: Joyce Pichardo DO    **Please Note: This note may have been constructed using a voice recognition system. **

## 2023-08-05 NOTE — CASE MANAGEMENT
Case Management Discharge Planning Note    Patient name Kimberlyn Farris  Location [address] [place] [address] Road [facility] [id number] MRN [id number]  : 1970 Date 2023       Current Admission Date: 2023  Current Admission Diagnosis:Arterial occlusion   Patient Active Problem List    Diagnosis Date Noted   • Acute blood loss anemia 2023   • Arterial occlusion 2023   • Pain in right leg 2023   • Hyperlipidemia, mixed 2019   • Prediabetes 2018   • Abnormal chest CT 10/03/2018   • Tobacco dependence 10/03/2018   • Essential hypertension 10/03/2018   • Annual physical exam 10/03/2018      LOS (days): 8  Geometric Mean LOS (GMLOS) (days): 4.30  Days to GMLOS:-3.3     OBJECTIVE:  Risk of Unplanned Readmission Score: 9.49         Current admission status: Inpatient   Preferred Pharmacy:   [address] [place] [address]  Phone: [phone] Fax: [phone]    Primary Care Provider: Marlon Solorio DO    Primary Insurance: [employer]  Secondary Insurance:     DISCHARGE DETAILS:       Other Referral/Resources/Interventions Provided:  Referral Comments: [facility] - notified via Matheus Sorto of pt d/c home today. AVS faxed. Treatment Team Recommendation: Home with [facility]  Discharge Destination Plan[de-identified] Home with [facility] at Discharge : Family                                      Additional Comments: Pt to be d/c home with [facility]. Family to transport pt home.

## 2023-08-05 NOTE — PHYSICAL THERAPY NOTE
Physical Therapy Re-Evaluation and Treatment     Patient's Name: Lalit Dover    Admitting Diagnosis  acute arterial occlusion RLE    Problem List  Patient Active Problem List   Diagnosis    Abnormal chest CT    Tobacco dependence    Essential hypertension    Annual physical exam    Prediabetes    Hyperlipidemia, mixed    Pain in right leg    Arterial occlusion    Acute blood loss anemia       Past Medical History  Past Medical History:   Diagnosis Date    Hypertension     Lung nodule     Prediabetes        Past Surgical History  Past Surgical History:   Procedure Laterality Date    IR LOWER EXTREMITY ANGIOGRAM  7/29/2023    THROMBECTOMY W/ EMBOLECTOMY Right 7/29/2023    Procedure: RIGHT LEG BALLOON ANGIOPLASTY, STENT, FASCIOTOMY;  Surgeon: Maryse Gallagher MD;  Location: [facility];  Service: Vascular    WOUND DEBRIDEMENT Right 8/3/2023    Procedure: DEBRIDEMENT LOWER EXTREMITY ([address]) R LEG FASCIOTOMY WOUND WASHOUT, DEBRIDEMENT, AND POSSIBLE CLOSURE VS VAC PLACEMENT;  Surgeon: Noni Ruiz DO;  Location: [facility];  Service: Vascular        08/05/23 1006   PT Last Visit   PT Visit Date 08/05/23   Note Type   Note type Re-Evaluation   Pain Assessment   Pain Assessment Tool 0-10   Pain Score 6   Pain Location/Orientation Orientation: Right;Location: Leg   Pain Radiating Towards down toward R foot   Pain Onset/Description Onset: Ongoing;Frequency: Constant/Continuous; Descriptor: Throbbing  (increases with weight bearing)   Effect of Pain on Daily Activities limited weight bearing/gait   Patient's Stated Pain Goal No pain   Hospital Pain Intervention(s) Repositioned; Ambulation/increased activity; Emotional support  (RN aware and addressing)   Restrictions/Precautions   Weight Bearing Precautions Per Order Yes   RLE Weight Bearing Per Order WBAT  (Per vascular team WBAT)   Braces or Orthoses Other (Comment)  (multipodus boot to be worn in bed)   Other Precautions Pain; Fall Risk;WBS   Home Living Additional Comments See home set up per IE 7/31   Prior Function   Comments see PLOF per IE 7/31   General   Family/Caregiver Present No   Cognition   Overall Cognitive Status WFL   Arousal/Participation Alert   Attention Within functional limits   Orientation Level Oriented X4   Memory Within functional limits   Following Commands Follows all commands and directions without difficulty   Comments patient frustrated but cooperative with therapy. Subjective   Subjective Patient agreeable to therapy, but expresses frustrations at miscommunications regarding ambulation ability and WBS. RUE Assessment   RUE Assessment WFL   LUE Assessment   LUE Assessment WFL   RLE Assessment   RLE Assessment X  (decreased mobility and strength unable to be tested d/t presence of high pain level. Patient able to minimally bear weight, patient noting about "25% of weight")   LLE Assessment   LLE Assessment WFL   Bed Mobility   Supine to Sit 6  Modified independent   Sit to Supine 6  Modified independent   Transfers   Sit to Stand 6  Modified independent  (MI while RLE NWB)   Stand to Sit 6  Modified independent  (MI while RLE NWB)   Additional Comments patient performs transfers at crutches while maintaining NWB due to pain   Ambulation/Elevation   Gait pattern Antalgic; Forward Flexion; Short stride; Step to;Excessively slow   Gait Assistance   (CGA-SBA for WBing RLE ; MI for NWB RLE)   Additional items Verbal cues   Assistive Device Crutches   Distance 5'   Ambulation/Elevation Additional Comments Patient able to ambulate MI with crutches while maintaining RLE NWB utilizing 2 point gait pattern, patient requires CGA-SBA for WBing RLE utilizing 3 point gait pattern. Patient notes comfortability with crutches and NWB but is agreeable to education regarding increasing WB in RLE. Patient recommended RW for increased safety and stability, and appears receptive and agreeable.  patient plans to perform NWB stair climbing to access home on D/C but will attempt to increase WBAT in RLE while within home environment   Balance   Static Sitting Normal   Dynamic Sitting Good   Static Standing Fair +   Dynamic Standing Poor +   Ambulatory Fair   Endurance Deficit   Endurance Deficit Yes   Endurance Deficit Description limited by pain   Activity Tolerance   Activity Tolerance Patient limited by pain   Medical Staff Made Aware OT ; RN updated on pain level ; interdisciplinary team updated on current function and current needs   Nurse Made Aware RN cleared   Assessment   Prognosis Good   Problem List Impaired balance;Decreased mobility;Pain;Decreased strength   Assessment Pt is a 46 y.o. male seen for a re-evaluation s/p debridement and fasciotomy closure on 8/3/23. Patient was originally admitted to [address] [place] [address] on 7/28/2023 for acute arterial occlusion RLE. Patient  has a past medical history of Hypertension, Lung nodule, and Prediabetes. .     Please see initial evaluation for PLOF and home set up. Currently pt requires MI for bed mobility, MI for functional transfers with crutches while utilizing NWB RLE ; patient able to ambulate RLE NWB with MI using crutches, but requires CGA-SBA while WBing RLE using crutches. Patient has difficulty ambulating due to pain requiring decreased WBing. Patient self reporting about 25% WBing allowed during ambulation. Patient only ambulates about 5' prior to need for rest d/t high pain level. Pt continues to function below baseline and w/ overall mobility deficits 2* to: decreased strength, decreased endurance, decreased mobility, impaired balance, and difficulty with normalized mobility. These impairments place pt at risk for falls. Pt will continue to benefit from skilled PT interventions to address stated impairments; to maximize functional potential; for ongoing pt/family education; and DME needs. The patient's AM-PAC Basic Mobility Inpatient Short Form Raw Score Is 22.  A Raw score of greater than or equal to 16 suggests the patient may benefit from discharge to home. PT is currently recommending home with OPPT on d/c from hospital. Patient is safe to return home while utilizing NWB using crutches, but is recommended to continue to WBAT on RLE within home environment. Outpatient PT services are recommended on d/c to continue strengthening, improving balance, and normalizing gait. Pt is also recommended to utilize RW vs crutches for improved stability and safety with ambulation. Will continue to follow as able. Goals   Patient Goals to go home soon and be in less pain   STG Expiration Date 08/19/23   Short Term Goal #1 In 14 days, patient will    1) increase strength in BUE/BLE by 1/2 to 1 full grade for increased strength and stability needed for functional mobility 2) ambulate 250ft with MI using LRAD with increased WBing on RLE for increased endurance and safety ambulating home and community environments 3) improve balance by 1 grade for improved safety and stability and decreased risk for falls. 4) ascend/descend 5 stairs using HR with step to pattern with MI in order to safely enter home   PT Treatment Day 2   Plan   Treatment/Interventions Functional transfer training;LE strengthening/ROM; Elevations; Therapeutic exercise; Endurance training;Equipment eval/education;Gait training;Spoke to nursing;Spoke to case management   PT Frequency 3-5x/wk   Recommendation   PT Discharge Recommendation Home with outpatient rehabilitation  (for RLE on d/c)   Equipment Recommended Walker;Crutches  (owns)   AM-PAC Basic Mobility Inpatient   Turning in Flat Bed Without Bedrails 4   Lying on Back to Sitting on Edge of Flat Bed Without Bedrails 4   Moving Bed to Chair 4   Standing Up From Chair Using Arms 4   Walk in Room 3   Climb 3-5 Stairs With Railing 3   Basic Mobility Inpatient Raw Score 22   Basic Mobility Standardized Score 47.4   Highest Level Of Mobility   JH-HLM Goal 7: Walk 25 feet or more   JH-HLM Achieved 6: Walk 10 steps or more   Modified [place] Scale   Modified [place] Scale 2   Additional Treatment Session   Start Time 6520   End Time 1006   Treatment Assessment Increased education regarding WBS and use of multipodus boot. Standing weight shifting and balance exercies to help increase WBing in RLE. Patient also educated on and provided written copy of therapeutic exercies to perform while in hospital and on return home. Pt would continue to benefit from PT services while in inpatient setting to increase WB tolerance and increase functional independence prior to return home. Patient recommended OPPT on d/c to build upon strength and gait stability. Equipment Use crutches used, but educated to utilizes RW for increased safety and stability. Exercises   Balance training  standing balance good, with weight bearing balance decreases due to pain. patient able to tolerate about 25% subjective WBing on RLE. End of Consult   Patient Position at End of Consult Supine; All needs within reach   End of Consult Comments Patient educated on HEP, safety, AD use and recommendation.            Salvador Uriostegui, PT, DPT

## 2023-08-05 NOTE — NURSING NOTE
Patient given discharge instructions and reviewed with the patient and family member in room. All parties understanding of the discharge instructions provided to the patient. IV removed. All belongings sent with patient. Patient left facility via wheelchair and PCA assistance.

## 2023-08-05 NOTE — PLAN OF CARE
Problem: PHYSICAL THERAPY ADULT  Goal: Performs mobility at highest level of function for planned discharge setting. See evaluation for individualized goals. Description: Treatment/Interventions: Functional transfer training, LE strengthening/ROM, Therapeutic exercise, Endurance training, Patient/family training, Equipment eval/education, Bed mobility, Gait training, OT, Spoke to nursing  Equipment Recommended: Chester Molina       See flowsheet documentation for full assessment, interventions and recommendations. Note: Prognosis: Good  Problem List: Impaired balance, Decreased mobility, Pain, Decreased strength  Assessment: Pt is a 46 y.o. male seen for a re-evaluation s/p debridement and fasciotomy closure on 8/3/23. Patient was originally admitted to [address] [place] [address] on 7/28/2023 for acute arterial occlusion RLE. Patient  has a past medical history of Hypertension, Lung nodule, and Prediabetes. .     Please see initial evaluation for PLOF and home set up. Currently pt requires MI for bed mobility, MI for functional transfers with crutches while utilizing NWB RLE ; patient able to ambulate RLE NWB with MI using crutches, but requires CGA-SBA while WBing RLE using crutches. Patient has difficulty ambulating due to pain requiring decreased WBing. Patient self reporting about 25% WBing allowed during ambulation. Patient only ambulates about 5' prior to need for rest d/t high pain level. Pt continues to function below baseline and w/ overall mobility deficits 2* to: decreased strength, decreased endurance, decreased mobility, impaired balance, and difficulty with normalized mobility. These impairments place pt at risk for falls. Pt will continue to benefit from skilled PT interventions to address stated impairments; to maximize functional potential; for ongoing pt/family education; and DME needs. The patient's AM-PAC Basic Mobility Inpatient Short Form Raw Score Is 22.  A Raw score of greater than or equal to 16 suggests the patient may benefit from discharge to home. PT is currently recommending home with OPPT on d/c from hospital. Patient is safe to return home while utilizing NWB using crutches, but is recommended to continue to WBAT on RLE within home environment. Outpatient PT services are recommended on d/c to continue strengthening, improving balance, and normalizing gait. Pt is also recommended to utilize RW vs crutches for improved stability and safety with ambulation. Will continue to follow as able. PT Discharge Recommendation: Home with outpatient rehabilitation (for RLE on d/c)    See flowsheet documentation for full assessment.

## 2023-08-05 NOTE — OCCUPATIONAL THERAPY NOTE
Occupational Therapy Re-Evaluation     Brian Balbuena    8/5/2023    Principal Problem:    Arterial occlusion  Active Problems:    Abnormal chest CT    Tobacco dependence    Essential hypertension    Hyperlipidemia, mixed    Acute blood loss anemia      @hPMHl@    Past Surgical History:   Procedure Laterality Date    IR LOWER EXTREMITY ANGIOGRAM  7/29/2023    THROMBECTOMY W/ EMBOLECTOMY Right 7/29/2023    Procedure: RIGHT LEG BALLOON ANGIOPLASTY, STENT, FASCIOTOMY;  Surgeon: Jey Stone MD;  [facility]: [facility];  Service: Vascular    WOUND DEBRIDEMENT Right 8/3/2023    Procedure: DEBRIDEMENT LOWER EXTREMITY ([facility] OUT) R LEG FASCIOTOMY WOUND WASHOUT, DEBRIDEMENT, AND POSSIBLE CLOSURE VS VAC PLACEMENT;  Surgeon: Vincenzo Tomas DO;  [facility];  Service: Vascular        08/05/23 0954   OT Last Visit   OT Visit Date 08/05/23   Note Type   Note type Re-Evaluation   Pain Assessment   Pain Assessment Tool 0-10   Pain Score 6   Pain Location/Orientation Orientation: Right;Location: Foot   Pain Onset/Description Onset: Ongoing  (pain increases w/ weighbearing through RLE)   Effect of Pain on Daily Activities Pt unable to fully weightbear through RLE   Patient's Stated Pain Goal No pain   Hospital Pain Intervention(s) Repositioned; Ambulation/increased activity; Emotional support   Restrictions/Precautions   Weight Bearing Precautions Per Order Yes   RLE Weight Bearing Per Order (S)  WBAT  (per vascular team, communicated via tiger text connect)   Braces or Orthoses Other (Comment)  (multi podus boot when in bed)   Other Precautions Multiple lines; Fall Risk;Pain;WBS   Home Living   Additional Comments Please refer to initial eval 7/31/23   Prior Function   Comments Please refer to initial eval 7/31/23   Lifestyle   Autonomy I with ADLS and IADLS +    Reciprocal Relationships supportive family however reports his wife is able to provide limited support as she has MS   Service to Others works FT as an    Intrinsic Gratification Working on cars   ADL   Where Assessed Edge of bed   [place] [address]  [address] 5  [address] [facility] 4  [address] [facility] 5  [address] [facility] 4  [address] [facility]  4  Minimal Assistance   Bed Mobility   Supine to Sit 6  Modified independent   Additional items Bedrails;HOB elevated   Sit to Supine Unable to assess   Additional Comments Pt laying supine in bed upon OT arrival. Pt seated at EOB w/ all needs in reach s/p OT session   Transfers   Sit to Stand 6  Modified independent   Additional items Assist x 1;Bedrails   Stand to Sit 6  Modified independent   Additional items Assist x 1;Bedrails   Additional Comments Pt requesting to transfer w/ crutches. Pt performed STS transfer keeping RLE NWB - Pt stating he is unable to WB through RLE during transfers. Functional Mobility   Functional Mobility 4  Minimal assistance  (CGA)   Additional Comments Pt demostrated ability to perform short distance ambulation w/ crutches - CGA w/ WBing through RLE (Pt reports being able to only WB RLE ~25% due to significant pain). Pt able to perform short distance ambulation w/ crutches at Mod I level while maintaining NWB (Pt's preference due to pain). Additional items Crutches   Balance   Static Sitting Good   Dynamic Sitting Fair +   Static Standing Fair   Dynamic Standing Fair -   Ambulatory Fair -   Activity Tolerance   Activity Tolerance Patient limited by pain   Medical Staff Made Aware PT Liam Milian; Pt seen as a co-session due to the patient's co-morbidities, clinically unstable presentation, and present impairments which are a regression from the patient's baseline.      Nurse Made Aware PRABHJOT Randle cleared Pt for OT session   RUE Assessment   RUE Assessment WFL   LUE Assessment   LUE Assessment WFL   Hand Function Gross Motor Coordination Functional   Sensation   Light Touch Severe deficits in the RLE  (dorsal aspect of R foot; "It's always numb")   Cognition   Overall Cognitive Status WFL   Arousal/Participation Alert; Cooperative   Attention Attends with cues to redirect   Orientation Level Oriented X4   Memory Within functional limits   Following Commands Unable to follow one step commands   Comments Pt presents w/ frustration due to the inconsistent NWB vs WBAT recommendations that he has been told. Pt agreeable and cooperative w/ therapy w/ increased education/clarification on WBS. Pt requires VC for safety awareness t/o session   Assessment   Limitation Decreased ADL status; Decreased Safe judgement during ADL;Decreased endurance;Decreased high-level ADLs   Prognosis Good   Assessment Pt is a 45 yo male seen for OT re-eval s/p fasciotomy closure. Pt with active OT orders and activity as tolerated orders. Please refer to initial eval 7/31/23 for home set up/PLOF. Per vascular team via tiger text, Pt to maintain WBAT RLE. Pt is currently demonstrating the following occupational deficits: S UB self care, Min A LB self care, CGA ambulation w/ WBAT RLE and crutches vs Mod I ambulation w/ NWB RLE (Pt reports he prefers this due to RLE pain). These deficits that are impacting pt's baseline areas of occupation are a result of the following impairments: pain, endurance, activity tolerance, functional mobility, decreased I w/ ADLS/IADLS, strength and decreased safety awareness. The following Occupational Performance Areas to address include: grooming, bathing/shower, toilet hygiene, dressing, health maintenance, functional mobility and clothing management. Recommend home with family support and outpatient therapy for RLE upon D/C. The patient's raw score on the AM-PAC Daily Activity Inpatient Short Form is 22. A raw score of greater than or equal to 19 suggests the patient may benefit from discharge to home.  Please refer to the recommendation of the Occupational Therapist for safe discharge planning. Pt to continue to benefit from acute immediate OT services to address the following goals 2-3x/week to  w/in 10-14 days:   Goals   Patient Goals To return home today   LTG Time Frame 7-10   Long Term Goal #1 Refer to goals below   Plan   Treatment Interventions ADL retraining;Functional transfer training;UE strengthening/ROM; Endurance training;Patient/family training;Equipment evaluation/education; Compensatory technique education; Energy conservation; Activityengagement   Goal Expiration Date 08/15/23   OT Frequency 2-3x/wk   Recommendation   OT Discharge Recommendation No rehabilitation needs  (outpatient therapy)   AM-PAC Daily Activity Inpatient   Lower Body Dressing 3   Bathing 3   Toileting 4   Upper Body Dressing 4   Grooming 4   Eating 4   Daily Activity Raw Score 22   Daily Activity Standardized Score (Calc for Raw Score >=11) 47. 1   AM-PAC Applied Cognition Inpatient   Following a Speech/Presentation 4   Understanding Ordinary Conversation 4   Taking Medications 4   Remembering Where Things Are Placed or Put Away 4   Remembering List of 4-5 Errands 4   Taking Care of Complicated Tasks 4   Applied Cognition Raw Score 24   Applied Cognition Standardized Score 62.21         GOALS    1) Pt will improve activity tolerance to G for min 30 min txment sessions for increase engagement in functional tasks    2) Pt will complete UB/LB dressing/self care w/ mod I using adaptive device and DME as needed    3) Pt will complete bathing w/ Mod I w/ use of AE and DME as needed    4) Pt will complete toileting w/ mod I w/ G hygiene/thoroughness using DME as needed    5) Pt will improve functional transfers to Mod I on/off all surfaces using DME as needed w/ G balance/safety     6) Pt will improve functional mobility during ADL/IADL/leisure tasks to Mod I using DME as needed w/ G balance/safety     7) Pt will participate in simulated IADL management task to increase independence to Mod I w/ G safety and endurance    8) Pt will be attentive 100% of the time during ongoing cognitive assessment w/ G participation to assist w/ safe d/c planning/recommendations    9) Pt will demonstrate G carryover of pt/caregiver education and training as appropriate w/o cues w/ good tolerance to increase safety during functional tasks    10) Pt will demonstrate 100% carryover of energy conservation techniques t/o functional I/ADL/leisure tasks w/o cues s/p skilled education to increase endurance during functional tasks           Antonietta Olivia MS, OTR/L

## 2023-08-07 ENCOUNTER — TRANSITIONAL CARE MANAGEMENT (OUTPATIENT)
Dept: FAMILY MEDICINE CLINIC | Facility: CLINIC | Age: 53
End: 2023-08-07

## 2023-08-07 ENCOUNTER — OFFICE VISIT (OUTPATIENT)
Dept: FAMILY MEDICINE CLINIC | Facility: CLINIC | Age: 53
End: 2023-08-07
Payer: COMMERCIAL

## 2023-08-07 ENCOUNTER — TELEPHONE (OUTPATIENT)
Dept: OBGYN CLINIC | Facility: CLINIC | Age: 53
End: 2023-08-07

## 2023-08-07 ENCOUNTER — TELEPHONE (OUTPATIENT)
Dept: VASCULAR SURGERY | Facility: CLINIC | Age: 53
End: 2023-08-07

## 2023-08-07 VITALS
OXYGEN SATURATION: 98 % | HEART RATE: 90 BPM | BODY MASS INDEX: 22.14 KG/M2 | DIASTOLIC BLOOD PRESSURE: 72 MMHG | WEIGHT: 149.5 LBS | SYSTOLIC BLOOD PRESSURE: 124 MMHG | TEMPERATURE: 98.1 F | HEIGHT: 69 IN

## 2023-08-07 DIAGNOSIS — M79.604 PAIN IN RIGHT LEG: ICD-10-CM

## 2023-08-07 DIAGNOSIS — K86.9 PANCREATIC LESION: ICD-10-CM

## 2023-08-07 DIAGNOSIS — M62.831 MUSCLE SPASM OF CALF: ICD-10-CM

## 2023-08-07 DIAGNOSIS — I70.90 ARTERIAL OCCLUSION: Primary | ICD-10-CM

## 2023-08-07 DIAGNOSIS — G47.00 INSOMNIA, UNSPECIFIED TYPE: ICD-10-CM

## 2023-08-07 PROCEDURE — 99496 TRANSJ CARE MGMT HIGH F2F 7D: CPT | Performed by: STUDENT IN AN ORGANIZED HEALTH CARE EDUCATION/TRAINING PROGRAM

## 2023-08-07 RX ORDER — TRAZODONE HYDROCHLORIDE 50 MG/1
50 TABLET ORAL
Qty: 30 TABLET | Refills: 0 | Status: SHIPPED | OUTPATIENT
Start: 2023-08-07

## 2023-08-07 RX ORDER — METHOCARBAMOL 500 MG/1
500 TABLET, FILM COATED ORAL 4 TIMES DAILY
Qty: 30 TABLET | Refills: 0 | Status: SHIPPED | OUTPATIENT
Start: 2023-08-07 | End: 2023-08-13 | Stop reason: SDUPTHER

## 2023-08-07 NOTE — ASSESSMENT & PLAN NOTE
Follow-up appointment with vascular surgery on 8/15. Also has appointment with hematology on 8/17. patient to continue atorvastatin, Plavix and Coumadin. She reports that he has started smoking again but does plan to stop once he receives nicotine patches from the pharmacy. Has visiting nurses and PT set up at home.

## 2023-08-07 NOTE — ASSESSMENT & PLAN NOTE
Dental finding of pancreatic lesion on inpatient CT abdomen and pelvis. We will we will check MRI abdomen for further evaluation and to rule out malignancy.

## 2023-08-07 NOTE — TELEPHONE ENCOUNTER
----- Message from Gloria Ndiaye sent at 8/7/2023  7:49 AM EDT -----  Patient is being discharged from their inpatient hospitalization today. Patients is insured by [employer] and requires a follow up TCM appointment within 7 days of discharge. Please contact this patient to schedule appointment.      Thank you    Inpatient Care Management

## 2023-08-07 NOTE — PROGRESS NOTES
Assessment & Plan     1. Arterial occlusion  Assessment & Plan:  Follow-up appointment with vascular surgery on 8/15. Also has appointment with hematology on 8/17. patient to continue atorvastatin, Plavix and Coumadin. She reports that he has started smoking again but does plan to stop once he receives nicotine patches from the pharmacy. Has visiting nurses and PT set up at home. 2. Pain in right leg    3. Muscle spasm of calf  -     methocarbamol (ROBAXIN) 500 mg tablet; Take 1 tablet (500 mg total) by mouth 4 (four) times a day    4. Insomnia, unspecified type  Assessment & Plan:  Trial trazodone 50 mg nightly. Orders:  -     traZODone (DESYREL) 50 mg tablet; Take 1 tablet (50 mg total) by mouth daily at bedtime    5. Pancreatic lesion  Assessment & Plan:  Dental finding of pancreatic lesion on inpatient CT abdomen and pelvis. We will we will check MRI abdomen for further evaluation and to rule out malignancy. Orders:  -     MRI abdomen w wo contrast; Future; Expected date: 08/07/2023       Subjective     Transitional Care Management Review:   Kimberlyn Farris is a 46 y.o. male here for TCM follow up. During the TCM phone call patient stated:  TCM Call     Date and time call was made  8/7/2023  6:50 AM    Hospital care reviewed  Records reviewed    Patient was hospitial[facility] at  [facility]    Date of Admission  07/28/23    Date of discharge  08/05/23    Disposition  Home    Current Symptoms  None      TCM Call     Post hospital issues  None    Scheduled for follow up?   Yes    Did you obtain your prescribed medications  Yes    Do you need help managing your prescriptions or medications  No    Is transportation to your appointment needed  No    I have advised the patient to call PCP with any new or worsening symptoms  Jalyn Keyes MA    Living Arrangements  Spouse or Significiant other    Counseling  Patient        She reports that he is still in a lot of pain, unable to put any pressure on his right leg and still ambulating with walker. Reports that he has been unable to sleep secondary to pain       Review of Systems   Constitutional: Negative for chills and fever. HENT: Negative for congestion and rhinorrhea. Respiratory: Negative for cough and shortness of breath. Cardiovascular: Negative for chest pain and palpitations. Gastrointestinal: Negative for abdominal pain, constipation, diarrhea, nausea and vomiting. Musculoskeletal: Positive for gait problem. Right leg pain   Neurological: Negative for dizziness and headaches. Objective     /72 (BP Location: Left arm, Patient Position: Sitting, Cuff Size: Adult)   Pulse 90   Temp 98.1 °F (36.7 °C) (Temporal)   Ht 5' 9" (1.753 m)   Wt 67.8 kg (149 lb 8 oz)   SpO2 98%   BMI 22.08 kg/m²      Physical Exam  Vitals reviewed. Constitutional:       Appearance: Normal appearance. HENT:      Head: Normocephalic and atraumatic. Eyes:      Extraocular Movements: Extraocular movements intact. Cardiovascular:      Rate and Rhythm: Normal rate and regular rhythm. Heart sounds: Normal heart sounds. Pulmonary:      Effort: Pulmonary effort is normal.      Breath sounds: Normal breath sounds. Musculoskeletal:      Comments: Right lower leg wrapped in gauze and Ace bandage. Neurological:      General: No focal deficit present. Mental Status: He is alert and oriented to person, place, and time.       Comments: Ambulating with walker   Psychiatric:         Mood and Affect: Mood normal.         Behavior: Behavior normal.          Medications have been reviewed by provider in current encounter    David Landon DO

## 2023-08-07 NOTE — TELEPHONE ENCOUNTER
Patient to be discharged from hospital on Lovenox 70 mgm  q 12 hours and 5 mgm of coumadin daily and get a protime on Tues or Wed.

## 2023-08-08 ENCOUNTER — TELEPHONE (OUTPATIENT)
Dept: VASCULAR SURGERY | Facility: CLINIC | Age: 53
End: 2023-08-08

## 2023-08-08 ENCOUNTER — TELEPHONE (OUTPATIENT)
Dept: HEMATOLOGY ONCOLOGY | Facility: CLINIC | Age: 53
End: 2023-08-08

## 2023-08-08 ENCOUNTER — APPOINTMENT (OUTPATIENT)
Dept: LAB | Facility: CLINIC | Age: 53
End: 2023-08-08
Payer: COMMERCIAL

## 2023-08-08 DIAGNOSIS — M79.604 PAIN IN RIGHT LEG: ICD-10-CM

## 2023-08-08 LAB — PROTHROMBIN TIME: >120 SECONDS (ref 11.6–14.5)

## 2023-08-08 PROCEDURE — 85610 PROTHROMBIN TIME: CPT

## 2023-08-08 PROCEDURE — 36415 COLL VENOUS BLD VENIPUNCTURE: CPT

## 2023-08-08 NOTE — TELEPHONE ENCOUNTER
Vascular Nurse Navigator Post Op Call    Procedure: Right - RIGHT SFA CUTDOWN, RIGHT LEG ANGIOGRAM, RIGHT LEG BALLOON ANGIOPLASTY / General Fought. RIGHT LEG FASCIOTOMY    Date of Procedure: 7/29/23    Surgeon:    Karin Pozo MD - Primary     * Ronit Luke MD - Assisting     * Cecilia Lamb DO - Jose Elias    Procedure: Right - DEBRIDEMENT LOWER EXTREMITY [facility] OUT) R LEG FASCIOTOMY WOUND WASHOUT. DEBRIDEMENT. AND POSSIBLE CLOSURE VS VAC PLACEMENT    Date of Procedure: 8/3/23    Surgeon:    Gricelda Yeh DO - Primary     * Flora Villalpando MD - Assisting    Discharge Date:  8/5/23    Discharge Disposition: Home with [facility]     Leg Weakness?: No    Leg Swelling?: No    Leg Numbness?: No    Chest Pain?: No    Shortness of Breath?: No    Orthopnea?: No    Anticoagulation pt was discharged on post op?: Warfarin (Coumadin or Daniella Schlatter), Clopidogrel (Plavix) and Lovenox    Statin pt was discharged on post op?:  Lipitor (atorvastatin)    Bleeding?: No    Uncontrolled Pain?: No    Incision Concerns?: No    Fever or Chills?: No      Reviewed discharge instructions and incision care with patient. NEXT OFFICE VISIT SCHEDULED:  8/15/23 at 11 am with You Enriquez PA-C at [facility] [address] [place] [address]    Transportation Confirmed?: Yes      Any further questions/concerns? Patient stated that he is doing good since discharge. He stated that he is having pain and is taking Tylenol for his pain. He stated that he still has the numbness and tingling in his leg/foot. He stated this remains the same as when he was in the hospital.  Denies color or temperature change to his leg/foot. He stated the nurse from the A was out to see him and assess his incisions and stated they look good and with out any signs of infection. Reviewed incision care with him - wash daily with soap and water. Reviewed discharge medications - Lovenox, Coumadin, and Plavix. All questions answered.   No concerns expressed at this time.

## 2023-08-08 NOTE — TELEPHONE ENCOUNTER
I called Florence Villanueva in response to a referral that was received for patient to establish care with Hematology. Outreach was made to schedule a consultation. .    the phone call was completed, the patient states he needs to be seen before 08/15/2023 and when I informed him there were no appointments before that date he states he will contact the referring provider. The patient states he will give us a call back after he discusses this with his referring provider.

## 2023-08-09 ENCOUNTER — APPOINTMENT (OUTPATIENT)
Dept: LAB | Facility: HOSPITAL | Age: 53
End: 2023-08-09
Payer: COMMERCIAL

## 2023-08-09 ENCOUNTER — ANTICOAG VISIT (OUTPATIENT)
Dept: VASCULAR SURGERY | Facility: CLINIC | Age: 53
End: 2023-08-09

## 2023-08-09 ENCOUNTER — TELEPHONE (OUTPATIENT)
Dept: VASCULAR SURGERY | Facility: CLINIC | Age: 53
End: 2023-08-09

## 2023-08-09 DIAGNOSIS — Z79.01 CURRENT USE OF LONG TERM ANTICOAGULATION: ICD-10-CM

## 2023-08-09 DIAGNOSIS — I70.90 ARTERIAL OCCLUSION: ICD-10-CM

## 2023-08-09 DIAGNOSIS — Z79.01 CURRENT USE OF LONG TERM ANTICOAGULATION: Primary | ICD-10-CM

## 2023-08-09 DIAGNOSIS — I70.90 ARTERIAL OCCLUSION: Primary | ICD-10-CM

## 2023-08-09 DIAGNOSIS — M79.604 PAIN IN RIGHT LEG: ICD-10-CM

## 2023-08-09 LAB
INR PPP: 1.29 (ref 0.84–1.19)
PROTHROMBIN TIME: 15.8 SECONDS (ref 11.6–14.5)

## 2023-08-09 PROCEDURE — 36415 COLL VENOUS BLD VENIPUNCTURE: CPT

## 2023-08-09 PROCEDURE — 85610 PROTHROMBIN TIME: CPT

## 2023-08-09 RX ORDER — WARFARIN SODIUM 5 MG/1
TABLET ORAL
Qty: 90 TABLET | Refills: 0 | Status: SHIPPED | OUTPATIENT
Start: 2023-08-09

## 2023-08-09 NOTE — PROGRESS NOTES
Spoke w/ pt. He already took 5 mg coumadin this morning. Advised him to take another 5 mg today to equal 10 mg. Tomorrow he should take 7.5 mg. He should continue lovenox. Pt will test INR Friday morning at [facility] [employer].

## 2023-08-09 NOTE — TELEPHONE ENCOUNTER
Protime result >120. INR - unable to calculate. Called [facility]'s lab and spoke with Vanita Altamirano. Per Vanita Altamirano, the protime result is the upper limit of what the analyzer can calculate. Therefore, an INR value could not be calculated. Suggested that a specimen be recollected to confirm. Called pt to see how he is feeling. Pt denies any S/S of bleeding. Denies blood in stool, blood in urine, denies nosebleeds or bleeding gums. Pt has been taking warfarin 5 mg and Lovenox 70 mg BID. Called triage provider, Roxann Cai. Per Tanja Cockayne, repeat PT/INR stat. Ask pt to go to a different lab, if possible. Can also start the process for a home INR meter, if going to lab in the future will be difficult for pt. Called pt. He will be going to [facility] this morning to have the STAT PT/INR redrawn.

## 2023-08-09 NOTE — TELEPHONE ENCOUNTER
Pt's wife called. When discharged from the hospital, pt was only given a script for #5 Coumadin tabs. Vascular office just started managing his coumadin dosing. Pt will need a script for Coumadin 5 mg sent to [facility].

## 2023-08-09 NOTE — TELEPHONE ENCOUNTER
See anticoag encounter. Pt had INR retested and it is 1.29. Dosed per protocol. Pt will retest Friday morning at [place].

## 2023-08-09 NOTE — PROGRESS NOTES
Protime result >120. INR - unable to calculate. Called [facility]'s lab and spoke with Anselmo Bull. Per Anselmo Bull, the protime result is the upper limit of what the analyzer can calculate. Therefore, an INR value could not be calculated. Suggested that a specimen be recollected to confirm. Called pt to see how he is feeling. Pt denies any S/S of bleeding. Denies blood in stool, blood in urine, denies nosebleeds or bleeding gums. Pt has been taking warfarin 5 mg and Lovenox 70 mg BID. Called triage provider, Maureen Cristina. Per Jeevan Sandoval, repeat PT/INR stat. Ask pt to go to a different lab, if possible. Can also start the process for a home INR meter, if going to lab in the future will be difficult for pt. Called pt. He will be going to [facility] this morning to have the STAT PT/INR redrawn.

## 2023-08-11 ENCOUNTER — ANTICOAG VISIT (OUTPATIENT)
Dept: VASCULAR SURGERY | Facility: CLINIC | Age: 53
End: 2023-08-11

## 2023-08-11 ENCOUNTER — APPOINTMENT (OUTPATIENT)
Dept: LAB | Facility: HOSPITAL | Age: 53
End: 2023-08-11
Payer: COMMERCIAL

## 2023-08-11 ENCOUNTER — TELEPHONE (OUTPATIENT)
Dept: HEMATOLOGY ONCOLOGY | Facility: CLINIC | Age: 53
End: 2023-08-11

## 2023-08-11 ENCOUNTER — TELEPHONE (OUTPATIENT)
Dept: VASCULAR SURGERY | Facility: CLINIC | Age: 53
End: 2023-08-11

## 2023-08-11 LAB
INR PPP: 1.86 (ref 0.84–1.19)
PROTHROMBIN TIME: 21 SECONDS (ref 11.6–14.5)

## 2023-08-11 PROCEDURE — 85610 PROTHROMBIN TIME: CPT

## 2023-08-11 PROCEDURE — 36415 COLL VENOUS BLD VENIPUNCTURE: CPT

## 2023-08-11 NOTE — TELEPHONE ENCOUNTER
Appointment Change  Cancel, Reschedule, Change to Virtual      Who are you speaking with? Patient   If it is not the patient, are they listed on an active communication consent form? N/A   Which provider is the appointment scheduled with? Dr. Kayla Roldan   When is the appointment scheduled? Please list date and time 08/17/23 2:45PM   At which location is the appointment scheduled to take place? [place] ([address]   Was the appointment rescheduled or changed from an in person visit to a virtual visit? If so, please list the details of the change. 08/17/23 1PM   What is the reason for the appointment change? Provider in [address] [place] [address]   Was [facility] transport scheduled for this visit? No   Does [facility] transport need to be scheduled for the new visit (if applicable) N/A   Does the patient need an infusion appointment rescheduled? No   Does the patient have an infusion appointment scheduled? If so, when? No   Is the patient undergoing chemotherapy? No   Was the no-show policy reviewed for appointments being changed with less then 24 hours of notice?  N/A

## 2023-08-11 NOTE — PROGRESS NOTES
Advised that pt take one more dose of Lovenox tonight. Take Coumadin 10 mg today, 10 mg tomorrow, and 7.5 mg on Sunday. Recheck INR on Monday.

## 2023-08-11 NOTE — TELEPHONE ENCOUNTER
Pt expressed interest in obtaining a home INR meter. This was recommended by provider on 8/9/23. Completed the home meter form for [employer]. Placed in provider's bin for signature.

## 2023-08-13 DIAGNOSIS — G47.00 INSOMNIA, UNSPECIFIED TYPE: ICD-10-CM

## 2023-08-13 DIAGNOSIS — M62.831 MUSCLE SPASM OF CALF: ICD-10-CM

## 2023-08-13 RX ORDER — TRAZODONE HYDROCHLORIDE 50 MG/1
50 TABLET ORAL
Qty: 30 TABLET | Refills: 0 | Status: CANCELLED | OUTPATIENT
Start: 2023-08-13

## 2023-08-14 ENCOUNTER — APPOINTMENT (OUTPATIENT)
Dept: LAB | Facility: HOSPITAL | Age: 53
End: 2023-08-14
Payer: COMMERCIAL

## 2023-08-14 ENCOUNTER — ANTICOAG VISIT (OUTPATIENT)
Dept: VASCULAR SURGERY | Facility: CLINIC | Age: 53
End: 2023-08-14

## 2023-08-14 DIAGNOSIS — Z79.01 CURRENT USE OF LONG TERM ANTICOAGULATION: ICD-10-CM

## 2023-08-14 LAB
INR PPP: 2.56 (ref 0.84–1.19)
PROTHROMBIN TIME: 26.9 SECONDS (ref 11.6–14.5)

## 2023-08-14 PROCEDURE — 36415 COLL VENOUS BLD VENIPUNCTURE: CPT

## 2023-08-14 PROCEDURE — 85610 PROTHROMBIN TIME: CPT

## 2023-08-14 RX ORDER — METHOCARBAMOL 500 MG/1
500 TABLET, FILM COATED ORAL 4 TIMES DAILY
Qty: 30 TABLET | Refills: 0 | Status: SHIPPED | OUTPATIENT
Start: 2023-08-14

## 2023-08-14 NOTE — PROGRESS NOTES
Assessment/Plan:    Arterial occlusion  -     VAS lower limb arterial duplex, complete bilateral; Future  -     povidone-iodine (BETADINE) 10 % external solution; Apply 1 Application topically as needed for wound care    -S/P subacute/acute RLE ischemia due to R SFA occlusion treated with Joaquim Carrier and Shagufta stent    -Still has numbness from the knee down, but also in the thigh  -After walking pins and needles to the lower leg  -Advanced from walker and crutches to walking without assistance  -Some incisional serosanguinous drainage  -Washing leg incision with soap and water and wrapping with ACE  -No fevers or chills      -R thigh incision is c/d/i  -R lower leg long incision with some separation and eschar throughout   -Good 2+ DP pulse. There are good AT/DP/PTsignals. Moving toes. A/P Overall stable on POD # 17. Etiology for R LE acute ischemic event is not clear so we will plan on 3-6 months of full anticoagulation. Patient still has residual RLE numbness which will hopefully improve with time. Good 2+ DP pulse. The R leg incision is not yet healed. There is mild separation. Will apply Betadine and short interval follow up. Incisional care:  -Add Betdine to the incision twice daily  -Continue washing with soap and water, dress with ABD, kerlix and apply gentle Tubigrip  -You can use a moisturizer to the foot and leg, but avoid the incisoin    Plan:  -Continue with activity as tolerated  -Maintain good blood pressure, cholesterol and glucose control  -Smoking cessation  -Continue with medical therapy on warfarin, clopidogrel 75 and atorvastatin 40  -Gabapentin for pain/neuropathy  -Baseline doppler in 3 months with surveillance  -Follow up in about 2 weeks for re-check      Additional diagnoses:  Essential hypertension    Hyperlipidemia, mixed    Prediabetes    Tobacco dependence          Subjective:      Patient ID: Lalit Dover is a 46 y.o. male.   Patient is s/p R leg balloon angioplasty, stent and fasciotomy on 7/29/23 Raleigh Kowalski) and presents today for post-op visit. HPI   Kelly Watson 47yo M smoker with hypertension, HLD who was recently hospitalized acute/subacute RLE ischemia having presented with 2 weeks of RLE pain/numbness and found to have R SFA occlusion for which he underwent emergent surgery, now s/p SFA cutdown, antegrade R SFA viabahn stent and fasciotomies 7/29/23 and subsequent fasciotomy washout and closure 8/3/23. Surgery was successful extremity well-perfused, but significant residual numbness and decreased motor at that foot/toes. He was placed on Plavix and oral anticoagulant  -warfarin - as DOAC was cost prohibitive. He was discharged to home with home PT/OT. 8/15/23: Patient presents for first OV on POD# 17 after emergent vascular surgery. Patient continues to complain of residual right lower extremity numbness from the mid thigh to the foot. He feels that the discomfort has not improved. He is walking more. He advanced from walker to crutches and now no longer needs assistance. However, after he walks a distance, the extremity shoots pins-and-needles. He moves the foot and toes. He is washing the leg with soap and water and applying ABD pads and then Ace wrap. Wife states that there is only some serosanguineous drainage on the ABD pads but it seems to be less so. He is compliant with warfarin, clopidogrel and atorvastatin. Warfarin is being managed through the vascular center. We discussed that he will likely require 3 to 6 months of anticoagulation. No fevers or chills. A1c 5.8         Operative Findings:  Chronic thrombus in distal SFA, about 2 cm occlusion. There is calcified plaque in that region, so could be acute on chronic process. After 6x50 Viabhan and 6x40 mm Shagufta, there is complete resolution of the occlusion, remainder of the SFA, Popliteal trifucation are patent with 3 vessel runoff.      Bulging of anterior compartment muscles, healthy and contractile       The following portions of the patient's history were reviewed and updated as appropriate: allergies, current medications, past family history, past medical history, past social history, past surgical history and problem list.    Review of Systems   Constitutional: Negative. HENT: Negative. Eyes: Negative. Respiratory: Negative. Cardiovascular: Negative. Gastrointestinal: Negative. Endocrine: Negative. Genitourinary: Negative. Musculoskeletal: Negative. Skin: Negative. Allergic/Immunologic: Negative. Neurological: Negative. Hematological: Negative. Psychiatric/Behavioral: Negative. Objective:      /80 (BP Location: Left arm, Patient Position: Sitting)   Pulse 78   Ht 5' 9" (1.753 m)   Wt 71.2 kg (157 lb)   BMI 23.18 kg/m²      Physical Exam  Constitutional:       General: He is not in acute distress. Appearance: He is normal weight. HENT:      Head: Normocephalic and atraumatic. Eyes:      General: No scleral icterus. Cardiovascular:      Rate and Rhythm: Regular rhythm. Pulses:           Dorsalis pedis pulses are 2+ on the right side. Pulmonary:      Effort: Pulmonary effort is normal.   Abdominal:      General: Abdomen is flat. Palpations: Abdomen is soft. Musculoskeletal:      Right lower leg: Edema present. Skin:     General: Skin is warm and dry. I have reviewed and made appropriate changes to the review of systems input by the medical assistant.     Vitals:    08/15/23 1055   BP: 144/80   BP Location: Left arm   Patient Position: Sitting   Pulse: 78   Weight: 71.2 kg (157 lb)   Height: 5' 9" (1.753 m)       Patient Active Problem List   Diagnosis   • Abnormal chest CT   • Tobacco dependence   • Essential hypertension   • Annual physical exam   • Prediabetes   • Hyperlipidemia, mixed   • Pain in right leg   • Arterial occlusion   • Acute blood loss anemia   • Insomnia   • Pancreatic lesion Past Surgical History:   Procedure Laterality Date   • IR LOWER EXTREMITY ANGIOGRAM  2023   • THROMBECTOMY W/ EMBOLECTOMY Right 2023    Procedure: RIGHT LEG BALLOON ANGIOPLASTY, STENT, FASCIOTOMY;  Surgeon: Jey Stone MD;  Location: [facility];  Service: Vascular   • WOUND DEBRIDEMENT Right 8/3/2023    Procedure: DEBRIDEMENT LOWER EXTREMITY ([address] OUT) R LEG FASCIOTOMY WOUND WASHOUT, DEBRIDEMENT, AND POSSIBLE CLOSURE VS VAC PLACEMENT;  Surgeon: Vincenzo Tomas DO;  Location: [facility];  Service: Vascular       History reviewed. No pertinent family history. Social History     Socioeconomic History   • Marital status: /Civil Union     Spouse name: Not on file   • Number of children: Not on file   • Years of education: Not on file   • Highest education level: Not on file   Occupational History   • Not on file   Tobacco Use   • Smoking status: Former     Packs/day: 0.50     Types: Cigarettes     Start date: 80     Quit date: 2023     Years since quittin.0     Passive exposure: Never   • Smokeless tobacco: Never   Substance and Sexual Activity   • Alcohol use: Yes     Alcohol/week: 14.0 standard drinks of alcohol     Types: 14 Cans of beer per week   • Drug use: No   • Sexual activity: Not on file   Other Topics Concern   • Not on file   Social History Narrative   • Not on file     Social Determinants of Health     Financial Resource Strain: Not on file   Food Insecurity: No Food Insecurity (2023)    Hunger Vital Sign    • Worried About Running Out of Food in the Last Year: Never true    • Ran Out of Food in the Last Year: Never true   Transportation Needs: No Transportation Needs (2023)    PRAPARE - Transportation    • Lack of Transportation (Medical): No    • Lack of Transportation (Non-Medical):  No   Physical Activity: Not on file   Stress: Not on file   Social Connections: Not on file   Intimate Partner Violence: Not on file   Housing Stability: Low Risk (7/31/2023)    Housing Stability Vital Sign    • Unable to Pay for Housing in the Last Year: No    • Number of Places Lived in the Last Year: 1    • Unstable Housing in the Last Year: No       No Known Allergies      Current Outpatient Medications:   •  acetaminophen (TYLENOL) 325 mg tablet, Take 2 tablets (650 mg total) by mouth every 6 (six) hours as needed for mild pain, Disp: , Rfl:   •  amLODIPine (NORVASC) 5 mg tablet, Take 1 tablet (5 mg total) by mouth daily for 90 days, Disp: 90 tablet, Rfl: 3  •  atorvastatin (LIPITOR) 40 mg tablet, Take 1 tablet (40 mg total) by mouth daily after dinner, Disp: 30 tablet, Rfl: 0  •  clopidogrel (PLAVIX) 75 mg tablet, Take 1 tablet (75 mg total) by mouth daily, Disp: 30 tablet, Rfl: 0  •  methocarbamol (ROBAXIN) 500 mg tablet, Take 1 tablet (500 mg total) by mouth 4 (four) times a day, Disp: 30 tablet, Rfl: 0  •  traZODone (DESYREL) 50 mg tablet, Take 1 tablet (50 mg total) by mouth daily at bedtime, Disp: 30 tablet, Rfl: 0  •  warfarin (Coumadin) 5 mg tablet, Take 5mg daily,or as directed based on INR results, Disp: 90 tablet, Rfl: 0  •  enoxaparin (LOVENOX) 80 mg/0.8 mL, Inject 0.7 mL (70 mg total) under the skin every 12 (twelve) hours, Disp: 10 mL, Rfl: 0  •  gabapentin (NEURONTIN) 300 mg capsule, Take 1 capsule (300 mg total) by mouth 3 (three) times a day, Disp: 90 capsule, Rfl: 0  •  nicotine (NICODERM CQ) 14 mg/24hr TD 24 hr patch, Place 1 patch on the skin over 24 hours daily (Patient not taking: Reported on 8/15/2023), Disp: 28 patch, Rfl: 0  •  oxyCODONE (ROXICODONE) 5 immediate release tablet, Take 1 tablet (5 mg total) by mouth every 4 (four) hours as needed (MODERATE PAIN) for up to 10 days Max Daily Amount: 30 mg (Patient not taking: Reported on 8/15/2023), Disp: 15 tablet, Rfl: 0  •  senna (SENOKOT) 8.6 mg, Take 1 tablet (8.6 mg total) by mouth daily at bedtime Stool softener for constipation.  Hold loose stools, Disp: , Rfl:

## 2023-08-15 ENCOUNTER — OFFICE VISIT (OUTPATIENT)
Dept: VASCULAR SURGERY | Facility: CLINIC | Age: 53
End: 2023-08-15

## 2023-08-15 VITALS
WEIGHT: 157 LBS | BODY MASS INDEX: 23.25 KG/M2 | HEIGHT: 69 IN | HEART RATE: 78 BPM | SYSTOLIC BLOOD PRESSURE: 144 MMHG | DIASTOLIC BLOOD PRESSURE: 80 MMHG

## 2023-08-15 DIAGNOSIS — I10 ESSENTIAL HYPERTENSION: ICD-10-CM

## 2023-08-15 DIAGNOSIS — F17.200 TOBACCO DEPENDENCE: ICD-10-CM

## 2023-08-15 DIAGNOSIS — E78.2 HYPERLIPIDEMIA, MIXED: ICD-10-CM

## 2023-08-15 DIAGNOSIS — I70.90 ARTERIAL OCCLUSION: Primary | ICD-10-CM

## 2023-08-15 DIAGNOSIS — R73.03 PREDIABETES: ICD-10-CM

## 2023-08-15 PROCEDURE — 99024 POSTOP FOLLOW-UP VISIT: CPT | Performed by: PHYSICIAN ASSISTANT

## 2023-08-15 RX ORDER — ACETAMINOPHEN 650 MG
1 TABLET, EXTENDED RELEASE ORAL AS NEEDED
Qty: 118 ML | Refills: 1 | Status: SHIPPED | OUTPATIENT
Start: 2023-08-15

## 2023-08-15 NOTE — PATIENT INSTRUCTIONS
POD # 17     -Add Betdine to the incision twice daily  -Continue washing with soap and water and apply gentle Tubigrip as you have been doing  -You can use a moisturizer to the foot and leg, but avoid the incisoin  -Continue with activity as tolerated  -Maintain good blood pressure, cholesterol and glucose control  -Smoking cessation  -Continue with medical therapy on warfarin, clopidogrel 75 and atorvastatin 40  -Gabapentin for pain/neuropathy  -Baseline doppler in 3 months with surveillance  -Follow up in about 2 weeks for re-check

## 2023-08-16 ENCOUNTER — TELEPHONE (OUTPATIENT)
Dept: VASCULAR SURGERY | Facility: CLINIC | Age: 53
End: 2023-08-16

## 2023-08-16 PROBLEM — E27.8 ADRENAL NODULE (HCC): Status: ACTIVE | Noted: 2023-08-16

## 2023-08-16 PROBLEM — E27.9 ADRENAL NODULE (HCC): Status: ACTIVE | Noted: 2023-08-16

## 2023-08-16 NOTE — TELEPHONE ENCOUNTER
Received disability papers from [address] [place] [address] [address] for [address]. Papers completed by provider and signed. Called pt's wife. She requested that the originals be mailed back to [facility]. This was done. Copies mailed to pt's home. Placed in bin to be scanned into pt's chart.

## 2023-08-17 ENCOUNTER — ANTICOAG VISIT (OUTPATIENT)
Dept: VASCULAR SURGERY | Facility: CLINIC | Age: 53
End: 2023-08-17

## 2023-08-17 ENCOUNTER — CONSULT (OUTPATIENT)
Dept: SURGICAL ONCOLOGY | Facility: CLINIC | Age: 53
End: 2023-08-17
Payer: COMMERCIAL

## 2023-08-17 ENCOUNTER — APPOINTMENT (OUTPATIENT)
Dept: LAB | Facility: HOSPITAL | Age: 53
End: 2023-08-17
Payer: COMMERCIAL

## 2023-08-17 VITALS
TEMPERATURE: 98.2 F | SYSTOLIC BLOOD PRESSURE: 102 MMHG | OXYGEN SATURATION: 94 % | HEART RATE: 69 BPM | HEIGHT: 69 IN | BODY MASS INDEX: 23.52 KG/M2 | WEIGHT: 158.8 LBS | DIASTOLIC BLOOD PRESSURE: 80 MMHG | RESPIRATION RATE: 14 BRPM

## 2023-08-17 DIAGNOSIS — Z79.01 CURRENT USE OF LONG TERM ANTICOAGULATION: ICD-10-CM

## 2023-08-17 DIAGNOSIS — E27.8 ADRENAL NODULE (HCC): ICD-10-CM

## 2023-08-17 DIAGNOSIS — K86.9 PANCREATIC LESION: Primary | ICD-10-CM

## 2023-08-17 LAB
INR PPP: 3.54 (ref 0.84–1.19)
PROTHROMBIN TIME: 34.7 SECONDS (ref 11.6–14.5)

## 2023-08-17 PROCEDURE — 36415 COLL VENOUS BLD VENIPUNCTURE: CPT

## 2023-08-17 PROCEDURE — 85610 PROTHROMBIN TIME: CPT

## 2023-08-17 PROCEDURE — 99204 OFFICE O/P NEW MOD 45 MIN: CPT | Performed by: SURGERY

## 2023-08-17 RX ORDER — DEXAMETHASONE 1 MG
1 TABLET ORAL ONCE
Qty: 1 TABLET | Refills: 0 | Status: SHIPPED | OUTPATIENT
Start: 2023-08-17 | End: 2023-08-17

## 2023-08-17 NOTE — PROGRESS NOTES
Surgical Oncology Consult       [facility] [address] [facility] [address]  [facility]  JEFF [address]  [address]  1970  [phone]  [facility] ONC [facility] [place]  [facility] [address]  [address]  [phone]    Diagnoses and all orders for this visit:    Pancreatic lesion    Adrenal nodule ([address])  -     Aldosterone; Future  -     Catecholamines, fractionated, plasma; Future  -     Catecholamines, fractionated, urine, 24 hour; Future  -     Cortisol Level,7-9 AM Specimen; Future  -     Metanephrine, Fractionated Plasma Free; Future  -     Renin Direct Assay; Future  -     dexamethasone (DECADRON) 1 mg tablet; Take 1 tablet (1 mg total) by mouth once for 1 dose Take at midnight. Lab draw at 8 am.        Chief Complaint   Patient presents with   • Follow-up       Return in about 4 weeks (around 9/14/2023) for Office Visit, Labs - See Treatment Plan. Oncology History    No history exists. History of Present Illness: 41-year-old male who recently had balloon angioplasty/stent of his right lower extremity with a right leg fasciotomy. CTA from July 29, 2023 revealed a 7 mm arterially enhancing lesion in the tail of the pancreas. There was a cystic lesion in the right kidney. There was a 1.5 cm right adrenal nodule. I personally reviewed the films. He is recovering from surgery and comes in to discuss these findings. He does have hypertension that is easily controlled. No family history of malignancy that he knows of. Review of Systems  Complete ROS Surg Onc:   Constitutional: The patient denies new or recent history of general fatigue, no recent weight loss, no change in appetite. Eyes: No complaints of visual problems, no scleral icterus.    ENT: no complaints of ear pain, no hoarseness, no difficulty swallowing,  no tinnitus and no new masses in head, oral cavity, or neck. Cardiovascular: No complaints of chest pain, no palpitations, no ankle edema. Respiratory: No complaints of shortness of breath, no cough. Gastrointestinal: No complaints of jaundice, no bloody stools, no pale stools. Genitourinary: No complaints of dysuria, no hematuria, no nocturia, no frequent urination, no urethral discharge. Musculoskeletal: No complaints of weakness, paralysis, joint stiffness or arthralgias. Integumentary: No complaints of rash, no new lesions. Neurological: No complaints of convulsions, no seizures, no dizziness. Hematologic/Lymphatic: No complaints of easy bruising. Endocrine:  No hot or cold intolerance. No polydipsia, polyphagia, or polyuria. Allergy/immunology:  No environmental allergies. No food allergies. Not immunocompromised. Skin:  No pallor or rash. No wound. Patient Active Problem List   Diagnosis   • Abnormal chest CT   • Tobacco dependence   • Essential hypertension   • Annual physical exam   • Prediabetes   • Hyperlipidemia, mixed   • Pain in right leg   • Arterial occlusion   • Acute blood loss anemia   • Insomnia   • Pancreatic lesion   • Adrenal nodule [facility])     Past Medical History:   Diagnosis Date   • Hypertension    • Lung nodule    • Prediabetes      Past Surgical History:   Procedure Laterality Date   • IR LOWER EXTREMITY ANGIOGRAM  7/29/2023   • THROMBECTOMY W/ EMBOLECTOMY Right 7/29/2023    Procedure: RIGHT LEG BALLOON ANGIOPLASTY, STENT, FASCIOTOMY;  Surgeon: Junaid Brody MD;  Location: [facility];  Service: Vascular   • WOUND DEBRIDEMENT Right 8/3/2023    Procedure: DEBRIDEMENT LOWER EXTREMITY ([address] [place] [address] Street OUT) R LEG FASCIOTOMY WOUND WASHOUT, DEBRIDEMENT, AND POSSIBLE CLOSURE VS VAC PLACEMENT;  Surgeon: Jose Martin Mclaughlin DO;  Location: [facility];  Service: Vascular     History reviewed. No pertinent family history.   Social History     Socioeconomic History   • Marital status: /Civil Union     Spouse name: Not on file   • Number of children: Not on file   • Years of education: Not on file   • Highest education level: Not on file   Occupational History   • Not on file   Tobacco Use   • Smoking status: Former     Packs/day: 0.50     Types: Cigarettes     Start date: 80     Quit date: 2023     Years since quittin.0     Passive exposure: Never   • Smokeless tobacco: Never   Vaping Use   • Vaping Use: Never used   Substance and Sexual Activity   • Alcohol use: Yes     Alcohol/week: 14.0 standard drinks of alcohol     Types: 14 Cans of beer per week   • Drug use: No   • Sexual activity: Not on file   Other Topics Concern   • Not on file   Social History Narrative   • Not on file     Social Determinants of Health     Financial Resource Strain: Not on file   Food Insecurity: No Food Insecurity (2023)    Hunger Vital Sign    • Worried About Running Out of Food in the Last Year: Never true    • Ran Out of Food in the Last Year: Never true   Transportation Needs: No Transportation Needs (2023)    PRAPARE - Transportation    • Lack of Transportation (Medical): No    • Lack of Transportation (Non-Medical):  No   Physical Activity: Not on file   Stress: Not on file   Social Connections: Not on file   Intimate Partner Violence: Not on file   Housing Stability: Low Risk  (2023)    Housing Stability Vital Sign    • Unable to Pay for Housing in the Last Year: No    • Number of Places Lived in the Last Year: 1    • Unstable Housing in the Last Year: No       Current Outpatient Medications:   •  acetaminophen (TYLENOL) 325 mg tablet, Take 2 tablets (650 mg total) by mouth every 6 (six) hours as needed for mild pain, Disp: , Rfl:   •  atorvastatin (LIPITOR) 40 mg tablet, Take 1 tablet (40 mg total) by mouth daily after dinner, Disp: 30 tablet, Rfl: 0  •  clopidogrel (PLAVIX) 75 mg tablet, Take 1 tablet (75 mg total) by mouth daily, Disp: 30 tablet, Rfl: 0  • dexamethasone (DECADRON) 1 mg tablet, Take 1 tablet (1 mg total) by mouth once for 1 dose Take at midnight. Lab draw at 8 am., Disp: 1 tablet, Rfl: 0  •  gabapentin (NEURONTIN) 300 mg capsule, Take 1 capsule (300 mg total) by mouth 3 (three) times a day, Disp: 90 capsule, Rfl: 0  •  methocarbamol (ROBAXIN) 500 mg tablet, Take 1 tablet (500 mg total) by mouth 4 (four) times a day, Disp: 30 tablet, Rfl: 0  •  povidone-iodine (BETADINE) 10 % external solution, Apply 1 Application topically as needed for wound care, Disp: 118 mL, Rfl: 1  •  traZODone (DESYREL) 50 mg tablet, Take 1 tablet (50 mg total) by mouth daily at bedtime, Disp: 30 tablet, Rfl: 0  •  warfarin (Coumadin) 5 mg tablet, Take 5mg daily,or as directed based on INR results, Disp: 90 tablet, Rfl: 0  •  amLODIPine (NORVASC) 5 mg tablet, Take 1 tablet (5 mg total) by mouth daily for 90 days, Disp: 90 tablet, Rfl: 3  •  enoxaparin (LOVENOX) 80 mg/0.8 mL, Inject 0.7 mL (70 mg total) under the skin every 12 (twelve) hours (Patient not taking: Reported on 8/17/2023), Disp: 10 mL, Rfl: 0  •  nicotine (NICODERM CQ) 14 mg/24hr TD 24 hr patch, Place 1 patch on the skin over 24 hours daily (Patient not taking: Reported on 8/15/2023), Disp: 28 patch, Rfl: 0  •  senna (SENOKOT) 8.6 mg, Take 1 tablet (8.6 mg total) by mouth daily at bedtime Stool softener for constipation. Hold loose stools (Patient not taking: Reported on 8/17/2023), Disp: , Rfl:   No Known Allergies  Vitals:    08/17/23 1258   BP: 102/80   Pulse: 69   Resp: 14   Temp: 98.2 °F (36.8 °C)   SpO2: 94%       Physical Exam   Constitutional: General appearance: The Patient is well-developed and well-nourished who appears the stated age in no acute distress. Patient is pleasant and talkative. HEENT:  Normocephalic. Sclerae are anicteric. Mucous membranes are moist. Neck is supple without adenopathy. No JVD. Chest: The lungs are clear to auscultation. Cardiac: Heart is regular rate.      Abdomen: Abdomen is soft, non-tender, non-distended and without masses. Extremities: There is no clubbing or cyanosis. There is no edema. Symmetric. Neuro: Grossly nonfocal. Gait is normal.     Lymphatic: No evidence of cervical adenopathy bilaterally. No evidence of axillary adenopathy bilaterally. Skin: Warm, anicteric. Psych:  Patient is pleasant and talkative. Breasts:      Pathology:  [unfilled]    Labs:      Imaging  CTA chest abdomen pelvis w wo contrast    Result Date: 7/31/2023  Narrative: CTA - CHEST, ABDOMEN AND PELVIS - WITHOUT AND WITH IV CONTRAST INDICATION:   Evaluate for aortic thrombus formation. COMPARISON: CT angiogram July 29, 2023 TECHNIQUE: CT examination of the chest, abdomen and pelvis was performed both prior to and after the administration of intravenous contrast.  The noncontrast portion of this examination was performed utilizing low radiation dose technique. Thin section  angiographic arterial phase post contrast technique was used in order to evaluate for aortic dissection. 3D reformatted images and volume rendering were performed on an independent workstation. Additionally, axial, sagittal, and coronal 2D reformatted  images were created from the source data and submitted for interpretation. Radiation dose length product (DLP) for this visit:  1693.92 mGy-cm . This examination, like all CT scans performed in the [facility], was performed utilizing techniques to minimize radiation dose exposure, including the use of iterative reconstruction and automated exposure control. IV Contrast:  100 mL of iohexol (OMNIPAQUE) Enteric Contrast:  Enteric contrast was not administered. FINDINGS: AORTA:  There is no aortic dissection or intramural hematoma. There is no aortic aneurysm. Atherosclerotic changes of the descending aorta and at the bifurcation, as well as bilateral external and internal iliac arteries. No significant stenosis. CHEST LUNGS: No consolidation.  No edema. Minimal subpleural reticulation. Numerous groundglass opacities throughout both upper lobes. No suspicious solid nodules. PLEURA: No pleural effusion. No pneumothorax. HEART/PULMONARY ARTERIAL TREE: Calcification of the aortic annulus. MEDIASTINUM AND MELODIE:  Unremarkable. CHEST WALL AND LOWER NECK:   Unremarkable. ABDOMEN LIVER/BILIARY TREE: Normal size and morphology. Several tiny foci of hyperenhancement are noted, including: - Segment 4a measuring 0.5 cm (series 3/120, 601/40). - Segment 5 measuring 0..6 cm (series 3/121, 601/96). - Segment 6 measuring 0.3 cm (series 3/131, 601/96). GALLBLADDER:  No calcified gallstones. No pericholecystic inflammatory change. SPLEEN:  Unremarkable. PANCREAS: Normal pancreatic parenchymal bulk and morphology. No main pancreatic ductal dilatation. No inflammation. Again seen is a 7 mm hyperenhancing focus in the pancreatic tail (series 3/114, 601/91). ADRENAL GLANDS: Normal left adrenal gland. There is a 1.5 x 1.1 cm low-attenuation (5 HU) right adrenal nodule, unchanged. KIDNEYS/URETERS: Redemonstration of 2.4 x 2.0 cm posterior right upper pole cystic lesion with multiple calcifications. No suspicious solid lesions. No calculi. No hydronephrosis. Lolita Star STOMACH AND BOWEL:  Unremarkable. APPENDIX:  No findings to suggest appendicitis. ABDOMINOPELVIC CAVITY:  No ascites or free intraperitoneal air. No lymphadenopathy. PELVIS REPRODUCTIVE ORGANS: The prostate is enlarged measuring 4.2 x 4.7 x 4.1 cm, 53 mm. URINARY BLADDER: Normal wall thickness. Small amount of intraluminal gas, likely related to recent catheterization. ABDOMINAL WALL/INGUINAL REGIONS: Post right femoral access subcutaneous changes without evidence of pseudoaneurysm. OSSEOUS STRUCTURES:  No acute fracture or destructive osseous lesion. Impression: 1. No acute aortic pathology. 2.  Numerous groundglass opacities throughout the upper lobes concerning for atypical infectious process.  3.  Redemonstration of a hyperenhancing 7 mm lesion in the pancreatic tail suggestive of a pancreatic neuroendocrine tumor. There are multiple subcentimeter foci of hyperenhancement also seen in the liver which are compatible with vascular shunting, however metastatic lesions would be a consideration in the context of suspected pancreatic neoplasm. Both findings could be further evaluated with contrast-enhanced MRI abdomen on an outpatient basis. 4.  Right adrenal nodule measuring up to 1.5 cm. Its imaging features are diagnostic of benign adrenal adenoma, and does not need further follow-up or work-up. If there are clinical signs or symptoms of adrenal hyperfunction, biochemical evaluation may be appropriate. Adrenal recommendation based on institutional consensus and Journal of [employer] of Radiology 2017;14:1518-1831. The study was marked in EPIC for significant notification. Resident: Marlise Fabry, the attending radiologist, have reviewed the images and agree with the final report above. Workstation performed: [id number]     IR lower extremity angiogram    Result Date: 2023  Narrative: Table formatting from the original result was not included. Images from the original result were not included.  Lynne Obrien MD Physician Vascular Surgery Op Note   Addendum Date of Service:  2023  3:34 PM Case Time:  Procedures:  Surgeons:  2023  3:34 PM RIGHT LEG BALLOON ANGIOPLASTY, STENT, FASCIOTOMY  MD Danis Richmond MD Cesario Hazard, DO                                                                                                                                                                                                                                                                                                                                                         OPERATIVE REPORT PATIENT NAME: Sharon Bolton  :  1970 MRN: [id number] Pt Location: [facility] HYBRID OR ROOM 02   SURGERY DATE: 7/29/2023   Surgeon(s) and Role:    * Yamel Vidal MD - Primary    * James Nettles MD - Assisting    * Siobhan Saldivar DO - Assisting   Preop Diagnosis: Pain in right leg [M79.604] Arterial occlusion [I70.90]   Post-Op Diagnosis Codes:    * Pain in right leg [M79.604]    * Arterial occlusion [I70.90]   Procedure(s): Right - RIGHT SFA CUTDOWN, RIGHT LEG ANGIOGRAM, RIGHT LEG BALLOON ANGIOPLASTY / STENT. RIGHT LEG FASCIOTOMY   Specimen(s): * No specimens in log *   Estimated Blood Loss: Minimal   Drains: Urethral Catheter Latex 16 Fr.  (Active) Number of days: 0     Anesthesia Type: General   Operative Indications: Pain in right leg [M79.604] Arterial occlusion [] 59-year-old male with past medical history of hypertension, hyperlipidemia and smoking who presents with 11-day history of right leg pain and difficulty bearing weight.  Patient started having sudden onset of right leg pain and inability to walk about 11 days ago. [facility] first went to [facility] where he was evaluated with a Doppler and everything was negative so he was sent home after Toradol shot. [facility] continued to have difficulty with walking and started using crutches.  He then went to see his family physician who ordered a Doppler which showed occlusion of the artery and was sent over to the emergency room.  Patient complains of swelling in his right outer calf and some mild numbness in the foot.  It is also very painful to move his foot.  On exam the right anterior and lateral compartment of the leg are significantly tender and swollen, per patient this has been ongoing for the last few days, could be element of compartment syndrome versus ischemia reperfusion related edema of the anterior compartment muscles.  He has been on a heparin drip since admission.  We will obtain CT angiogram which shows segmental occlusion of right SFA.  Patient will require right anterolateral fasciotomy and revascularization with possible thrombectomy of the SFA.  On exam today there is biphasic anterior tibial and posterior tibial signals.  Right foot cap refill less than 3 seconds.   Operative Findings: Chronic thrombus in distal SFA, about 2 cm occlusion. There is calcified plaque in that region, so could be acute on chronic process. After 6x50 Viabhan and 6x40 mm Shagufta, there is complete resolution of the occlusion, remainder of the SFA, Popliteal trifucation are patent with 3 vessel runoff.   Bulging of anterior compartment muscles, healthy and contractile   Complications: None   Procedure and Technique: Pt was brought to Hybrid room with continuous heparin drip, general anesthesia was induced, jimenes catheter placed. Entire right leg was circumferentially prepped. Full time out was performed. Right SFA cutdown in upper thigh was made. After retracting the sartorius muscle we exposed the SFA and encircled it proximal and distally with vessel loops.   We punctured the SFA antegrade with micropuncture and microwire and placed micro sheath, then over bentson wire we placed a 6Fr sheath in to SFA. Left leg runoff was performed which showed focal occlusion of distal SFA with subacute thrombotic lesion in prior old plaque. There is a 3 vessel runoff.   So using V18 wire the lesion was crossed in true lumen. The vessel loop was used to occlude the SFA to prevent any antegrade flow so that we do not risk embolization during lesion treatment. A 6x50mm viabahn was placed across lesion and deployed under roadmap. Then it was post dilated with a 5x40mm balloon. There was some residual thrombus at the distal edge of stent. Hence a 6x40mm Shagufta stent was placed across this distal edge of stent with adequate overlap in to the Viabahn. This stent was not dilated with balloon to avoid risk of embolization of any thrombotic material.  Completion angiogram was satisfactory with complete treatment of lesion and 3-vessel runoff.   The sheath and wire were removed and arteriotomy was closed with a 6-0 prolene U-stitch after flushing the artery.   We started with a fasciotomy of the anterior and lateral compartments. A generous incision was made between the tibia and the fibula using 15 blade. Then the fascia overlying the anterior compartment was divided along the leg sharply. There was diffuse bulging of the muscles of the anterior compartment. There were contractile and pink. They were not grossly necrotic. We then raised subcutaneous flaps and expose the lateral compartment. An incision was made over the lateral compartment using Bovie electrocautery and these muscles were also bulging. And then the fasciotomy incision was extended proximally and distally to completely release the lateral compartment as well. Care was taken to avoid the area of the peroneal nerve proximally. 2-0 Nylon sutures vertical mattress skin sutures were taken but left untied along the fasciotomy incision.      I was present for the entire procedure.   Patient Disposition: PACU  and patient will return to OR for planned surgery as a staged procedure for fasciotomy closure at a later date.       SIGNATURE: Susan Bowen MD DATE: July 29, 2023 TIME: 5:05 PM           Vascular Quality Initiative - Peripheral Vascular Intervention   Urgency: Emergent   Functional Status:  Fully active; able to carry on all predisease activities without restriction. Ambulation: Amb = independently ambulatory   Leg Symptoms    RIGHT: Acute Ischemia:  Acute limb ischemia is defined as a sudden decrease in limb perfusion that causes a potential threat to limb viability (manifested by ischemic rest pain, ischemic ulcers, and/or gangrene) in patients who present within two weeks of the acute event  Immediately Threatened limbs: are salvageable with immediate revascularization. Sensory loss involves more than the toes and may be associated with rest pain.  There is mild to moderate muscle weakness, arterial Doppler signals are usually inaudible, and venous Doppler signals are audible   LEFT : None   COVID Information COVID Symptoms Pre-Procedure: Asymptomatic  Treatment Delayed by Pandemic: None   Access Number of Sites: 1   Access Site 1:   Side 1: Right   Site 1: SFA   Access Guidance 1:Open Exposure   Largest Sheath Size 1: 6 Fr. Closure Device 1: None   None   Procedure Fluoro Time: 11.4 minutes Contrast Volume: Visipaque 90 ml DAP: 8.46 Gy.cm2 CO2: no Anticoagulant: Heparin  Protamine: No If Creatinine is > 1.2 or missing, BOB Prophylaxis none  Treatment Details Indication: Occlusive Disease,  Completion Assessment Artery 1 treated: SFA        Right             Outflow: AT,PT,Peroneal: 3                Was this Site previously treated?: No         TASC Grade: B         Total Treated Length: 6 cm         Total Occluded Length: 3 cm         Calcification: Moderate (calcification on both sides of artery < half length of lesion)         Number of Treatment types (Devices): 2         Device 1         Treatment Type: Stent Graft              Diameter: 6 mm         Length: 50 mm        Device 2         Treatment Type: Stent,  Drug Eluting Stent              Diameter: 6 mm         Length: 40 mm     Device 3         Treatment Type: Plain balloon           Concomitant: None         Technical result: Successful (stenosis <=30%)    None   Post Procedure Patient currently taking:          Statin, Yes                                                 Antiplatelet Medication, Yes   Procedure Complications: No        Electronically signed by Austin Perkins MD at 7/29/2023  7:59 PM     CTA abdominal w run off w wo contrast    Result Date: 7/31/2023  Narrative: CT ANGIOGRAM OF THE AORTA AND LOWER EXTREMITIES WITH IV CONTRAST INDICATION: Evaluate right lower extremity for occlusive disease. Sudden onset right lower extremity pain with inability to bear weight. COMPARISON: None.  TECHNIQUE:  CT angiogram examination of the abdomen, pelvis, and lower extremities was performed according to standard protocol with intravenous contrast.  This examination, like all CT scans performed in the [facility], was performed utilizing techniques to minimize radiation dose exposure, including the use of iterative reconstruction and automated exposure control. 3D reconstructions were performed an independent workstation, and are supplied for review. Rad dose 2155.32 mGy-cm IV Contrast:  100 mL of iohexol (OMNIPAQUE) FINDINGS: VASCULAR STRUCTURES: The visualized thoracoabdominal aorta is patent and of normal caliber, with moderate infrarenal mixed atherosclerotic disease, no significant stenosis. The mesenteric and renal arteries are patent. RIGHT: Common iliac artery: Patent without significant stenosis. External iliac artery: Patent. Internal iliac artery and branches: Patent. Common femoral artery: Patent. Profundofemoral artery and branches: Patent. Superficial femoral artery: Short segment focal occlusion of the mid to distal SFA, in a region of dense calcific plaque, axial series 2 image 246. There is short interval reconstitution. The remainder of the SFA is patent without focal abnormality. Popliteal artery: Patent. Patent three-vessel runoff. LEFT: Common iliac artery: Patent without significant stenosis. External iliac artery: Patent. Internal iliac artery and branches: Patent. Common femoral artery: Patent. Profundofemoral artery and branches: Patent. Superficial femoral artery: Patent. Popliteal artery: Patent. Patent three-vessel runoff. OTHER FINDINGS ABDOMEN LOWER CHEST:  No significant abnormality in the lung bases. LIVER/BILIARY TREE:  Unremarkable. GALLBLADDER:  No calcified gallstones. No pericholecystic inflammatory change. SPLEEN:  Unremarkable. Normal size. PANCREAS: Rounded arterially enhancing pancreatic tail lesion measuring 7 mm, axial series 2 image 30.  Not completely evaluated on this single phase arterial exam. ADRENAL GLANDS: 1.5 x 1.0 cm nodule involving the medial limb of the right adrenal gland, axial series 2 image 20. Incompletely evaluated on this single phase arterial exam. The left adrenal gland is unremarkable. KIDNEYS/URETERS:  No solid renal mass. No hydronephrosis. No urinary tract calculi. 2.7 x 2.3 x 2.4 cm posterior right upper pole cystic lesion demonstrating several coarse mural calcifications along the posterior inferior wall. Not completely evaluated on this single phase arterial exam. Subcentimeter rounded hypodensities in the bilateral kidneys are too small to characterize on CT, most likely represent cysts. PELVIS REPRODUCTIVE ORGANS:  Unremarkable for patient's age. URINARY BLADDER:  Unremarkable. ADDITIONAL ABDOMINAL AND PELVIC STRUCTURES STOMACH AND BOWEL: Unremarkable. ABDOMINOPELVIC CAVITY:   No pathologically enlarged mesenteric or retroperitoneal lymph nodes. No ascites or free intraperitoneal air. ABDOMINAL WALL/INGUINAL REGIONS:  Unremarkable. OSSEOUS STRUCTURES:  No acute fracture or destructive osseous lesion. Impression: 1. Focal mid to distal right SFA occlusion with short interval reconstitution. Patent three-vessel runoff. Vascular surgery is aware of these findings at the time of dictation. 2. 7 mm arterially enhancing lesion within the tail of the pancreas. 3. Cystic lesion within the right kidney measuring up to 2.7 cm, demonstrating mural calcifications. 4. Right adrenal nodule measuring up to 1.5 cm. The above incidental findings are incompletely evaluated on this single phase arterial exam. Recommend further work-up with a multiphase CT versus abdominal MRI / MRCP, with and without contrast. Workstation performed: [id number]     Echo complete w/ contrast if indicated    Result Date: 7/30/2023  Narrative: •  Left Ventricle: Left ventricular cavity size is normal. Wall thickness is normal. The left ventricular ejection fraction is 65%.  Systolic function is normal. Wall motion is normal. Diastolic function is normal. •  Atrial Septum: No patent foramen ovale detected using color flow Doppler at rest. No diagnostic evidence of patent foramen ovale or intracardiac source of embolism. Technically limited evaluation for the same. If high clinical suspicion for intracardiac source of embolism, then transesophageal echocardiogram may provide additional information. VAS lower limb arterial duplex, limited, unilateral    Result Date: 7/28/2023  Narrative:  [facility] REPORT CLINICAL: Indications:  Patient complains of rest pain and pain with walking x 2 weeks. Patient had stepped outside as his job and as he was walking back in he experienced numbness, and then pain started. Operative History: No cardiovascular surgeries Risk Factors The patient has history of HTN, Hyperlipidemia and PAD. Clinical Right Pressure:  191/ mm Hg, Left Pressure:  199/ mm Hg. FINDINGS:  Right                  Impression  PSV (cm/s)  EDV  Common Femoral Artery                     190       Prox Profunda                             178       Prox SFA                                   86       Mid SFA                Occluded             0       Dist SFA               Occluded             0       Proximal Pop                               25    7  Distal Pop                                 41   14  Dist Post Tibial                           19    9  Dist. Ant. Tibial      Occluded             0    0  Dist Peroneal                              12    6     CONCLUSION: Impression: RIGHT LOWER LIMB: Findings suggestive of an acute occlusion of the mid and distal superficial femoral artery. There is an occlusion of the distal anterior tibial artery. Ankle/Brachial index: 0.17, which is in the rest pain/tissure loss category. Metatarsal pressure of 54mmHg Great toe pressure of 37mmHg, within the healing range. PVR/ PPG tracings are attenuated.   LEFT LOWER LIMB: Ankle/Brachial index: 1.07, which is in the normal category.: Metatarsal pressure of 188mmHg Great toe pressure of 160mmHg, within the healing range. PVR/ PPG tracings are normal.  Technical findings were given to Dr. Irene López. Patient is being sent to the ER. SIGNATURE: Electronically Signed by: Ham Abdi MD, RPVI on 2023-07-28 08:11:18 PM    XR tibia fibula 2 vw right    Result Date: 7/24/2023  Narrative: RIGHT TIBIA AND FIBULA INDICATION:   M79.604: Pain in right leg. COMPARISON:  None VIEWS:  XR TIBIA FIBULA 2 VW RIGHT Images: 4 FINDINGS: There is no acute fracture or dislocation. No significant degenerative changes. No lytic or blastic osseous lesion. Soft tissues are unremarkable. Impression: No acute osseous abnormality. Workstation performed: [id number]     I personally reviewed and interpreted the above laboratory and imaging data. Discussion/Summary: 59-year-old male with a 1.5 cm right adrenal nodule and an 8 mm pancreatic lesion. We had a long discussion regarding these lesions. The pancreatic lesion is likely a neuroendocrine tumor. I have recommended awaiting the results of his MRI. If this confirms that this is a neuroendocrine tumor we can observe this since it is less than 2 cm in size. If the MRI does not confirm this, he may require endoscopic ultrasound. Regarding the adrenal nodule, this does not appear to be functioning. We will send blood and urine studies as part of his functional work-up as well as a low-dose dexamethasone suppression test.  I discussed that if the lesion is functioning, it would require surgical intervention. If it is not functioning we will continue to observe this with yearly imaging. I discussed the risk of malignancy increases when the lesion becomes 5 to 6 cm in size. I will see him back once we have the results of his MRI, and functional adrenal work-up. He is agreeable to this plan. All his questions were answered.

## 2023-08-21 ENCOUNTER — TELEPHONE (OUTPATIENT)
Dept: VASCULAR SURGERY | Facility: CLINIC | Age: 53
End: 2023-08-21

## 2023-08-21 NOTE — TELEPHONE ENCOUNTER
Patients wife asked for a letter to have patient out of work until he sees Dr. Eugene Wilcox on 8/29/23.   Faxed to [phone]

## 2023-08-23 ENCOUNTER — HOSPITAL ENCOUNTER (OUTPATIENT)
Dept: MRI IMAGING | Facility: HOSPITAL | Age: 53
Discharge: HOME | End: 2023-08-23
Attending: STUDENT IN AN ORGANIZED HEALTH CARE EDUCATION/TRAINING PROGRAM
Payer: COMMERCIAL

## 2023-08-23 ENCOUNTER — TELEPHONE (OUTPATIENT)
Dept: VASCULAR SURGERY | Facility: CLINIC | Age: 53
End: 2023-08-23

## 2023-08-23 DIAGNOSIS — K86.9 PANCREATIC LESION: ICD-10-CM

## 2023-08-23 PROCEDURE — A9585 GADOBUTROL INJECTION: HCPCS | Performed by: STUDENT IN AN ORGANIZED HEALTH CARE EDUCATION/TRAINING PROGRAM

## 2023-08-23 PROCEDURE — 74183 MRI ABD W/O CNTR FLWD CNTR: CPT

## 2023-08-23 PROCEDURE — G1004 CDSM NDSC: HCPCS

## 2023-08-23 RX ORDER — GADOBUTROL 604.72 MG/ML
7 INJECTION INTRAVENOUS
Status: COMPLETED | OUTPATIENT
Start: 2023-08-23 | End: 2023-08-23

## 2023-08-23 RX ADMIN — GADOBUTROL 7 ML: 604.72 INJECTION INTRAVENOUS at 12:49

## 2023-08-23 NOTE — TELEPHONE ENCOUNTER
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 [facility] and then will be scanned into chart.

## 2023-08-24 ENCOUNTER — ANTICOAG VISIT (OUTPATIENT)
Dept: VASCULAR SURGERY | Facility: CLINIC | Age: 53
End: 2023-08-24

## 2023-08-24 ENCOUNTER — APPOINTMENT (OUTPATIENT)
Dept: LAB | Facility: HOSPITAL | Age: 53
End: 2023-08-24
Attending: SURGERY
Payer: COMMERCIAL

## 2023-08-24 DIAGNOSIS — Z79.01 CURRENT USE OF LONG TERM ANTICOAGULATION: ICD-10-CM

## 2023-08-24 DIAGNOSIS — E27.8 ADRENAL NODULE (HCC): ICD-10-CM

## 2023-08-24 LAB
CORTIS AM PEAK SERPL-MCNC: 0.9 UG/DL (ref 6.7–22.6)
INR PPP: 3.32 (ref 0.84–1.19)
PROTHROMBIN TIME: 34.7 SECONDS (ref 11.6–14.5)

## 2023-08-24 PROCEDURE — 85610 PROTHROMBIN TIME: CPT

## 2023-08-24 PROCEDURE — 82533 TOTAL CORTISOL: CPT

## 2023-08-24 PROCEDURE — 84244 ASSAY OF RENIN: CPT

## 2023-08-24 PROCEDURE — 82088 ASSAY OF ALDOSTERONE: CPT

## 2023-08-24 PROCEDURE — 83835 ASSAY OF METANEPHRINES: CPT

## 2023-08-24 PROCEDURE — 36415 COLL VENOUS BLD VENIPUNCTURE: CPT

## 2023-08-24 PROCEDURE — 82384 ASSAY THREE CATECHOLAMINES: CPT

## 2023-08-24 NOTE — PROGRESS NOTES
Advised that pt take Coumadin 5 mg today and Monday, and 7.5 mg the rest of the days. Recheck INR in one week.

## 2023-08-25 ENCOUNTER — APPOINTMENT (OUTPATIENT)
Dept: LAB | Facility: HOSPITAL | Age: 53
End: 2023-08-25
Attending: SURGERY
Payer: COMMERCIAL

## 2023-08-25 DIAGNOSIS — Z12.11 COLON CANCER SCREENING: ICD-10-CM

## 2023-08-25 LAB — HEMOCCULT STL QL IA: NEGATIVE

## 2023-08-25 PROCEDURE — 82384 ASSAY THREE CATECHOLAMINES: CPT

## 2023-08-25 PROCEDURE — G0328 FECAL BLOOD SCRN IMMUNOASSAY: HCPCS

## 2023-08-26 DIAGNOSIS — M62.831 MUSCLE SPASM OF CALF: ICD-10-CM

## 2023-08-28 ENCOUNTER — CONSULT (OUTPATIENT)
Dept: HEMATOLOGY ONCOLOGY | Facility: CLINIC | Age: 53
End: 2023-08-28
Payer: COMMERCIAL

## 2023-08-28 ENCOUNTER — APPOINTMENT (OUTPATIENT)
Dept: LAB | Facility: HOSPITAL | Age: 53
End: 2023-08-28
Payer: COMMERCIAL

## 2023-08-28 ENCOUNTER — TELEPHONE (OUTPATIENT)
Dept: HEMATOLOGY ONCOLOGY | Facility: CLINIC | Age: 53
End: 2023-08-28

## 2023-08-28 VITALS
HEART RATE: 80 BPM | WEIGHT: 159 LBS | DIASTOLIC BLOOD PRESSURE: 78 MMHG | SYSTOLIC BLOOD PRESSURE: 122 MMHG | BODY MASS INDEX: 23.55 KG/M2 | OXYGEN SATURATION: 98 % | TEMPERATURE: 98.2 F | RESPIRATION RATE: 18 BRPM | HEIGHT: 69 IN

## 2023-08-28 DIAGNOSIS — I70.90 ARTERIAL OCCLUSION: ICD-10-CM

## 2023-08-28 DIAGNOSIS — D72.829 LEUKOCYTOSIS, UNSPECIFIED TYPE: ICD-10-CM

## 2023-08-28 DIAGNOSIS — E27.8 ADRENAL NODULE (HCC): ICD-10-CM

## 2023-08-28 DIAGNOSIS — K86.9 PANCREATIC LESION: Primary | ICD-10-CM

## 2023-08-28 DIAGNOSIS — I70.90 ARTERIAL OCCLUSION: Primary | ICD-10-CM

## 2023-08-28 DIAGNOSIS — K86.9 PANCREATIC LESION: ICD-10-CM

## 2023-08-28 DIAGNOSIS — I10 ESSENTIAL HYPERTENSION: ICD-10-CM

## 2023-08-28 LAB
ALBUMIN SERPL BCP-MCNC: 5 G/DL (ref 3.5–5)
ALDOST SERPL-MCNC: 4.3 NG/DL (ref 0–30)
ALP SERPL-CCNC: 95 U/L (ref 34–104)
ALT SERPL W P-5'-P-CCNC: 32 U/L (ref 7–52)
ANION GAP SERPL CALCULATED.3IONS-SCNC: 7 MMOL/L
AST SERPL W P-5'-P-CCNC: 19 U/L (ref 13–39)
BASOPHILS # BLD AUTO: 0.11 THOUSANDS/ÂΜL (ref 0–0.1)
BASOPHILS NFR BLD AUTO: 1 % (ref 0–1)
BILIRUB SERPL-MCNC: 0.38 MG/DL (ref 0.2–1)
BUN SERPL-MCNC: 10 MG/DL (ref 5–25)
CALCIUM SERPL-MCNC: 10 MG/DL (ref 8.4–10.2)
CHLORIDE SERPL-SCNC: 98 MMOL/L (ref 96–108)
CO2 SERPL-SCNC: 30 MMOL/L (ref 21–32)
CREAT SERPL-MCNC: 0.74 MG/DL (ref 0.6–1.3)
DOPAMINE 24H UR-MRATE: 60 PG/ML (ref 0–48)
EOSINOPHIL # BLD AUTO: 0.43 THOUSAND/ÂΜL (ref 0–0.61)
EOSINOPHIL NFR BLD AUTO: 4 % (ref 0–6)
EPINEPH PLAS-MCNC: 18 PG/ML (ref 0–62)
ERYTHROCYTE [DISTWIDTH] IN BLOOD BY AUTOMATED COUNT: 13.3 % (ref 11.6–15.1)
FERRITIN SERPL-MCNC: 78 NG/ML (ref 24–336)
FOLATE SERPL-MCNC: 6.7 NG/ML
GFR SERPL CREATININE-BSD FRML MDRD: 104 ML/MIN/1.73SQ M
GLUCOSE SERPL-MCNC: 109 MG/DL (ref 65–140)
HCT VFR BLD AUTO: 38.8 % (ref 36.5–49.3)
HGB BLD-MCNC: 12.8 G/DL (ref 12–17)
IMM GRANULOCYTES # BLD AUTO: 0.04 THOUSAND/UL (ref 0–0.2)
IMM GRANULOCYTES NFR BLD AUTO: 0 % (ref 0–2)
LYMPHOCYTES # BLD AUTO: 3.91 THOUSANDS/ÂΜL (ref 0.6–4.47)
LYMPHOCYTES NFR BLD AUTO: 32 % (ref 14–44)
MCH RBC QN AUTO: 27.3 PG (ref 26.8–34.3)
MCHC RBC AUTO-ENTMCNC: 33 G/DL (ref 31.4–37.4)
MCV RBC AUTO: 83 FL (ref 82–98)
MONOCYTES # BLD AUTO: 0.73 THOUSAND/ÂΜL (ref 0.17–1.22)
MONOCYTES NFR BLD AUTO: 6 % (ref 4–12)
NEUTROPHILS # BLD AUTO: 6.96 THOUSANDS/ÂΜL (ref 1.85–7.62)
NEUTS SEG NFR BLD AUTO: 57 % (ref 43–75)
NOREPINEPH PLAS-MCNC: 765 PG/ML (ref 0–874)
NRBC BLD AUTO-RTO: 0 /100 WBCS
PLATELET # BLD AUTO: 565 THOUSANDS/UL (ref 149–390)
PMV BLD AUTO: 8.5 FL (ref 8.9–12.7)
POTASSIUM SERPL-SCNC: 3.6 MMOL/L (ref 3.5–5.3)
PROT SERPL-MCNC: 9 G/DL (ref 6.4–8.4)
RBC # BLD AUTO: 4.69 MILLION/UL (ref 3.88–5.62)
SODIUM SERPL-SCNC: 135 MMOL/L (ref 135–147)
VIT B12 SERPL-MCNC: 388 PG/ML (ref 180–914)
WBC # BLD AUTO: 12.18 THOUSAND/UL (ref 4.31–10.16)

## 2023-08-28 PROCEDURE — 87205 SMEAR GRAM STAIN: CPT | Performed by: SURGERY

## 2023-08-28 PROCEDURE — 85732 THROMBOPLASTIN TIME PARTIAL: CPT

## 2023-08-28 PROCEDURE — 86316 IMMUNOASSAY TUMOR OTHER: CPT

## 2023-08-28 PROCEDURE — 86146 BETA-2 GLYCOPROTEIN ANTIBODY: CPT

## 2023-08-28 PROCEDURE — 83550 IRON BINDING TEST: CPT

## 2023-08-28 PROCEDURE — 88184 FLOWCYTOMETRY/ TC 1 MARKER: CPT

## 2023-08-28 PROCEDURE — 99205 OFFICE O/P NEW HI 60 MIN: CPT | Performed by: INTERNAL MEDICINE

## 2023-08-28 PROCEDURE — 87070 CULTURE OTHR SPECIMN AEROBIC: CPT | Performed by: SURGERY

## 2023-08-28 PROCEDURE — 88185 FLOWCYTOMETRY/TC ADD-ON: CPT

## 2023-08-28 PROCEDURE — 85025 COMPLETE CBC W/AUTO DIFF WBC: CPT

## 2023-08-28 PROCEDURE — 85613 RUSSELL VIPER VENOM DILUTED: CPT

## 2023-08-28 PROCEDURE — 86147 CARDIOLIPIN ANTIBODY EA IG: CPT

## 2023-08-28 PROCEDURE — 85670 THROMBIN TIME PLASMA: CPT

## 2023-08-28 PROCEDURE — 83090 ASSAY OF HOMOCYSTEINE: CPT

## 2023-08-28 PROCEDURE — 85598 HEXAGNAL PHOSPH PLTLT NEUTRL: CPT

## 2023-08-28 PROCEDURE — 86301 IMMUNOASSAY TUMOR CA 19-9: CPT

## 2023-08-28 PROCEDURE — 85730 THROMBOPLASTIN TIME PARTIAL: CPT

## 2023-08-28 PROCEDURE — 88187 FLOWCYTOMETRY/READ 2-8: CPT

## 2023-08-28 PROCEDURE — 83540 ASSAY OF IRON: CPT

## 2023-08-28 PROCEDURE — 80053 COMPREHEN METABOLIC PANEL: CPT

## 2023-08-28 PROCEDURE — 82728 ASSAY OF FERRITIN: CPT

## 2023-08-28 PROCEDURE — 82607 VITAMIN B-12: CPT

## 2023-08-28 PROCEDURE — 82746 ASSAY OF FOLIC ACID SERUM: CPT

## 2023-08-28 PROCEDURE — 85705 THROMBOPLASTIN INHIBITION: CPT

## 2023-08-28 PROCEDURE — 36415 COLL VENOUS BLD VENIPUNCTURE: CPT

## 2023-08-28 RX ORDER — AMLODIPINE BESYLATE 5 MG/1
5 TABLET ORAL DAILY
Qty: 90 TABLET | Refills: 3 | Status: SHIPPED | OUTPATIENT
Start: 2023-08-28 | End: 2023-11-26

## 2023-08-28 RX ORDER — METHOCARBAMOL 500 MG/1
500 TABLET, FILM COATED ORAL 4 TIMES DAILY
Qty: 30 TABLET | Refills: 0 | Status: SHIPPED | OUTPATIENT
Start: 2023-08-28 | End: 2023-09-03 | Stop reason: SDUPTHER

## 2023-08-28 NOTE — PROGRESS NOTES
[address] [facility] Formerly [facility] [address].  1970      PRIMARY HEMATOLOGIC/ONCOLOGIC DIAGNOSIS:  1. Chronic thrombus in distal SFA  2. 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 Right adrenal nodule  4. Multiple subcentimeter foci of hyperenhancement also seen in the liver which are compatible with vascular shunting, however metastatic lesions would be a consideration in the context of suspected pancreatic neoplasm. SECONDARY DIAGNOSES:  1. Smoker. Quit smoking 8/2023    HISTORY OF PRESENT ILLNESS:  Olena Jose is a 47yo male who presents for management of arterial thrombosis. On 7/17/23 the patient was working when his RLE became numb from his knee down. He was diagnosed with sciatica at [facility]. He then followed up with his PCP who initiated the work-up for PVD. While undergoing work-up the patient presented to the ED with RLE pain on 7/28/23. Vascular lower limb arterial duplex was c/w acute occlusion of the mid and distal superficial femoral artery. There was an occlusion of the distal anterior tibial artery. Abdominal CTA w/ runoff c/w focal mid to distal right SFA occlusion with short interval reconstitution. A 2.7 mm arterially enhancing lesion within the tail of the pancreas was noted. Cystic lesion within the right kidney measuring up to 2.7 cm, demonstrating mural calcifications. Right adrenal nodule measuring up to 1.5 cm. The patient was evaluated by surgical oncology and MRI abdomen was ordered-- report not available yet. CTA 7/31/23 showed numerous groundglass opacities throughout the upper lobes concerning for atypical infectious process. He denies any infectious symptoms. Redemonstration of a hyperenhancing 7 mm lesion in the pancreatic tail suggestive of a pancreatic neuroendocrine tumor.  There are multiple subcentimeter foci of hyperenhancement also seen in the liver which are compatible with vascular shunting, however metastatic lesions would be a consideration in the context of suspected pancreatic neoplasm. Right adrenal nodule measuring up to 1.5 cm. Its imaging features were diagnostic of benign adrenal adenoma, and did not need further follow-up or work-up. The patient is status post right SFA cutdown, balloon angioplasty, stenting, right leg fasciotomy on 7/29/23. He is s/p additional washout and fasciotomy closure on 8/3/23. He follows with vascular surgery. He is on Coumadin. The patient used to smoke 1ppd. He quit smoking 8/2023 after his hospital admission. PAST MEDICAL,PAST SURGICAL, FAMILY AND SOCIAL HISTORY:    Patient Active Problem List   Diagnosis   • Abnormal chest CT   • Tobacco dependence   • Essential hypertension   • Annual physical exam   • Prediabetes   • Hyperlipidemia, mixed   • Pain in right leg   • Arterial occlusion   • Acute blood loss anemia   • Insomnia   • Pancreatic lesion   • Adrenal nodule (HCC)     Past Medical History:   Diagnosis Date   • Hypertension    • Lung nodule    • Prediabetes      Past Surgical History:   Procedure Laterality Date   • IR LOWER EXTREMITY ANGIOGRAM  7/29/2023   • THROMBECTOMY W/ EMBOLECTOMY Right 7/29/2023    Procedure: RIGHT LEG BALLOON ANGIOPLASTY, STENT, FASCIOTOMY;  Surgeon: Madina Galvan MD;  Location: [facility];  Service: Vascular   • WOUND DEBRIDEMENT Right 8/3/2023    Procedure: DEBRIDEMENT LOWER EXTREMITY ([address] [place] [address] OUT) R LEG FASCIOTOMY WOUND WASHOUT, DEBRIDEMENT, AND POSSIBLE CLOSURE VS VAC PLACEMENT;  Surgeon: Shoshana Cardozo DO;  Location: [facility];  Service: Vascular     No family history on file.   Social History     Socioeconomic History   • Marital status: /Civil Union     Spouse name: Not on file   • Number of children: Not on file   • Years of education: Not on file   • Highest education level: Not on file   Occupational History   • Not on file   Tobacco Use   • Smoking status: Former     Packs/day: 0.50     Types: Cigarettes     Start date: 80     Quit date: 2023     Years since quittin.0     Passive exposure: Never   • Smokeless tobacco: Never   Vaping Use   • Vaping Use: Never used   Substance and Sexual Activity   • Alcohol use: Yes     Alcohol/week: 14.0 standard drinks of alcohol     Types: 14 Cans of beer per week   • Drug use: No   • Sexual activity: Not on file   Other Topics Concern   • Not on file   Social History Narrative   • Not on file     Social Determinants of Health     Financial Resource Strain: Not on file   Food Insecurity: No Food Insecurity (2023)    Hunger Vital Sign    • Worried About Running Out of Food in the Last Year: Never true    • Ran Out of Food in the Last Year: Never true   Transportation Needs: No Transportation Needs (2023)    PRAPARE - Transportation    • Lack of Transportation (Medical): No    • Lack of Transportation (Non-Medical):  No   Physical Activity: Not on file   Stress: Not on file   Social Connections: Not on file   Intimate Partner Violence: Not on file   Housing Stability: Low Risk  (2023)    Housing Stability Vital Sign    • Unable to Pay for Housing in the Last Year: No    • Number of Places Lived in the Last Year: 1    • Unstable Housing in the Last Year: No       Current Outpatient Medications:   •  acetaminophen (TYLENOL) 325 mg tablet, Take 2 tablets (650 mg total) by mouth every 6 (six) hours as needed for mild pain, Disp: , Rfl:   •  amLODIPine (NORVASC) 5 mg tablet, Take 1 tablet (5 mg total) by mouth daily, Disp: 90 tablet, Rfl: 3  •  atorvastatin (LIPITOR) 40 mg tablet, Take 1 tablet (40 mg total) by mouth daily after dinner, Disp: 30 tablet, Rfl: 0  •  clopidogrel (PLAVIX) 75 mg tablet, Take 1 tablet (75 mg total) by mouth daily, Disp: 30 tablet, Rfl: 0  •  gabapentin (NEURONTIN) 300 mg capsule, Take 1 capsule (300 mg total) by mouth 3 (three) times a day, Disp: 90 capsule, Rfl: 0  • methocarbamol (ROBAXIN) 500 mg tablet, Take 1 tablet (500 mg total) by mouth 4 (four) times a day, Disp: 30 tablet, Rfl: 0  •  povidone-iodine (BETADINE) 10 % external solution, Apply 1 Application topically as needed for wound care, Disp: 118 mL, Rfl: 1  •  traZODone (DESYREL) 50 mg tablet, Take 1 tablet (50 mg total) by mouth daily at bedtime, Disp: 30 tablet, Rfl: 0  •  warfarin (Coumadin) 5 mg tablet, Take 5mg daily,or as directed based on INR results, Disp: 90 tablet, Rfl: 0  •  enoxaparin (LOVENOX) 80 mg/0.8 mL, Inject 0.7 mL (70 mg total) under the skin every 12 (twelve) hours (Patient not taking: Reported on 8/17/2023), Disp: 10 mL, Rfl: 0  •  nicotine (NICODERM CQ) 14 mg/24hr TD 24 hr patch, Place 1 patch on the skin over 24 hours daily (Patient not taking: Reported on 8/15/2023), Disp: 28 patch, Rfl: 0  •  senna (SENOKOT) 8.6 mg, Take 1 tablet (8.6 mg total) by mouth daily at bedtime Stool softener for constipation. Hold loose stools (Patient not taking: Reported on 8/17/2023), Disp: , Rfl:   No Known Allergies  Vitals:    08/28/23 0817   BP: 122/78   Pulse: 80   Resp: 18   Temp: 98.2 °F (36.8 °C)   SpO2: 98%       ROS:  CONSTITUTIONAL:  No fever. No chills. No dizziness. No weakness. EYES:  No pain, erythema, or discharge. No blurring of vision. ENT:  No sore throat, URI symptoms. No epistaxis. No tinnitus. CARDIOVASCULAR:  No chest pain. No palpitations. No lower extremity edema. RESPIRATORY:  No shortness of breath, cough, pain with respiration, pleuritic chest pain. No hemoptysis. No dyspnea. No paroxysmal nocturnal dyspnea. GASTROINTESTINAL:  Normal appetite. No nausea, vomiting, diarrhea. No pain. No bloating. No melena. GENITOURINARY:  No frequency, urgency, nocturia. No hematuria or dysuria. MUSCULOSKELETAL:  No arthralgias or myalgias. INTEGUMENTARY:  No swelling. No bruising. No contusions. No abrasions. No lymphangitis. NEUROLOGIC:  No headache. No neck pain.  No numbness or tingling of the extremities. No weakness. PSYCHIATRIC:  No confusion. ENDOCRINE:  No fatigue. No weakness. No history of thyroid, diabetes or adrenal problems. HEMATOLOGICAL:  No bleeding. No petechiae. No bruising.     PHYSICAL EXAM:    GENERAL: AAO x 3  HEENT: AT,NC  CVS: S1S2 RRR  LUNGS: CTA b/l  ABD: NT,ND, +BS  EXTR: no edema  NEURO: CN II-XII grossly intact    LABS:  I have reviewed pertinent labs:  CBC:   Lab Results   Component Value Date    WBC 11.46 (H) 08/05/2023    RBC 4.19 08/05/2023    HGB 11.8 (L) 08/05/2023    HCT 34.5 (L) 08/05/2023    MCV 82 08/05/2023     (H) 08/05/2023    MCH 28.2 08/05/2023    MCHC 34.2 08/05/2023    RDW 13.2 08/05/2023    MPV 8.5 (L) 08/05/2023    NEUTROABS 10.91 (H) 07/31/2023     CMP:   Lab Results   Component Value Date    SODIUM 137 08/05/2023    K 4.2 08/05/2023     08/05/2023    CO2 27 08/05/2023    AGAP 5 08/05/2023    BUN 7 08/05/2023    CREATININE 0.74 08/05/2023    GLUC 99 08/05/2023    GLUF 97 07/21/2023    CALCIUM 9.0 08/05/2023    AST 38 07/31/2023    ALT 57 07/31/2023    ALKPHOS 73 07/31/2023    TP 7.1 07/31/2023    ALB 3.1 (L) 07/31/2023    TBILI 0.66 07/31/2023    EGFR 104 08/05/2023     Liver Enzymes:   Lab Results   Component Value Date    AST 38 07/31/2023    ALT 57 07/31/2023    ALKPHOS 73 07/31/2023    TP 7.1 07/31/2023    ALB 3.1 (L) 07/31/2023    TBILI 0.66 07/31/2023     Vitamin B12 No results found for: "BQAFIHTD82"  Iron Study No results found for: "RETIC", "RETICCTPCT", "FERRITIN", "CONCFE", "TIBC", "PRIMIDONE", "Viviann Ingles", "IRON"  Folate No results found for: "FOLATE"  Magnesium No results found for: "MG"  Phosphorus No results found for: "PHOS"  Coagulation Panel   Lab Results   Component Value Date    PROTIME 34.7 (H) 08/24/2023    INR 3.32 (H) 08/24/2023    PTT 89 (H) 08/04/2023       IMAGING:  CTA chest abdomen pelvis w wo contrast    Result Date: 7/31/2023  Narrative: CTA - CHEST, ABDOMEN AND PELVIS - WITHOUT AND WITH IV CONTRAST INDICATION:   Evaluate for aortic thrombus formation. COMPARISON: CT angiogram July 29, 2023 TECHNIQUE: CT examination of the chest, abdomen and pelvis was performed both prior to and after the administration of intravenous contrast.  The noncontrast portion of this examination was performed utilizing low radiation dose technique. Thin section  angiographic arterial phase post contrast technique was used in order to evaluate for aortic dissection. 3D reformatted images and volume rendering were performed on an independent workstation. Additionally, axial, sagittal, and coronal 2D reformatted  images were created from the source data and submitted for interpretation. Radiation dose length product (DLP) for this visit:  1693.92 mGy-cm . This examination, like all CT scans performed in the [facility], was performed utilizing techniques to minimize radiation dose exposure, including the use of iterative reconstruction and automated exposure control. IV Contrast:  100 mL of iohexol (OMNIPAQUE) Enteric Contrast:  Enteric contrast was not administered. FINDINGS: AORTA:  There is no aortic dissection or intramural hematoma. There is no aortic aneurysm. Atherosclerotic changes of the descending aorta and at the bifurcation, as well as bilateral external and internal iliac arteries. No significant stenosis. CHEST LUNGS: No consolidation. No edema. Minimal subpleural reticulation. Numerous groundglass opacities throughout both upper lobes. No suspicious solid nodules. PLEURA: No pleural effusion. No pneumothorax. HEART/PULMONARY ARTERIAL TREE: Calcification of the aortic annulus. MEDIASTINUM AND MELODIE:  Unremarkable. CHEST WALL AND LOWER NECK:   Unremarkable. ABDOMEN LIVER/BILIARY TREE: Normal size and morphology. Several tiny foci of hyperenhancement are noted, including: - Segment 4a measuring 0.5 cm (series 3/120, 601/40). - Segment 5 measuring 0..6 cm (series 3/121, 601/96).  - Segment 6 measuring 0.3 cm (series 3/131, 601/96). GALLBLADDER:  No calcified gallstones. No pericholecystic inflammatory change. SPLEEN:  Unremarkable. PANCREAS: Normal pancreatic parenchymal bulk and morphology. No main pancreatic ductal dilatation. No inflammation. Again seen is a 7 mm hyperenhancing focus in the pancreatic tail (series 3/114, 601/91). ADRENAL GLANDS: Normal left adrenal gland. There is a 1.5 x 1.1 cm low-attenuation (5 HU) right adrenal nodule, unchanged. KIDNEYS/URETERS: Redemonstration of 2.4 x 2.0 cm posterior right upper pole cystic lesion with multiple calcifications. No suspicious solid lesions. No calculi. No hydronephrosis. Junnie Frederick STOMACH AND BOWEL:  Unremarkable. APPENDIX:  No findings to suggest appendicitis. ABDOMINOPELVIC CAVITY:  No ascites or free intraperitoneal air. No lymphadenopathy. PELVIS REPRODUCTIVE ORGANS: The prostate is enlarged measuring 4.2 x 4.7 x 4.1 cm, 53 mm. URINARY BLADDER: Normal wall thickness. Small amount of intraluminal gas, likely related to recent catheterization. ABDOMINAL WALL/INGUINAL REGIONS: Post right femoral access subcutaneous changes without evidence of pseudoaneurysm. OSSEOUS STRUCTURES:  No acute fracture or destructive osseous lesion. Impression: 1. No acute aortic pathology. 2.  Numerous groundglass opacities throughout the upper lobes concerning for atypical infectious process. 3.  Redemonstration of a hyperenhancing 7 mm lesion in the pancreatic tail suggestive of a pancreatic neuroendocrine tumor. There are multiple subcentimeter foci of hyperenhancement also seen in the liver which are compatible with vascular shunting, however metastatic lesions would be a consideration in the context of suspected pancreatic neoplasm. Both findings could be further evaluated with contrast-enhanced MRI abdomen on an outpatient basis. 4.  Right adrenal nodule measuring up to 1.5 cm.  Its imaging features are diagnostic of benign adrenal adenoma, and does not need further follow-up or work-up. If there are clinical signs or symptoms of adrenal hyperfunction, biochemical evaluation may be appropriate. Adrenal recommendation based on institutional consensus and Journal of [employer] of Radiology 2017;14:3913-5681. The study was marked in EPIC for significant notification. Resident: Karlo Huber, the attending radiologist, have reviewed the images and agree with the final report above. Workstation performed: [id number]     IR lower extremity angiogram    Result Date: 2023  Narrative: Table formatting from the original result was not included. Images from the original result were not included. Jo-Ann Godoy MD Physician Vascular Surgery Op Note   Addendum Date of Service:  2023  3:34 PM Case Time:  Procedures:  Surgeons:  2023  3:34 PM RIGHT LEG BALLOON ANGIOPLASTY, STENT, FASCIOTOMY  MD Seth Harrison MD Daiva Fillers, DO                                                                                                                                                                                                                                                                                                                                                         OPERATIVE REPORT PATIENT NAME: Ad Tracy  :  1970 MRN: [id number] Pt Location: [facility] HYBR[facility] OR ROOM 02   SURGERY DATE: 2023   Surgeon(s) and Role:    * Jo-Ann Godoy MD - Primary    * Seth Little MD - Assisting    * Bird Garcia DO - Assisting   Preop Diagnosis: Pain in right leg [M79.604] Arterial occlusion [I70.90]   Post-Op Diagnosis Codes:    * Pain in right leg [M79.604]    * Arterial occlusion [I70.90]   Procedure(s): Right - RIGHT SFA CUTDOWN, RIGHT LEG ANGIOGRAM, RIGHT LEG BALLOON ANGIOPLASTY / STENT. RIGHT LEG FASCIOTOMY   Specimen(s): * No specimens in log *   Estimated Blood Loss: Minimal   Drains: Urethral Catheter Latex 16 Fr.  (Active) Number of days: 0     Anesthesia Type: General   Operative Indications: Pain in right leg [M79.604] Arterial occlusion [] 53-year-old male with past medical history of hypertension, hyperlipidemia and smoking who presents with 11-day history of right leg pain and difficulty bearing weight.  Patient started having sudden onset of right leg pain and inability to walk about 11 days ago. [place] Yap first went to [facility] where he was evaluated with a Doppler and everything was negative so he was sent home after Toradol shot. [place] Yap continued to have difficulty with walking and started using crutches.  He then went to see his family physician who ordered a Doppler which showed occlusion of the artery and was sent over to the emergency room.  Patient complains of swelling in his right outer calf and some mild numbness in the foot.  It is also very painful to move his foot.  On exam the right anterior and lateral compartment of the leg are significantly tender and swollen, per patient this has been ongoing for the last few days, could be element of compartment syndrome versus ischemia reperfusion related edema of the anterior compartment muscles.  He has been on a heparin drip since admission.  We will obtain CT angiogram which shows segmental occlusion of right SFA.  Patient will require right anterolateral fasciotomy and revascularization with possible thrombectomy of the SFA.  On exam today there is biphasic anterior tibial and posterior tibial signals.  Right foot cap refill less than 3 seconds.   Operative Findings: Chronic thrombus in distal SFA, about 2 cm occlusion. There is calcified plaque in that region, so could be acute on chronic process.   After 6x50 Viabhan and 6x40 mm Shagufta, there is complete resolution of the occlusion, remainder of the SFA, Popliteal trifucation are patent with 3 vessel runoff.   Bulging of anterior compartment muscles, healthy and contractile   Complications: None   Procedure and Technique: Pt was brought to Hybrid room with continuous heparin drip, general anesthesia was induced, jimenes catheter placed. Entire right leg was circumferentially prepped. Full time out was performed. Right SFA cutdown in upper thigh was made. After retracting the sartorius muscle we exposed the SFA and encircled it proximal and distally with vessel loops.   We punctured the SFA antegrade with micropuncture and microwire and placed micro sheath, then over bentson wire we placed a 6Fr sheath in to SFA. Left leg runoff was performed which showed focal occlusion of distal SFA with subacute thrombotic lesion in prior old plaque. There is a 3 vessel runoff.   So using V18 wire the lesion was crossed in true lumen. The vessel loop was used to occlude the SFA to prevent any antegrade flow so that we do not risk embolization during lesion treatment. A 6x50mm viabahn was placed across lesion and deployed under roadmap. Then it was post dilated with a 5x40mm balloon. There was some residual thrombus at the distal edge of stent. Hence a 6x40mm Shagufta stent was placed across this distal edge of stent with adequate overlap in to the Viabahn. This stent was not dilated with balloon to avoid risk of embolization of any thrombotic material.  Completion angiogram was satisfactory with complete treatment of lesion and 3-vessel runoff. The sheath and wire were removed and arteriotomy was closed with a 6-0 prolene U-stitch after flushing the artery.   We started with a fasciotomy of the anterior and lateral compartments. A generous incision was made between the tibia and the fibula using 15 blade. Then the fascia overlying the anterior compartment was divided along the leg sharply. There was diffuse bulging of the muscles of the anterior compartment. There were contractile and pink. They were not grossly necrotic. We then raised subcutaneous flaps and expose the lateral compartment.  An incision was made over the lateral compartment using Bovie electrocautery and these muscles were also bulging. And then the fasciotomy incision was extended proximally and distally to completely release the lateral compartment as well. Care was taken to avoid the area of the peroneal nerve proximally. 2-0 Nylon sutures vertical mattress skin sutures were taken but left untied along the fasciotomy incision.      I was present for the entire procedure.   Patient Disposition: PACU  and patient will return to OR for planned surgery as a staged procedure for fasciotomy closure at a later date.       SIGNATURE: Bravo Merida MD DATE: July 29, 2023 TIME: 5:05 PM           Vascular Quality Initiative - Peripheral Vascular Intervention   Urgency: Emergent   Functional Status:  Fully active; able to carry on all predisease activities without restriction. Ambulation: Amb = independently ambulatory   Leg Symptoms    RIGHT: Acute Ischemia:  Acute limb ischemia is defined as a sudden decrease in limb perfusion that causes a potential threat to limb viability (manifested by ischemic rest pain, ischemic ulcers, and/or gangrene) in patients who present within two weeks of the acute event  Immediately Threatened limbs: are salvageable with immediate revascularization. Sensory loss involves more than the toes and may be associated with rest pain. There is mild to moderate muscle weakness, arterial Doppler signals are usually inaudible, and venous Doppler signals are audible   LEFT : None   COVID Information COVID Symptoms Pre-Procedure: Asymptomatic  Treatment Delayed by Pandemic: None   Access Number of Sites: 1   Access Site 1:   Side 1: Right   Site 1: SFA   Access Guidance 1:Open Exposure   Largest Sheath Size 1: 6 Fr.    Closure Device 1: None   None   Procedure Fluoro Time: 11.4 minutes Contrast Volume: Visipaque 90 ml DAP: 8.46 Gy.cm2 CO2: no Anticoagulant: Heparin  Protamine: No If Creatinine is > 1.2 or missing, BOB Prophylaxis none  Treatment Details Indication: Occlusive Disease,  Completion Assessment Artery 1 treated: SFA        Right             Outflow: AT,PT,Peroneal: 3                Was this Site previously treated?: No         TASC Grade: B         Total Treated Length: 6 cm         Total Occluded Length: 3 cm         Calcification: Moderate (calcification on both sides of artery < half length of lesion)         Number of Treatment types (Devices): 2         Device 1         Treatment Type: Stent Graft              Diameter: 6 mm         Length: 50 mm        Device 2         Treatment Type: Stent,  Drug Eluting Stent              Diameter: 6 mm         Length: 40 mm     Device 3         Treatment Type: Plain balloon           Concomitant: None         Technical result: Successful (stenosis <=30%)    None   Post Procedure Patient currently taking:          Statin, Yes                                                 Antiplatelet Medication, Yes   Procedure Complications: No        Electronically signed by Segundo Yanez MD at 7/29/2023  7:59 PM     CTA abdominal w run off w wo contrast    Result Date: 7/31/2023  Narrative: CT ANGIOGRAM OF THE AORTA AND LOWER EXTREMITIES WITH IV CONTRAST INDICATION: Evaluate right lower extremity for occlusive disease. Sudden onset right lower extremity pain with inability to bear weight. COMPARISON: None. TECHNIQUE:  CT angiogram examination of the abdomen, pelvis, and lower extremities was performed according to standard protocol with intravenous contrast.  This examination, like all CT scans performed in the [facility], was performed utilizing techniques to minimize radiation dose exposure, including the use of iterative reconstruction and automated exposure control. 3D reconstructions were performed an independent workstation, and are supplied for review.    Rad dose 2155.32 mGy-cm IV Contrast:  100 mL of iohexol (OMNIPAQUE) FINDINGS: VASCULAR STRUCTURES: The visualized thoracoabdominal aorta is patent and of normal caliber, with moderate infrarenal mixed atherosclerotic disease, no significant stenosis. The mesenteric and renal arteries are patent. RIGHT: Common iliac artery: Patent without significant stenosis. External iliac artery: Patent. Internal iliac artery and branches: Patent. Common femoral artery: Patent. Profundofemoral artery and branches: Patent. Superficial femoral artery: Short segment focal occlusion of the mid to distal SFA, in a region of dense calcific plaque, axial series 2 image 246. There is short interval reconstitution. The remainder of the SFA is patent without focal abnormality. Popliteal artery: Patent. Patent three-vessel runoff. LEFT: Common iliac artery: Patent without significant stenosis. External iliac artery: Patent. Internal iliac artery and branches: Patent. Common femoral artery: Patent. Profundofemoral artery and branches: Patent. Superficial femoral artery: Patent. Popliteal artery: Patent. Patent three-vessel runoff. OTHER FINDINGS ABDOMEN LOWER CHEST:  No significant abnormality in the lung bases. LIVER/BILIARY TREE:  Unremarkable. GALLBLADDER:  No calcified gallstones. No pericholecystic inflammatory change. SPLEEN:  Unremarkable. Normal size. PANCREAS: Rounded arterially enhancing pancreatic tail lesion measuring 7 mm, axial series 2 image 30. Not completely evaluated on this single phase arterial exam. ADRENAL GLANDS: 1.5 x 1.0 cm nodule involving the medial limb of the right adrenal gland, axial series 2 image 20. Incompletely evaluated on this single phase arterial exam. The left adrenal gland is unremarkable. KIDNEYS/URETERS:  No solid renal mass. No hydronephrosis. No urinary tract calculi. 2.7 x 2.3 x 2.4 cm posterior right upper pole cystic lesion demonstrating several coarse mural calcifications along the posterior inferior wall.  Not completely evaluated on this single phase arterial exam. Subcentimeter rounded hypodensities in the bilateral kidneys are too small to characterize on CT, most likely represent cysts. PELVIS REPRODUCTIVE ORGANS:  Unremarkable for patient's age. URINARY BLADDER:  Unremarkable. ADDITIONAL ABDOMINAL AND PELVIC STRUCTURES STOMACH AND BOWEL: Unremarkable. ABDOMINOPELVIC CAVITY:   No pathologically enlarged mesenteric or retroperitoneal lymph nodes. No ascites or free intraperitoneal air. ABDOMINAL WALL/INGUINAL REGIONS:  Unremarkable. OSSEOUS STRUCTURES:  No acute fracture or destructive osseous lesion. Impression: 1. Focal mid to distal right SFA occlusion with short interval reconstitution. Patent three-vessel runoff. Vascular surgery is aware of these findings at the time of dictation. 2. 7 mm arterially enhancing lesion within the tail of the pancreas. 3. Cystic lesion within the right kidney measuring up to 2.7 cm, demonstrating mural calcifications. 4. Right adrenal nodule measuring up to 1.5 cm. The above incidental findings are incompletely evaluated on this single phase arterial exam. Recommend further work-up with a multiphase CT versus abdominal MRI / MRCP, with and without contrast. Workstation performed: [id number]     Echo complete w/ contrast if indicated    Result Date: 7/30/2023  Narrative: •  Left Ventricle: Left ventricular cavity size is normal. Wall thickness is normal. The left ventricular ejection fraction is 65%. Systolic function is normal. Wall motion is normal. Diastolic function is normal. •  Atrial Septum: No patent foramen ovale detected using color flow Doppler at rest. No diagnostic evidence of patent foramen ovale or intracardiac source of embolism. Technically limited evaluation for the same. If high clinical suspicion for intracardiac source of embolism, then transesophageal echocardiogram may provide additional information. I reviewed the above laboratory and imaging data. ASSESSMENT/PLAN:  1. 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 On anticoagulation with Coumadin. Follows with vascular surgery.  Possible etiologies of arterial occlusion include malignancy, APLS, hyperhomocysteinemia, DIC. Will obtain laboratory work-up. 2. 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 Await MRI results. Check Chromogranin. Will obtain SSR based imaging based on results of MRI. Check Chromogranin, CA 19-9.     3. Right adrenal nodule. Likely benign. 4. Multiple subcentimeter foci of hyperenhancement also seen in the liver which are compatible with vascular shunting, however metastatic lesions would be a consideration in the context of suspected pancreatic neoplasm. Await MRI report. 5. Leukocytosis, anemia and thrombocytosis. Check labs as ordered. Follow-up in 2 weeks.

## 2023-08-29 ENCOUNTER — OFFICE VISIT (OUTPATIENT)
Dept: VASCULAR SURGERY | Facility: CLINIC | Age: 53
End: 2023-08-29

## 2023-08-29 ENCOUNTER — TELEPHONE (OUTPATIENT)
Dept: GASTROENTEROLOGY | Facility: CLINIC | Age: 53
End: 2023-08-29

## 2023-08-29 ENCOUNTER — TELEPHONE (OUTPATIENT)
Dept: VASCULAR SURGERY | Facility: CLINIC | Age: 53
End: 2023-08-29

## 2023-08-29 VITALS
BODY MASS INDEX: 23.55 KG/M2 | DIASTOLIC BLOOD PRESSURE: 72 MMHG | HEART RATE: 66 BPM | SYSTOLIC BLOOD PRESSURE: 140 MMHG | HEIGHT: 69 IN | WEIGHT: 159 LBS

## 2023-08-29 DIAGNOSIS — I70.90 ARTERIAL OCCLUSION: ICD-10-CM

## 2023-08-29 DIAGNOSIS — M79.604 PAIN IN RIGHT LEG: Primary | ICD-10-CM

## 2023-08-29 DIAGNOSIS — K86.89 PANCREATIC MASS: Primary | ICD-10-CM

## 2023-08-29 DIAGNOSIS — M79.2 NEUROPATHIC PAIN OF RIGHT FOOT: ICD-10-CM

## 2023-08-29 LAB
CANCER AG19-9 SERPL-ACNC: 6 U/ML (ref 0–35)
DOPAMINE 24H UR-MRATE: 232 UG/24 HR (ref 0–510)
DOPAMINE UR-MCNC: 116 UG/L
EPINEPH 24H UR-MRATE: 2 UG/24 HR (ref 0–20)
EPINEPH UR-MCNC: 1 UG/L
HCYS SERPL-SCNC: 11.1 UMOL/L (ref 5–15)
IRON SATN MFR SERPL: 17 % (ref 15–50)
IRON SERPL-MCNC: 70 UG/DL (ref 50–212)
NOREPINEPH 24H UR-MRATE: 50 UG/24 HR (ref 0–135)
NOREPINEPH UR-MCNC: 25 UG/L
RENIN PLAS-CCNC: 1.98 NG/ML/HR (ref 0.17–5.38)
TIBC SERPL-MCNC: 410 UG/DL (ref 250–450)
UIBC SERPL-MCNC: 340 UG/DL (ref 155–355)

## 2023-08-29 PROCEDURE — 99024 POSTOP FOLLOW-UP VISIT: CPT | Performed by: SURGERY

## 2023-08-29 RX ORDER — CLOPIDOGREL BISULFATE 75 MG/1
75 TABLET ORAL DAILY
Qty: 90 TABLET | Refills: 1 | Status: SHIPPED | OUTPATIENT
Start: 2023-08-29

## 2023-08-29 RX ORDER — DULOXETIN HYDROCHLORIDE 60 MG/1
60 CAPSULE, DELAYED RELEASE ORAL DAILY
Qty: 30 CAPSULE | Refills: 0 | Status: SHIPPED | OUTPATIENT
Start: 2023-08-29

## 2023-08-29 NOTE — TELEPHONE ENCOUNTER
Received a call from pt's wife, Jb Ibrahim. She stated that [employer] did not receive the script for clopidogrel. [id number] [address] and spoke with the pharmacist. Pharmacist stated that the script was received a few minutes ago. Called Maria Luisa back and made her aware.

## 2023-08-29 NOTE — PROGRESS NOTES
Assessment/Plan:    Arterial occlusion  S/p R SFA cutdown, balloon angioplasty & stenting 7-29-23  as well as R leg fasciotomy with recent closure on 8/3/23    Right leg wound is healing well. 2+ right DP pulse. Swelling has imrpoved. Still have neuropathic pain in right foot. On gabapentin 300mg tid , will add cymbalta 60mg per day as well. Continue warfarin and plavix for now. Will decide about long term in 3-6 months. Doppler in 3 months. Diagnoses and all orders for this visit:    Pain in right leg  -     DULoxetine (CYMBALTA) 60 mg delayed release capsule; Take 1 capsule (60 mg total) by mouth daily  -     clopidogrel (PLAVIX) 75 mg tablet; Take 1 tablet (75 mg total) by mouth daily    Neuropathic pain of right foot  -     DULoxetine (CYMBALTA) 60 mg delayed release capsule; Take 1 capsule (60 mg total) by mouth daily    Arterial occlusion          Subjective:      Patient ID: Pilar Krause is a 46 y.o. male. HPI  Patient presents for eval of RLE incision s/p RLE intervention and fasciotomy done 7/29/23. The following portions of the patient's history were reviewed and updated as appropriate: allergies, current medications, past family history, past medical history, past social history, past surgical history and problem list.    Review of Systems   Constitutional: Negative. HENT: Negative. Eyes: Negative. Respiratory: Negative. Cardiovascular: Negative. Gastrointestinal: Negative. Endocrine: Negative. Genitourinary: Negative. Musculoskeletal: Negative. Skin: Positive for wound. Allergic/Immunologic: Negative. Neurological: Negative. Hematological: Negative. Psychiatric/Behavioral: Negative. I have reviewed the ROS as entered and made changes as necessary.       Objective:      /72 (BP Location: Right arm, Patient Position: Sitting, Cuff Size: Standard)   Pulse 66   Ht 5' 9" (1.753 m)   Wt 72.1 kg (159 lb)   BMI 23.48 kg/m² Physical Exam    2+ DP pulse  Right fasciotomy incision healing well.

## 2023-08-29 NOTE — ASSESSMENT & PLAN NOTE
S/p R SFA cutdown, balloon angioplasty & stenting 7-29-23  as well as R leg fasciotomy with recent closure on 8/3/23    Right leg wound is healing well. 2+ right DP pulse. Swelling has imrpoved. Still have neuropathic pain in right foot. On gabapentin 300mg tid , will add cymbalta 60mg per day as well. Continue warfarin and plavix for now. Will decide about long term in 3-6 months. Doppler in 3 months.

## 2023-08-29 NOTE — TELEPHONE ENCOUNTER
----- Message from Amanda Wagner MD sent at 8/29/2023  7:28 AM EDT -----  I think since it is in the tail and the read is fairly definitive I would refer to Dr Santi Cheng-  Surgical Oncology directly. He will likely do a pancreatic tail resection. EUS cant really see the tail well. Zeny Ochoa-  Can you directly schedule this patient with Dr Santi Cheng asap for pancreatic tail cyst/mass?    ----- Message -----  From: Sanjeev Hyatt DO  Sent: 8/28/2023  10:40 AM EDT  To: Amanda Wagner MD    Hi Dr. Jer Jaramillo,     Could you review this patients recent MRI Abd. Would you recommend urgent EUS due to findings?     Dr. Farhan Kirby

## 2023-08-29 NOTE — TELEPHONE ENCOUNTER
Patient is established with Dr. Titus Chou. Dunia Carmichael He had an appt 08/17 and has a fup appt 09/21. Dunia Carmichael

## 2023-08-29 NOTE — PATIENT INSTRUCTIONS
Swelling has imrpoved. Still have neuropathic pain in right foot. On gabapentin 300mg tid , will add cymbalta 60mg per day as well.

## 2023-08-29 NOTE — LETTER
August 29, 2023     Patient: Ryan Young  YOB: 1970  Date of Visit: 8/29/2023      To Whom it May Concern:    Ryan Young is under my professional care. Dunia Chavez was seen in my office on 8/29/2023. Dunia Chavez may return to work on 1 month depending on clinical condition as determined on next clinic visit in 4 weeks . If you have any questions or concerns, please don't hesitate to call.          Sincerely,          Segundo Yanez MD        CC: No Recipients

## 2023-08-30 LAB
BACTERIA SPT RESP CULT: ABNORMAL
CGA SERPL-MCNC: 39.2 NG/ML (ref 0–101.8)
GRAM STN SPEC: ABNORMAL
METANEPH FREE SERPL-MCNC: 10.2 PG/ML (ref 0–88)
NORMETANEPHRINE SERPL-MCNC: 63.3 PG/ML (ref 0–244)

## 2023-08-31 LAB
MISCELLANEOUS LAB TEST RESULT: NORMAL
SCAN RESULT: NORMAL

## 2023-09-03 DIAGNOSIS — M62.831 MUSCLE SPASM OF CALF: ICD-10-CM

## 2023-09-05 ENCOUNTER — OFFICE VISIT (OUTPATIENT)
Dept: FAMILY MEDICINE CLINIC | Facility: CLINIC | Age: 53
End: 2023-09-05
Payer: COMMERCIAL

## 2023-09-05 VITALS
SYSTOLIC BLOOD PRESSURE: 138 MMHG | TEMPERATURE: 98 F | DIASTOLIC BLOOD PRESSURE: 80 MMHG | WEIGHT: 155.13 LBS | HEIGHT: 69 IN | BODY MASS INDEX: 22.98 KG/M2 | OXYGEN SATURATION: 98 % | HEART RATE: 87 BPM

## 2023-09-05 DIAGNOSIS — I70.90 ARTERIAL OCCLUSION: Primary | ICD-10-CM

## 2023-09-05 PROCEDURE — 99213 OFFICE O/P EST LOW 20 MIN: CPT | Performed by: STUDENT IN AN ORGANIZED HEALTH CARE EDUCATION/TRAINING PROGRAM

## 2023-09-05 RX ORDER — METHOCARBAMOL 500 MG/1
500 TABLET, FILM COATED ORAL 4 TIMES DAILY
Qty: 120 TABLET | Refills: 0 | Status: SHIPPED | OUTPATIENT
Start: 2023-09-05

## 2023-09-05 NOTE — PROGRESS NOTES
Name: Tyson Machuca      : 1970      MRN: [id number]  Encounter Provider: Iva Otero DO  Encounter Date: 2023   Encounter department: [address] [facility]     1. Arterial occlusion  Assessment & Plan:  Staples removed by vascular surgery last week, wound is healing well, without any dehiscence or signs of infection. Patient to continue Plavix and Coumadin. Also started on gabapentin and Cymbalta for neuropathic pain. Subjective      Patient presents today for routine follow-up. Reports that he has been doing well, only concern is continued numbness in lateral right calf and medial right thigh. Patient reports that symptoms have been persistent despite gabapentin. He does report that his ambulation has been improving and he has been independent without using walker. Will be starting  in-house PT. Review of Systems   Constitutional: Negative for chills and fever. Respiratory: Negative for cough and shortness of breath. Cardiovascular: Negative for chest pain and palpitations. Gastrointestinal: Negative for abdominal pain, constipation, diarrhea, nausea and vomiting. Neurological: Positive for numbness. Negative for dizziness and headaches.         Numbness on right lateral calf and medial right thigh        Current Outpatient Medications on File Prior to Visit   Medication Sig   • acetaminophen (TYLENOL) 325 mg tablet Take 2 tablets (650 mg total) by mouth every 6 (six) hours as needed for mild pain   • amLODIPine (NORVASC) 5 mg tablet Take 1 tablet (5 mg total) by mouth daily   • atorvastatin (LIPITOR) 40 mg tablet Take 1 tablet (40 mg total) by mouth daily after dinner   • clopidogrel (PLAVIX) 75 mg tablet Take 1 tablet (75 mg total) by mouth daily   • DULoxetine (CYMBALTA) 60 mg delayed release capsule Take 1 capsule (60 mg total) by mouth daily   • gabapentin (NEURONTIN) 300 mg capsule Take 1 capsule (300 mg total) by mouth 3 (three) times a day   • methocarbamol (ROBAXIN) 500 mg tablet Take 1 tablet (500 mg total) by mouth 4 (four) times a day   • povidone-iodine (BETADINE) 10 % external solution Apply 1 Application topically as needed for wound care   • warfarin (Coumadin) 5 mg tablet Take 5mg daily,or as directed based on INR results   • [DISCONTINUED] enoxaparin (LOVENOX) 80 mg/0.8 mL Inject 0.7 mL (70 mg total) under the skin every 12 (twelve) hours (Patient not taking: Reported on 8/17/2023)   • [DISCONTINUED] nicotine (NICODERM CQ) 14 mg/24hr TD 24 hr patch Place 1 patch on the skin over 24 hours daily (Patient not taking: Reported on 8/15/2023)   • [DISCONTINUED] senna (SENOKOT) 8.6 mg Take 1 tablet (8.6 mg total) by mouth daily at bedtime Stool softener for constipation. Hold loose stools (Patient not taking: Reported on 8/17/2023)       Objective     /80 (BP Location: Right arm, Patient Position: Sitting, Cuff Size: Adult)   Pulse 87   Temp 98 °F (36.7 °C) (Temporal)   Ht 5' 9" (1.753 m)   Wt 70.4 kg (155 lb 2 oz)   SpO2 98%   BMI 22.91 kg/m²     Physical Exam  Vitals reviewed. Constitutional:       Appearance: Normal appearance. HENT:      Head: Normocephalic and atraumatic. Mouth/Throat:      Mouth: Mucous membranes are moist.      Pharynx: No oropharyngeal exudate or posterior oropharyngeal erythema. Eyes:      Extraocular Movements: Extraocular movements intact. Cardiovascular:      Rate and Rhythm: Normal rate and regular rhythm. Heart sounds: Normal heart sounds. Pulmonary:      Breath sounds: Normal breath sounds. Musculoskeletal:      Cervical back: Neck supple. No tenderness. Skin:     Comments: Surgical wound on right calf healing well, steri strips in place. Neurological:      General: No focal deficit present. Mental Status: He is alert and oriented to person, place, and time.    Psychiatric:         Mood and Affect: Mood normal.         Behavior: Behavior normal. Lavinia Rayo, DO

## 2023-09-05 NOTE — ASSESSMENT & PLAN NOTE
Staples removed by vascular surgery last week, wound is healing well, without any dehiscence or signs of infection. Patient to continue Plavix and Coumadin. Also started on gabapentin and Cymbalta for neuropathic pain.

## 2023-09-07 ENCOUNTER — TELEPHONE (OUTPATIENT)
Dept: VASCULAR SURGERY | Facility: CLINIC | Age: 53
End: 2023-09-07

## 2023-09-07 ENCOUNTER — APPOINTMENT (OUTPATIENT)
Dept: LAB | Facility: HOSPITAL | Age: 53
End: 2023-09-07
Attending: SURGERY
Payer: COMMERCIAL

## 2023-09-07 ENCOUNTER — ANTICOAG VISIT (OUTPATIENT)
Dept: VASCULAR SURGERY | Facility: CLINIC | Age: 53
End: 2023-09-07

## 2023-09-07 DIAGNOSIS — Z79.01 CURRENT USE OF LONG TERM ANTICOAGULATION: ICD-10-CM

## 2023-09-07 LAB
APTT HEX PL PPP: 5 SEC (ref 0–11)
APTT SCREEN TO CONFIRM RATIO: ABNORMAL RATIO
APTT-LA IMM 4:1 NP PPP: 43.7 SEC (ref 0–40.5)
CONFIRM APTT/NORMAL: 150.4 SEC (ref 0–47.6)
DRVVT IMM 1:2 NP PPP: 49.8 SEC (ref 0–40.4)
DRVVT SCREEN TO CONFIRM RATIO: 1.2 RATIO (ref 0.8–1.2)
INR PPP: 2.83 (ref 0.84–1.19)
LA PPP-IMP: ABNORMAL
MISCELLANEOUS LAB TEST RESULT: NORMAL
PROTHROMBIN TIME: 30.8 SECONDS (ref 11.6–14.5)
SCREEN APTT: 73.3 SEC (ref 0–43.5)
SCREEN DRVVT: 85.4 SEC (ref 0–47)
THROMBIN TIME: 17.1 SEC (ref 0–23)

## 2023-09-07 PROCEDURE — 85610 PROTHROMBIN TIME: CPT

## 2023-09-07 PROCEDURE — 36415 COLL VENOUS BLD VENIPUNCTURE: CPT

## 2023-09-08 ENCOUNTER — TELEPHONE (OUTPATIENT)
Dept: HEMATOLOGY ONCOLOGY | Facility: CLINIC | Age: 53
End: 2023-09-08

## 2023-09-08 LAB
B2 GLYCOPROT1 IGA SERPL IA-ACNC: 1.4
B2 GLYCOPROT1 IGG SERPL IA-ACNC: 1.6
B2 GLYCOPROT1 IGM SERPL IA-ACNC: <2.4
CARDIOLIPIN IGA SER IA-ACNC: 1.6
CARDIOLIPIN IGG SER IA-ACNC: 2.3
CARDIOLIPIN IGM SER IA-ACNC: 4.8

## 2023-09-08 NOTE — TELEPHONE ENCOUNTER
Lvm concerning the lab order that is in his chart and to complete at earliest convenience. Explained it was a different type of test then the other ones previously done.  Callback number was given

## 2023-09-08 NOTE — TELEPHONE ENCOUNTER
Patient Call    Who are you speaking with? Spouse    If it is not the patient, are they listed on an active communication consent form? Yes   What is the reason for this call? Patient had labs done on 8/28 and same labs were re ordered. Patient would like to know if it is needed to be done again or if the same ones can be used. Does this require a call back? Yes   If a call back is required, please list best call back number [phone]    If a call back is required, advise that a message will be forwarded to their care team and someone will return their call as soon as possible. Did you relay this information to the patient?  Yes

## 2023-09-08 NOTE — TELEPHONE ENCOUNTER
Daksha concerning the lab order that is in his chart and to complete at earliest convenience. Callback number was given    ----- Message from Padma Nicolas RN sent at 9/8/2023 10:46 AM EDT -----  Dr. Yudelka Frausto wanted to see if a urine she ordered can be added to a urine he recently completed, but unfortunately it can not be. Can you call him or send him a [employer] message instructing him to have that urine completed? Thank you!

## 2023-09-12 ENCOUNTER — OFFICE VISIT (OUTPATIENT)
Dept: HEMATOLOGY ONCOLOGY | Facility: CLINIC | Age: 53
End: 2023-09-12
Payer: COMMERCIAL

## 2023-09-12 ENCOUNTER — APPOINTMENT (OUTPATIENT)
Dept: LAB | Facility: HOSPITAL | Age: 53
End: 2023-09-12

## 2023-09-12 ENCOUNTER — TELEPHONE (OUTPATIENT)
Dept: HEMATOLOGY ONCOLOGY | Facility: CLINIC | Age: 53
End: 2023-09-12

## 2023-09-12 VITALS
HEART RATE: 81 BPM | TEMPERATURE: 98.1 F | SYSTOLIC BLOOD PRESSURE: 132 MMHG | RESPIRATION RATE: 18 BRPM | OXYGEN SATURATION: 100 % | HEIGHT: 69 IN | BODY MASS INDEX: 23.11 KG/M2 | DIASTOLIC BLOOD PRESSURE: 80 MMHG | WEIGHT: 156 LBS

## 2023-09-12 DIAGNOSIS — D72.829 LEUKOCYTOSIS, UNSPECIFIED TYPE: Primary | ICD-10-CM

## 2023-09-12 DIAGNOSIS — N28.1 RENAL CYST: ICD-10-CM

## 2023-09-12 DIAGNOSIS — K86.9 PANCREATIC LESION: Primary | ICD-10-CM

## 2023-09-12 DIAGNOSIS — K76.9 LIVER LESION: ICD-10-CM

## 2023-09-12 DIAGNOSIS — K86.9 PANCREATIC LESION: ICD-10-CM

## 2023-09-12 PROCEDURE — 99215 OFFICE O/P EST HI 40 MIN: CPT | Performed by: INTERNAL MEDICINE

## 2023-09-12 NOTE — PROGRESS NOTES
[address] [facility]ATOLOGY [facility]COL[facility]Y SPECIAL[facility] [address].  1970      PRIMARY HEMATOLOGIC/ONCOLOGIC DIAGNOSIS:  1. Chronic thrombus in distal SFA  2. 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 Right adrenal nodule  4. Multiple subcentimeter foci of hyperenhancement also seen in the liver which are compatible with vascular shunting, however metastatic lesions would be a consideration in the context of suspected pancreatic neoplasm. SECONDARY DIAGNOSES:  1. Smoker. Quit smoking 8/2023    HISTORY OF PRESENT ILLNESS:  Camron Bowman is a 47yo male who presents for management of arterial thrombosis. On 7/17/23 the patient was working when his RLE became numb from his knee down. He was diagnosed with sciatica at [facility]. He then followed up with his PCP who initiated the work-up for PVD. While undergoing work-up the patient presented to the ED with RLE pain on 7/28/23. Vascular lower limb arterial duplex was c/w acute occlusion of the mid and distal superficial femoral artery. There was an occlusion of the distal anterior tibial artery. Abdominal CTA w/ runoff c/w focal mid to distal right SFA occlusion with short interval reconstitution. A 2.7 mm arterially enhancing lesion within the tail of the pancreas was noted. Cystic lesion within the right kidney measuring up to 2.7 cm, demonstrating mural calcifications. Right adrenal nodule measuring up to 1.5 cm. The patient was evaluated by surgical oncology and MRI abdomen was ordered-- report not available yet. CTA 7/31/23 showed numerous groundglass opacities throughout the upper lobes concerning for atypical infectious process. He denies any infectious symptoms. Redemonstration of a hyperenhancing 7 mm lesion in the pancreatic tail suggestive of a pancreatic neuroendocrine tumor.  There are multiple subcentimeter foci of hyperenhancement also seen in the liver which are compatible with vascular shunting, however metastatic lesions would be a consideration in the context of suspected pancreatic neoplasm. Right adrenal nodule measuring up to 1.5 cm. Its imaging features were diagnostic of benign adrenal adenoma, and did not need further follow-up or work-up. The patient is status post right SFA cutdown, balloon angioplasty, stenting, right leg fasciotomy on 7/29/23. He is s/p additional washout and fasciotomy closure on 8/3/23. He follows with vascular surgery. He is on Coumadin. The patient used to smoke 1ppd. He quit smoking 8/2023 after his hospital admission. INTERIM HISTORY:  The patient presents for a follow-up. He has no new complaints. PAST MEDICAL,PAST SURGICAL, FAMILY AND SOCIAL HISTORY:    Patient Active Problem List   Diagnosis   • Abnormal chest CT   • Tobacco dependence   • Essential hypertension   • Annual physical exam   • Prediabetes   • Hyperlipidemia, mixed   • Pain in right leg   • Arterial occlusion   • Acute blood loss anemia   • Insomnia   • Pancreatic lesion   • Adrenal nodule (HCC)     Past Medical History:   Diagnosis Date   • Hypertension    • Lung nodule    • Prediabetes      Past Surgical History:   Procedure Laterality Date   • IR LOWER EXTREMITY ANGIOGRAM  7/29/2023   • THROMBECTOMY W/ EMBOLECTOMY Right 7/29/2023    Procedure: RIGHT LEG BALLOON ANGIOPLASTY, STENT, FASCIOTOMY;  Surgeon: Kay Barajas MD;  Location: [facility];  Service: Vascular   • WOUND DEBRIDEMENT Right 8/3/2023    Procedure: DEBRIDEMENT LOWER EXTREMITY ([address]) R LEG FASCIOTOMY WOUND WASHOUT, DEBRIDEMENT, AND POSSIBLE CLOSURE VS VAC PLACEMENT;  Surgeon: Bernard Salazar DO;  Location: [facility];  Service: Vascular     No family history on file.   Social History     Socioeconomic History   • Marital status: /Civil Union     Spouse name: Not on file   • Number of children: Not on file   • Years of education: Not on file   • Highest education level: Not on file   Occupational History   • Not on file   Tobacco Use   • Smoking status: Former     Packs/day: 0.50     Types: Cigarettes     Start date: 80     Quit date: 2023     Years since quittin.1     Passive exposure: Never   • Smokeless tobacco: Never   Vaping Use   • Vaping Use: Never used   Substance and Sexual Activity   • Alcohol use: Yes     Alcohol/week: 14.0 standard drinks of alcohol     Types: 14 Cans of beer per week   • Drug use: No   • Sexual activity: Not on file   Other Topics Concern   • Not on file   Social History Narrative   • Not on file     Social Determinants of Health     Financial Resource Strain: Not on file   Food Insecurity: No Food Insecurity (2023)    Hunger Vital Sign    • Worried About Running Out of Food in the Last Year: Never true    • Ran Out of Food in the Last Year: Never true   Transportation Needs: No Transportation Needs (2023)    PRAPARE - Transportation    • Lack of Transportation (Medical): No    • Lack of Transportation (Non-Medical):  No   Physical Activity: Not on file   Stress: Not on file   Social Connections: Not on file   Intimate Partner Violence: Not on file   Housing Stability: Low Risk  (2023)    Housing Stability Vital Sign    • Unable to Pay for Housing in the Last Year: No    • Number of Places Lived in the Last Year: 1    • Unstable Housing in the Last Year: No       Current Outpatient Medications:   •  acetaminophen (TYLENOL) 325 mg tablet, Take 2 tablets (650 mg total) by mouth every 6 (six) hours as needed for mild pain, Disp: , Rfl:   •  amLODIPine (NORVASC) 5 mg tablet, Take 1 tablet (5 mg total) by mouth daily, Disp: 90 tablet, Rfl: 3  •  atorvastatin (LIPITOR) 40 mg tablet, Take 1 tablet (40 mg total) by mouth daily after dinner, Disp: 30 tablet, Rfl: 0  •  clopidogrel (PLAVIX) 75 mg tablet, Take 1 tablet (75 mg total) by mouth daily, Disp: 90 tablet, Rfl: 1  •  DULoxetine (CYMBALTA) 60 mg delayed release capsule, Take 1 capsule (60 mg total) by mouth daily, Disp: 30 capsule, Rfl: 0  •  gabapentin (NEURONTIN) 300 mg capsule, Take 1 capsule (300 mg total) by mouth 3 (three) times a day, Disp: 90 capsule, Rfl: 0  •  methocarbamol (ROBAXIN) 500 mg tablet, Take 1 tablet (500 mg total) by mouth 4 (four) times a day, Disp: 120 tablet, Rfl: 0  •  povidone-iodine (BETADINE) 10 % external solution, Apply 1 Application topically as needed for wound care, Disp: 118 mL, Rfl: 1  •  warfarin (Coumadin) 5 mg tablet, Take 5mg daily,or as directed based on INR results, Disp: 90 tablet, Rfl: 0  No Known Allergies  Vitals:    09/12/23 1346   BP: 132/80   Pulse: 81   Resp: 18   Temp: 98.1 °F (36.7 °C)   SpO2: 100%       ROS:  CONSTITUTIONAL:  No fever. No chills. No dizziness. No weakness. EYES:  No pain, erythema, or discharge. No blurring of vision. ENT:  No sore throat, URI symptoms. No epistaxis. No tinnitus. CARDIOVASCULAR:  No chest pain. No palpitations. No lower extremity edema. RESPIRATORY:  No shortness of breath, cough, pain with respiration, pleuritic chest pain. No hemoptysis. No dyspnea. No paroxysmal nocturnal dyspnea. GASTROINTESTINAL:  Normal appetite. No nausea, vomiting, diarrhea. No pain. No bloating. No melena. GENITOURINARY:  No frequency, urgency, nocturia. No hematuria or dysuria. MUSCULOSKELETAL:  No arthralgias or myalgias. INTEGUMENTARY:  No swelling. No bruising. No contusions. No abrasions. No lymphangitis. NEUROLOGIC:  No headache. No neck pain. No numbness or tingling of the extremities. No weakness. PSYCHIATRIC:  No confusion. ENDOCRINE:  No fatigue. No weakness. No history of thyroid, diabetes or adrenal problems. HEMATOLOGICAL:  No bleeding. No petechiae. No bruising.     PHYSICAL EXAM:    GENERAL: AAO x 3  HEENT: AT,NC  CVS: S1S2 RRR  LUNGS: CTA b/l  ABD: NT,ND, +BS  EXTR: no edema  NEURO: CN II-XII grossly intact    LABS:  I have reviewed pertinent labs:  CBC:   Lab Results   Component Value Date WBC 12.18 (H) 08/28/2023    RBC 4.69 08/28/2023    HGB 12.8 08/28/2023    HCT 38.8 08/28/2023    MCV 83 08/28/2023     (H) 08/28/2023    MCH 27.3 08/28/2023    MCHC 33.0 08/28/2023    RDW 13.3 08/28/2023    MPV 8.5 (L) 08/28/2023    NEUTROABS 6.96 08/28/2023     CMP:   Lab Results   Component Value Date    SODIUM 135 08/28/2023    K 3.6 08/28/2023    CL 98 08/28/2023    CO2 30 08/28/2023    AGAP 7 08/28/2023    BUN 10 08/28/2023    CREATININE 0.74 08/28/2023    GLUC 109 08/28/2023    GLUF 97 07/21/2023    CALCIUM 10.0 08/28/2023    AST 19 08/28/2023    ALT 32 08/28/2023    ALKPHOS 95 08/28/2023    TP 9.0 (H) 08/28/2023    ALB 5.0 08/28/2023    TBILI 0.38 08/28/2023    EGFR 104 08/28/2023     Liver Enzymes:   Lab Results   Component Value Date    AST 19 08/28/2023    ALT 32 08/28/2023    ALKPHOS 95 08/28/2023    TP 9.0 (H) 08/28/2023    ALB 5.0 08/28/2023    TBILI 0.38 08/28/2023     Vitamin B12   Lab Results   Component Value Date    YBVBTLIS52 388 08/28/2023     Iron Study   Lab Results   Component Value Date    FERRITIN 78 08/28/2023    CONCFE 17 08/28/2023    TIBC 410 08/28/2023    IRON 70 08/28/2023     Folate   Lab Results   Component Value Date    FOLATE 6.7 08/28/2023     Magnesium No results found for: "MG"  Phosphorus No results found for: "PHOS"  Coagulation Panel   Lab Results   Component Value Date    PROTIME 30.8 (H) 09/07/2023    INR 2.83 (H) 09/07/2023    PTT 89 (H) 08/04/2023       IMAGING:  CTA chest abdomen pelvis w wo contrast    Result Date: 7/31/2023  Narrative: CTA - CHEST, ABDOMEN AND PELVIS - WITHOUT AND WITH IV CONTRAST INDICATION:   Evaluate for aortic thrombus formation. COMPARISON: CT angiogram July 29, 2023 TECHNIQUE: CT examination of the chest, abdomen and pelvis was performed both prior to and after the administration of intravenous contrast.  The noncontrast portion of this examination was performed utilizing low radiation dose technique.    Thin section  angiographic arterial phase post contrast technique was used in order to evaluate for aortic dissection. 3D reformatted images and volume rendering were performed on an independent workstation. Additionally, axial, sagittal, and coronal 2D reformatted  images were created from the source data and submitted for interpretation. Radiation dose length product (DLP) for this visit:  1693.92 mGy-cm . This examination, like all CT scans performed in the [facility], was performed utilizing techniques to minimize radiation dose exposure, including the use of iterative reconstruction and automated exposure control. IV Contrast:  100 mL of iohexol (OMNIPAQUE) Enteric Contrast:  Enteric contrast was not administered. FINDINGS: AORTA:  There is no aortic dissection or intramural hematoma. There is no aortic aneurysm. Atherosclerotic changes of the descending aorta and at the bifurcation, as well as bilateral external and internal iliac arteries. No significant stenosis. CHEST LUNGS: No consolidation. No edema. Minimal subpleural reticulation. Numerous groundglass opacities throughout both upper lobes. No suspicious solid nodules. PLEURA: No pleural effusion. No pneumothorax. HEART/PULMONARY ARTERIAL TREE: Calcification of the aortic annulus. MEDIASTINUM AND MELODIE:  Unremarkable. CHEST WALL AND LOWER NECK:   Unremarkable. ABDOMEN LIVER/BILIARY TREE: Normal size and morphology. Several tiny foci of hyperenhancement are noted, including: - Segment 4a measuring 0.5 cm (series 3/120, 601/40). - Segment 5 measuring 0..6 cm (series 3/121, 601/96). - Segment 6 measuring 0.3 cm (series 3/131, 601/96). GALLBLADDER:  No calcified gallstones. No pericholecystic inflammatory change. SPLEEN:  Unremarkable. PANCREAS: Normal pancreatic parenchymal bulk and morphology. No main pancreatic ductal dilatation. No inflammation. Again seen is a 7 mm hyperenhancing focus in the pancreatic tail (series 3/114, 601/91).  ADRENAL GLANDS: Normal left adrenal gland. There is a 1.5 x 1.1 cm low-attenuation (5 HU) right adrenal nodule, unchanged. KIDNEYS/URETERS: Redemonstration of 2.4 x 2.0 cm posterior right upper pole cystic lesion with multiple calcifications. No suspicious solid lesions. No calculi. No hydronephrosis. Rondi Long STOMACH AND BOWEL:  Unremarkable. APPENDIX:  No findings to suggest appendicitis. ABDOMINOPELVIC CAVITY:  No ascites or free intraperitoneal air. No lymphadenopathy. PELVIS REPRODUCTIVE ORGANS: The prostate is enlarged measuring 4.2 x 4.7 x 4.1 cm, 53 mm. URINARY BLADDER: Normal wall thickness. Small amount of intraluminal gas, likely related to recent catheterization. ABDOMINAL WALL/INGUINAL REGIONS: Post right femoral access subcutaneous changes without evidence of pseudoaneurysm. OSSEOUS STRUCTURES:  No acute fracture or destructive osseous lesion. Impression: 1. No acute aortic pathology. 2.  Numerous groundglass opacities throughout the upper lobes concerning for atypical infectious process. 3.  Redemonstration of a hyperenhancing 7 mm lesion in the pancreatic tail suggestive of a pancreatic neuroendocrine tumor. There are multiple subcentimeter foci of hyperenhancement also seen in the liver which are compatible with vascular shunting, however metastatic lesions would be a consideration in the context of suspected pancreatic neoplasm. Both findings could be further evaluated with contrast-enhanced MRI abdomen on an outpatient basis. 4.  Right adrenal nodule measuring up to 1.5 cm. Its imaging features are diagnostic of benign adrenal adenoma, and does not need further follow-up or work-up. If there are clinical signs or symptoms of adrenal hyperfunction, biochemical evaluation may be appropriate. Adrenal recommendation based on institutional consensus and Journal of [employer] [facility] of Radiology 2017;14:[id number]. The study was marked in EPIC for significant notification.  Resident: Angela Sanders, the attending radiologist, have reviewed the images and agree with the final report above. Workstation performed: [id number]     IR lower extremity angiogram    Result Date: 2023  Narrative: Table formatting from the original result was not included. Images from the original result were not included. Flex James MD Physician Vascular Surgery Op Note   Addendum Date of Service:  2023  3:34 PM Case Time:  Procedures:  Surgeons:  2023  3:34 PM RIGHT LEG BALLOON ANGIOPLASTY, STENT, FASCIOTOMY  MD Donna Lucas MD Cheryll Skinner, DO                                                                                                                                                                                                                                                                                                                                                         OPERATIVE REPORT PATIENT NAME: Ismael Marshall  :  1970 MRN: [id number] Pt Location: [facility] HYBRID OR ROOM 02   SURGERY DATE: 2023   Surgeon(s) and Role:    * Flex James MD - Primary    * Donna Lujan MD - Assisting    * Luba Avery DO - Assisting   Preop Diagnosis: Pain in right leg [M79.604] Arterial occlusion [I70.90]   Post-Op Diagnosis Codes:    * Pain in right leg [M79.604]    * Arterial occlusion [I70.90]   Procedure(s): Right - RIGHT SFA CUTDOWN, RIGHT LEG ANGIOGRAM, RIGHT LEG BALLOON ANGIOPLASTY / STENT. RIGHT LEG FASCIOTOMY   Specimen(s): * No specimens in log *   Estimated Blood Loss: Minimal   Drains: Urethral Catheter Latex 16 Fr.  (Active) Number of days: 0     Anesthesia Type: General   Operative Indications: Pain in right leg [M79.604] Arterial occlusion [] 67-year-old male with past medical history of hypertension, hyperlipidemia and smoking who presents with 11-day history of right leg pain and difficulty bearing weight.  Patient started having sudden onset of right leg pain and inability to walk about 11 days ago. [facility] first went to [facility] where he was evaluated with a Doppler and everything was negative so he was sent home after Toradol shot. [facility] continued to have difficulty with walking and started using crutches.  He then went to see his family physician who ordered a Doppler which showed occlusion of the artery and was sent over to the emergency room.  Patient complains of swelling in his right outer calf and some mild numbness in the foot.  It is also very painful to move his foot.  On exam the right anterior and lateral compartment of the leg are significantly tender and swollen, per patient this has been ongoing for the last few days, could be element of compartment syndrome versus ischemia reperfusion related edema of the anterior compartment muscles.  He has been on a heparin drip since admission. Bettyjane Heimlich will obtain CT angiogram which shows segmental occlusion of right SFA.  Patient will require right anterolateral fasciotomy and revascularization with possible thrombectomy of the SFA.  On exam today there is biphasic anterior tibial and posterior tibial signals.  Right foot cap refill less than 3 seconds.   Operative Findings: Chronic thrombus in distal SFA, about 2 cm occlusion. There is calcified plaque in that region, so could be acute on chronic process. After 6x50 Viabhan and 6x40 mm Shagufta, there is complete resolution of the occlusion, remainder of the SFA, Popliteal trifucation are patent with 3 vessel runoff.   Bulging of anterior compartment muscles, healthy and contractile   Complications: None   Procedure and Technique: Pt was brought to Hybrid room with continuous heparin drip, general anesthesia was induced, jimenes catheter placed. Entire right leg was circumferentially prepped. Full time out was performed. Right SFA cutdown in upper thigh was made.  After retracting the sartorius muscle we exposed the SFA and encircled it proximal and distally with vessel loops.   We punctured the SFA antegrade with micropuncture and microwire and placed micro sheath, then over bentson wire we placed a 6Fr sheath in to SFA. Left leg runoff was performed which showed focal occlusion of distal SFA with subacute thrombotic lesion in prior old plaque. There is a 3 vessel runoff.   So using V18 wire the lesion was crossed in true lumen. The vessel loop was used to occlude the SFA to prevent any antegrade flow so that we do not risk embolization during lesion treatment. A 6x50mm viabahn was placed across lesion and deployed under roadmap. Then it was post dilated with a 5x40mm balloon. There was some residual thrombus at the distal edge of stent. Hence a 6x40mm Shagufta stent was placed across this distal edge of stent with adequate overlap in to the Viabahn. This stent was not dilated with balloon to avoid risk of embolization of any thrombotic material.  Completion angiogram was satisfactory with complete treatment of lesion and 3-vessel runoff. The sheath and wire were removed and arteriotomy was closed with a 6-0 prolene U-stitch after flushing the artery.   We started with a fasciotomy of the anterior and lateral compartments. A generous incision was made between the tibia and the fibula using 15 blade. Then the fascia overlying the anterior compartment was divided along the leg sharply. There was diffuse bulging of the muscles of the anterior compartment. There were contractile and pink. They were not grossly necrotic. We then raised subcutaneous flaps and expose the lateral compartment. An incision was made over the lateral compartment using Bovie electrocautery and these muscles were also bulging. And then the fasciotomy incision was extended proximally and distally to completely release the lateral compartment as well. Care was taken to avoid the area of the peroneal nerve proximally.   2-0 Nylon sutures vertical mattress skin sutures were taken but left untied along the fasciotomy incision.      I was present for the entire procedure.   Patient Disposition: PACU  and patient will return to OR for planned surgery as a staged procedure for fasciotomy closure at a later date.       SIGNATURE: Carmen Barnes MD DATE: July 29, 2023 TIME: 5:05 PM           Vascular Quality Initiative - Peripheral Vascular Intervention   Urgency: Emergent   Functional Status:  Fully active; able to carry on all predisease activities without restriction. Ambulation: Amb = independently ambulatory   Leg Symptoms    RIGHT: Acute Ischemia:  Acute limb ischemia is defined as a sudden decrease in limb perfusion that causes a potential threat to limb viability (manifested by ischemic rest pain, ischemic ulcers, and/or gangrene) in patients who present within two weeks of the acute event  Immediately Threatened limbs: are salvageable with immediate revascularization. Sensory loss involves more than the toes and may be associated with rest pain. There is mild to moderate muscle weakness, arterial Doppler signals are usually inaudible, and venous Doppler signals are audible   LEFT : None   COVID Information COVID Symptoms Pre-Procedure: Asymptomatic  Treatment Delayed by Pandemic: None   Access Number of Sites: 1   Access Site 1:   Side 1: Right   Site 1: SFA   Access Guidance 1:Open Exposure   Largest Sheath Size 1: 6 Fr.    Closure Device 1: None   None   Procedure Fluoro Time: 11.4 minutes Contrast Volume: Visipaque 90 ml DAP: 8.46 Gy.cm2 CO2: no Anticoagulant: Heparin  Protamine: No If Creatinine is > 1.2 or missing, BOB Prophylaxis none  Treatment Details Indication: Occlusive Disease,  Completion Assessment Artery 1 treated: SFA        Right             Outflow: AT,PT,Peroneal: 3                Was this Site previously treated?: No         TASC Grade: B         Total Treated Length: 6 cm         Total Occluded Length: 3 cm         Calcification: Moderate (calcification on both sides of artery < half length of lesion)         Number of Treatment types (Devices): 2         Device 1         Treatment Type: Stent Graft              Diameter: 6 mm         Length: 50 mm        Device 2         Treatment Type: Stent,  Drug Eluting Stent              Diameter: 6 mm         Length: 40 mm     Device 3         Treatment Type: Plain balloon           Concomitant: None         Technical result: Successful (stenosis <=30%)    None   Post Procedure Patient currently taking:          Statin, Yes                                                 Antiplatelet Medication, Yes   Procedure Complications: No        Electronically signed by Bravo Merida MD at 7/29/2023  7:59 PM     CTA abdominal w run off w wo contrast    Result Date: 7/31/2023  Narrative: CT ANGIOGRAM OF THE AORTA AND LOWER EXTREMITIES WITH IV CONTRAST INDICATION: Evaluate right lower extremity for occlusive disease. Sudden onset right lower extremity pain with inability to bear weight. COMPARISON: None. TECHNIQUE:  CT angiogram examination of the abdomen, pelvis, and lower extremities was performed according to standard protocol with intravenous contrast.  This examination, like all CT scans performed in the [facility], was performed utilizing techniques to minimize radiation dose exposure, including the use of iterative reconstruction and automated exposure control. 3D reconstructions were performed an independent workstation, and are supplied for review. Rad dose 2155.32 mGy-cm IV Contrast:  100 mL of iohexol (OMNIPAQUE) FINDINGS: VASCULAR STRUCTURES: The visualized thoracoabdominal aorta is patent and of normal caliber, with moderate infrarenal mixed atherosclerotic disease, no significant stenosis. The mesenteric and renal arteries are patent. RIGHT: Common iliac artery: Patent without significant stenosis. External iliac artery: Patent. Internal iliac artery and branches: Patent. Common femoral artery: Patent. Profundofemoral artery and branches: Patent.  Superficial femoral artery: Short segment focal occlusion of the mid to distal SFA, in a region of dense calcific plaque, axial series 2 image 246. There is short interval reconstitution. The remainder of the SFA is patent without focal abnormality. Popliteal artery: Patent. Patent three-vessel runoff. LEFT: Common iliac artery: Patent without significant stenosis. External iliac artery: Patent. Internal iliac artery and branches: Patent. Common femoral artery: Patent. Profundofemoral artery and branches: Patent. Superficial femoral artery: Patent. Popliteal artery: Patent. Patent three-vessel runoff. OTHER FINDINGS ABDOMEN LOWER CHEST:  No significant abnormality in the lung bases. LIVER/BILIARY TREE:  Unremarkable. GALLBLADDER:  No calcified gallstones. No pericholecystic inflammatory change. SPLEEN:  Unremarkable. Normal size. PANCREAS: Rounded arterially enhancing pancreatic tail lesion measuring 7 mm, axial series 2 image 30. Not completely evaluated on this single phase arterial exam. ADRENAL GLANDS: 1.5 x 1.0 cm nodule involving the medial limb of the right adrenal gland, axial series 2 image 20. Incompletely evaluated on this single phase arterial exam. The left adrenal gland is unremarkable. KIDNEYS/URETERS:  No solid renal mass. No hydronephrosis. No urinary tract calculi. 2.7 x 2.3 x 2.4 cm posterior right upper pole cystic lesion demonstrating several coarse mural calcifications along the posterior inferior wall. Not completely evaluated on this single phase arterial exam. Subcentimeter rounded hypodensities in the bilateral kidneys are too small to characterize on CT, most likely represent cysts. PELVIS REPRODUCTIVE ORGANS:  Unremarkable for patient's age. URINARY BLADDER:  Unremarkable. ADDITIONAL ABDOMINAL AND PELVIC STRUCTURES STOMACH AND BOWEL: Unremarkable. ABDOMINOPELVIC CAVITY:   No pathologically enlarged mesenteric or retroperitoneal lymph nodes. No ascites or free intraperitoneal air.  ABDOMINAL WALL/INGUINAL REGIONS:  Unremarkable. OSSEOUS STRUCTURES:  No acute fracture or destructive osseous lesion. Impression: 1. Focal mid to distal right SFA occlusion with short interval reconstitution. Patent three-vessel runoff. Vascular surgery is aware of these findings at the time of dictation. 2. 7 mm arterially enhancing lesion within the tail of the pancreas. 3. Cystic lesion within the right kidney measuring up to 2.7 cm, demonstrating mural calcifications. 4. Right adrenal nodule measuring up to 1.5 cm. The above incidental findings are incompletely evaluated on this single phase arterial exam. Recommend further work-up with a multiphase CT versus abdominal MRI / MRCP, with and without contrast. Workstation performed: [id number]     Echo complete w/ contrast if indicated    Result Date: 7/30/2023  Narrative: •  Left Ventricle: Left ventricular cavity size is normal. Wall thickness is normal. The left ventricular ejection fraction is 65%. Systolic function is normal. Wall motion is normal. Diastolic function is normal. •  Atrial Septum: No patent foramen ovale detected using color flow Doppler at rest. No diagnostic evidence of patent foramen ovale or intracardiac source of embolism. Technically limited evaluation for the same. If high clinical suspicion for intracardiac source of embolism, then transesophageal echocardiogram may provide additional information. I reviewed the above laboratory and imaging data. ASSESSMENT/PLAN:  1. 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 On anticoagulation with Coumadin. Follows with vascular surgery. Llaboratory work-up unrevealing. 2. 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  Endoscopic ultrasound could be considered for further evaluation, though location in the far tail may make characterization difficult. Follows with surgical oncology. Will repeat MRI in 3m. Chromogranin. 5 HIAA pending. Chromogranin A normal at 39.2, CA 19-9 normal at 6.     3. Right adrenal nodule. Likely benign. 4. Multiple subcentimeter foci of hyperenhancement also seen in the liver which are compatible with vascular shunting, however metastatic lesions would be a consideration in the context of suspected pancreatic neoplasm. MRI--7 mm T2 hyperintense, enhancing lesion in segment IVB. Even though marked T2 hyperintensity and centripetal fill of contrast are present, thin complete ring enhancement on early phase is atypical for hemangioma. 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 Additionally, there is a DWI-only focus in segment VI and focus of hyperenhancement in segment II that are also potentially concerning for metastases. Findings warrant surveillance MRI in 3 months. 5. Leukocytosis, anemia and thrombocytosis. Jak2 mutational analysis w/ reflexing cascade pending. Follow-up in 12 weeks.

## 2023-09-12 NOTE — TELEPHONE ENCOUNTER
Patient is scheduled for MRI and f/u with Dr. Pendleton Paci 12/2023. Patient is in need of script for BUN and Creatinine. Thank you.

## 2023-09-21 ENCOUNTER — OFFICE VISIT (OUTPATIENT)
Dept: SURGICAL ONCOLOGY | Facility: CLINIC | Age: 53
End: 2023-09-21
Payer: COMMERCIAL

## 2023-09-21 VITALS
HEIGHT: 69 IN | WEIGHT: 162.5 LBS | OXYGEN SATURATION: 98 % | RESPIRATION RATE: 16 BRPM | DIASTOLIC BLOOD PRESSURE: 72 MMHG | BODY MASS INDEX: 24.07 KG/M2 | HEART RATE: 86 BPM | SYSTOLIC BLOOD PRESSURE: 124 MMHG | TEMPERATURE: 97.7 F

## 2023-09-21 DIAGNOSIS — M79.2 NEUROPATHIC PAIN OF RIGHT FOOT: ICD-10-CM

## 2023-09-21 DIAGNOSIS — K86.9 PANCREATIC LESION: ICD-10-CM

## 2023-09-21 DIAGNOSIS — M79.604 PAIN IN RIGHT LEG: ICD-10-CM

## 2023-09-21 DIAGNOSIS — E27.8 ADRENAL NODULE (HCC): Primary | ICD-10-CM

## 2023-09-21 PROCEDURE — 99215 OFFICE O/P EST HI 40 MIN: CPT | Performed by: SURGERY

## 2023-09-21 NOTE — LETTER
September 21, 2023     Geoff Harley DO  [address]    Patient: Sandhya Coelho   YOB: 1970   Date of Visit: 9/21/2023       Dear Dr. Tameka Sales: Thank you for referring Sandhya Coelho to me for evaluation. Below are my notes for this consultation. If you have questions, please do not hesitate to call me. I look forward to following your patient along with you. Sincerely,        Teresa Crews MD        CC: MD Glendy Barnett MD Glynis Breslow, MD  9/21/2023 12:22 PM  Incomplete               Surgical Oncology Follow Up       [facility] [address] [facility] SUR[facility]ICAL ONCOLOGY [address] [place] [address]  [address] [facility] [address]    Sandhya Coelho  1970  [id number]  [facility] [address] [facility] SURGICAL ONCOLOGY [place]  [place] [address]  [address] [facility]  [phone]    Diagnoses and all orders for this visit:    Adrenal nodule [facility])    Pancreatic lesion        Chief Complaint   Patient presents with   • Follow-up       Return in about 3 months (around 12/21/2023) for Office Visit. Oncology History    No history exists. 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 He had an MRI on August 23, 2023. There were several areas in the liver corresponding to areas of hyperenhancement. It was unclear if these were metastases or shunts. The lesion seen on CTA was not seen well on MRI. Adrenal lesion was felt to be a cyst.  A kidney cyst was seen as well. There was also concern for filling defects in the portal vein. I personally reviewed the films.   The patient is on both antiplatelet and anticoagulation with Plavix and Coumadin. He is feeling well. His functional adrenal work-up was negative and his tumor markers were negative. Review of Systems  Complete ROS Surg Onc:   Complete ROS Surg Onc:   Constitutional: The patient denies new or recent history of general fatigue, no recent weight loss, no change in appetite. Eyes: No complaints of visual problems, no scleral icterus. ENT: no complaints of ear pain, no hoarseness, no difficulty swallowing,  no tinnitus and no new masses in head, oral cavity, or neck. Cardiovascular: No complaints of chest pain, no palpitations, no ankle edema. Respiratory: No complaints of shortness of breath, no cough. Gastrointestinal: No complaints of jaundice, no bloody stools, no pale stools. Genitourinary: No complaints of dysuria, no hematuria, no nocturia, no frequent urination, no urethral discharge. Musculoskeletal: No complaints of weakness, paralysis, joint stiffness or arthralgias. Integumentary: No complaints of rash, no new lesions. Neurological: No complaints of convulsions, no seizures, no dizziness. Hematologic/Lymphatic: No complaints of easy bruising. Endocrine:  No hot or cold intolerance. No polydipsia, polyphagia, or polyuria. Allergy/immunology:  No environmental allergies. No food allergies. Not immunocompromised. Skin:  No pallor or rash. No wound.         Patient Active Problem List   Diagnosis   • Abnormal chest CT   • Tobacco dependence   • Essential hypertension   • Annual physical exam   • Prediabetes   • Hyperlipidemia, mixed   • Pain in right leg   • Arterial occlusion   • Acute blood loss anemia   • Insomnia   • Pancreatic lesion   • Adrenal nodule [facility])     Past Medical History:   Diagnosis Date   • Hypertension    • Lung nodule    • Prediabetes      Past Surgical History:   Procedure Laterality Date   • IR LOWER EXTREMITY ANGIOGRAM  7/29/2023   • THROMBECTOMY W/ EMBOLECTOMY Right 7/29/2023    Procedure: RIGHT LEG BALLOON ANGIOPLASTY, STENT, FASCIOTOMY;  Surgeon: Mikael Ellis MD;  Location: [facility];  Service: Vascular   • WOUND DEBRIDEMENT Right 8/3/2023    Procedure: DEBRIDEMENT LOWER EXTREMITY ([address]) R LEG FASCIOTOMY WOUND WASHOUT, DEBRIDEMENT, AND POSSIBLE CLOSURE VS VAC PLACEMENT;  Surgeon: Tammy Andres DO;  Location: [facility];  Service: Vascular     No family history on file. Social History     Socioeconomic History   • Marital status: /Civil Union     Spouse name: Not on file   • Number of children: Not on file   • Years of education: Not on file   • Highest education level: Not on file   Occupational History   • Not on file   Tobacco Use   • Smoking status: Former     Packs/day: 0.50     Types: Cigarettes     Start date: 80     Quit date: 2023     Years since quittin.1     Passive exposure: Never   • Smokeless tobacco: Never   Vaping Use   • Vaping Use: Never used   Substance and Sexual Activity   • Alcohol use: Yes     Alcohol/week: 14.0 standard drinks of alcohol     Types: 14 Cans of beer per week   • Drug use: No   • Sexual activity: Not on file   Other Topics Concern   • Not on file   Social History Narrative   • Not on file     Social Determinants of Health     Financial Resource Strain: Not on file   Food Insecurity: No Food Insecurity (2023)    Hunger Vital Sign    • Worried About Running Out of Food in the Last Year: Never true    • Ran Out of Food in the Last Year: Never true   Transportation Needs: No Transportation Needs (2023)    PRAPARE - Transportation    • Lack of Transportation (Medical): No    • Lack of Transportation (Non-Medical):  No   Physical Activity: Not on file   Stress: Not on file   Social Connections: Not on file   Intimate Partner Violence: Not on file   Housing Stability: Low Risk  (2023)    Housing Stability Vital Sign    • Unable to Pay for Housing in the Last Year: No    • Number of Places Lived in the Last Year: 1    • Unstable Housing in the Last Year: No       Current Outpatient Medications:   •  acetaminophen (TYLENOL) 325 mg tablet, Take 2 tablets (650 mg total) by mouth every 6 (six) hours as needed for mild pain, Disp: , Rfl:   •  amLODIPine (NORVASC) 5 mg tablet, Take 1 tablet (5 mg total) by mouth daily, Disp: 90 tablet, Rfl: 3  •  atorvastatin (LIPITOR) 40 mg tablet, Take 1 tablet (40 mg total) by mouth daily after dinner, Disp: 30 tablet, Rfl: 0  •  clopidogrel (PLAVIX) 75 mg tablet, Take 1 tablet (75 mg total) by mouth daily, Disp: 90 tablet, Rfl: 1  •  DULoxetine (CYMBALTA) 60 mg delayed release capsule, Take 1 capsule (60 mg total) by mouth daily, Disp: 30 capsule, Rfl: 0  •  gabapentin (NEURONTIN) 300 mg capsule, Take 1 capsule (300 mg total) by mouth 3 (three) times a day, Disp: 90 capsule, Rfl: 0  •  methocarbamol (ROBAXIN) 500 mg tablet, Take 1 tablet (500 mg total) by mouth 4 (four) times a day, Disp: 120 tablet, Rfl: 0  •  povidone-iodine (BETADINE) 10 % external solution, Apply 1 Application topically as needed for wound care, Disp: 118 mL, Rfl: 1  •  warfarin (Coumadin) 5 mg tablet, Take 5mg daily,or as directed based on INR results, Disp: 90 tablet, Rfl: 0  No Known Allergies  Vitals:    09/21/23 1150   BP: 124/72   Pulse: 86   Resp: 16   Temp: 97.7 °F (36.5 °C)   SpO2: 98%       Physical Exam  Constitutional: General appearance: The Patient is well-developed and well-nourished who appears the stated age in no acute distress. Patient is pleasant and talkative. HEENT:  Normocephalic. Sclerae are anicteric. Mucous membranes are moist. Neck is supple without adenopathy. No JVD. Chest: The lungs are clear to auscultation. Cardiac: Heart is regular rate. Abdomen: Abdomen is soft, non-tender, non-distended and without masses. Extremities: There is no clubbing or cyanosis. There is no edema. Symmetric. Neuro: Grossly nonfocal. Gait is normal.     Lymphatic: No evidence of cervical adenopathy bilaterally.    No evidence of axillary adenopathy bilaterally. Skin: Warm, anicteric. Psych:  Patient is pleasant and talkative. Breasts:        Pathology:  [unfilled]    Labs:  Component Ref Range & Units 8/24/23  8:57 AM   Norepinephrine Plasma 0 - 874 pg/mL 765    Epinephrine Plasma 0 - 62 pg/mL 18    Dopamine Plasma 0 - 48 pg/mL 60 High        Ref Range & Units 8/24/23  8:57 AM   Cortisol - AM 6.7 - 22.6 ug/dL 0.9 Low        Ref Range & Units 8/24/23  8:57 AM   Normetanephrine, Free 0.0 - 244.0 pg/mL 63.3    Metanephrine, Free 0.0 - 88.0 pg/mL 10.2       Ref Range & Units 8/25/23  1:44 PM   Epinephrine, 24H Ur 0 - 20 ug/24 hr 2    Norepinephrine, 24H Ur 0 - 135 ug/24 hr 50    Dopamine , 24H Ur 0 - 510 ug/24 hr 232    Epinephrine, Rand Ur Undefined ug/L 1    Comment: Total Volume: 2000 mL   Norepinephrine, Rand Ur Undefined ug/L 25    Dopamine, Rand Ur Undefined ug/L 116           Component Ref Range & Units 8/24/23  8:57 AM   Renin 0.167 - 5.380 ng/mL/hr 1.981          Ref Range & Units 8/28/23 10:04 AM   Chromogranin A 0.0 - 101.8 ng/mL 39.2      Ref Range & Units 8/28/23 10:04 AM   CA 19-9 0 - 35 U/mL 6              Imaging  MRI abdomen w wo contrast and mrcp    Result Date: 8/28/2023  Narrative: MRI OF THE ABDOMEN WITH AND WITHOUT CONTRAST WITH MRCP INDICATION: 58-year-old male with PNET suspected by CTA with concern for liver metastases. Also requiring further evaluation of adrenal nodule and renal lesion. COMPARISON: CTAs dated 7/29/2023 and 7/31/2023. TECHNIQUE:  Multiplanar/multisequence MRI of the abdomen with 3D MRCP was performed before and after administration of contrast. IV Contrast:  7 mL of Gadobutrol injection (SINGLE-DOSE) FINDINGS: LOWER CHEST:   Unremarkable. LIVER: Normal size and contour. No steatosis or evidence for iron overload. Background T2 signal is normal. --7 mm T2 hyperintense, mildly DWI hyperintense, rounded focus in segment IVB (4/107).  Early complete ring hyperenhancement (12/171) with centripetal fill on delayed. --Subtle DWI hyperintensity in segment V (4/78) corresponding to focus of hyperenhancement seen on 7/29/2023 (prior study 3/95). No associated abnormal enhancement on the current study. --6 mm focus of early hyperenhancement in segment II (12/36). No associated T2/DWI signal changes. Findings inconspicuous on delayed phases. This may represent a small arterial portal shunt or another enhancing lesion. --Other  segment 6 subcentimeter hyperenhancing foci seen on recent CTA show no definitive correlate on the current study. Fibrillar linear signal abnormality in the right and left portal veins may represent sequelae of of chronic web/clot. This persists from previous CTAs suggesting that it is real rather than mixing artifact. No occlusive clot. BILE DUCTS:  No intrahepatic or extrahepatic bile duct dilation. Common bile duct is normal in caliber. No choledocholithiasis, biliary stricture or suspicious mass. GALLBLADDER:  Normal. PANCREAS: Spatial resolution of MRI is lower than CT. The focus of arterial hyperenhancement seen on both recent CTAs is less conspicuous on MRI due to small size. Previous CTA appearance suggesting neuroendocrine tumor. No other solid masses or pancreatic ductal dilatation. ADRENAL GLANDS: 11 mm right adrenal cyst, likely sequela of prior hemorrhage due to infection or trauma. No suspicious adrenal masses on either side. SPLEEN:  Normal. KIDNEYS/PROXIMAL URETERS: Complicated right upper pole renal cystic lesion measuring 2.2 cm x 2.0 cm x 2.4 cm showing thin T2 hypointense hemosiderin rim. There is T1 intrinsically hyperintense material in a somewhat nodular configuration along its lateral wall. Subtraction image seems to suggest some of this tissue enhances measuring up to 3 mm in thickness (04416/67). This was questioned as misregistration artifact, but the tissue appears to enhance on prior CTA after contrast, also.  There are at  least 2 septations thinner septations evident, also. Other bilateral renal lesions are Bosniak II subcentimeter cysts. No perinephric collections. Renal collecting systems are decompressed. BOWEL:   No dilated loops of bowel. PERITONEUM/RETROPERITONEUM:  No ascites. LYMPH NODES:  No abdominal lymphadenopathy. VASCULAR STRUCTURES: Thin filling defects in the portal veins, as discussed above--also evident on prior CTAs in retrospect. Persistent speaks against mixing artifact. No occlusive thrombus. Abdominal aorta and branch vessels are normal in caliber. Single renal artery and vein on the right side. ABDOMINAL WALL:  Unremarkable. OSSEOUS STRUCTURES:  No suspicious osseous lesion. Impression: 1. 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 Endoscopic ultrasound could be considered for further evaluation, though location in the far tail may make characterization difficult. 2.  7 mm T2 hyperintense, enhancing lesion in segment IVB. Even though marked T2 hyperintensity and centripetal fill of contrast are present, thin complete ring enhancement on early phase is atypical for hemangioma. 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 Additionally, there is a DWI-only focus in segment VI and focus of hyperenhancement in segment II that are also potentially concerning for metastases. Findings warrant surveillance MRI in 3 months. 3.  Complicated 2.4 cm right renal cystic lesion with thin enhancing tissue along its lateral margin and 2 thin septations. Bosniak v2019 Category IIF. The large majority of Bosniak IIF lesions are benign. When malignant, nearly all are indolent. Though typically warranting 6 month follow-up for the first 12 months, in this case the finding will be reevaluated on the above-recommended 3-month follow-up. 4.  Right adrenal cyst suggests prior hemorrhage related to old infection or trauma.  5.  Possible chronic nonocclusive thrombus or web in the portal venous tree. Recommend CT of the abdomen in portal venous phase to confirm or refute the finding. SUMMARY OF RECOMMENDATIONS: 6.  Consider endoscopic ultrasound. 7.  CT of the abdomen after IV contrast in portal venous phase at the current time. 8.  Surveillance multiphase MRI without and with contrast of the abdomen in 3 months. Note: The study was marked in EPIC for immediate notification. Endoscopic and imaging follow-up reminder notifications were scheduled in the electronic medical record. Workstation performed: [id number]     I personally reviewed and interpreted the above laboratory and imaging data. Discussion/Summary: 54-year-old male with a 1.5 cm right adrenal nodule and an 8 mm pancreatic lesion. We had a long discussion regarding these lesions. The pancreatic lesion is likely a neuroendocrine tumor. This was not seen very well on the MRI. Endoscopic ultrasound could be performed, but I doubt this is adenocarcinoma. The lesion is 8 mm in size. His tumor markers are all normal.  I discussed if this was neuroendocrine tumor it would be highly unusual that there would be metastasis at that size. He is already set up for repeat MRI within the next 3 months. I think this is reasonable. If there are any changes on this MRI, I would proceed with intervention. Because he is already on anticoagulation and antiplatelets, I do not think anything further needs to be done regarding the questionable thrombus in the portal vein. Regarding the adrenal nodule, this does not appear to be functioning. The plasma dopamine was elevated, but the urine dopamine was negative. This is likely not functioning. I would recommend continued observation. I will see him back after the MRI. He is agreeable to this plan. All his questions were answered.

## 2023-09-21 NOTE — PROGRESS NOTES
Surgical Oncology Follow Up       [facility] [place] [address]  [address]    Radha Ordaz  1970  [phone]  [facility] [place]  [facility] [place] [address]  [address]  [phone]    Diagnoses and all orders for this visit:    Adrenal nodule [facility])    Pancreatic lesion        Chief Complaint   Patient presents with   • Follow-up       Return in about 3 months (around 12/21/2023) for Office Visit. Oncology History    No history exists. 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 He had an MRI on August 23, 2023. There were several areas in the liver corresponding to areas of hyperenhancement. It was unclear if these were metastases or shunts. The lesion seen on CTA was not seen well on MRI. Adrenal lesion was felt to be a cyst.  A kidney cyst was seen as well. There was also concern for filling defects in the portal vein. I personally reviewed the films. The patient is on both antiplatelet and anticoagulation with Plavix and Coumadin. He is feeling well. His functional adrenal work-up was negative and his tumor markers were negative. Review of Systems  Complete ROS Surg Onc:   Complete ROS Surg Onc:   Constitutional: The patient denies new or recent history of general fatigue, no recent weight loss, no change in appetite. Eyes: No complaints of visual problems, no scleral icterus. ENT: no complaints of ear pain, no hoarseness, no difficulty swallowing,  no tinnitus and no new masses in head, oral cavity, or neck.    Cardiovascular: No complaints of chest pain, no palpitations, no ankle edema. Respiratory: No complaints of shortness of breath, no cough. Gastrointestinal: No complaints of jaundice, no bloody stools, no pale stools. Genitourinary: No complaints of dysuria, no hematuria, no nocturia, no frequent urination, no urethral discharge. Musculoskeletal: No complaints of weakness, paralysis, joint stiffness or arthralgias. Integumentary: No complaints of rash, no new lesions. Neurological: No complaints of convulsions, no seizures, no dizziness. Hematologic/Lymphatic: No complaints of easy bruising. Endocrine:  No hot or cold intolerance. No polydipsia, polyphagia, or polyuria. Allergy/immunology:  No environmental allergies. No food allergies. Not immunocompromised. Skin:  No pallor or rash. No wound. Patient Active Problem List   Diagnosis   • Abnormal chest CT   • Tobacco dependence   • Essential hypertension   • Annual physical exam   • Prediabetes   • Hyperlipidemia, mixed   • Pain in right leg   • Arterial occlusion   • Acute blood loss anemia   • Insomnia   • Pancreatic lesion   • Adrenal nodule [facility])     Past Medical History:   Diagnosis Date   • Hypertension    • Lung nodule    • Prediabetes      Past Surgical History:   Procedure Laterality Date   • IR LOWER EXTREMITY ANGIOGRAM  7/29/2023   • THROMBECTOMY W/ EMBOLECTOMY Right 7/29/2023    Procedure: RIGHT LEG BALLOON ANGIOPLASTY, STENT, FASCIOTOMY;  Surgeon: Mylene Sanon MD;  Location: [facility];  Service: Vascular   • WOUND DEBRIDEMENT Right 8/3/2023    Procedure: DEBRIDEMENT LOWER EXTREMITY ([address] [place] [address] Street OUT) R LEG FASCIOTOMY WOUND WASHOUT, DEBRIDEMENT, AND POSSIBLE CLOSURE VS VAC PLACEMENT;  Surgeon: Thom Rooney DO;  Location: [facility];  Service: Vascular     No family history on file.   Social History     Socioeconomic History   • Marital status: /Civil Union     Spouse name: Not on file   • Number of children: Not on file   • Years of education: Not on file   • Highest education level: Not on file   Occupational History   • Not on file   Tobacco Use   • Smoking status: Former     Packs/day: 0.50     Types: Cigarettes     Start date: 80     Quit date: 2023     Years since quittin.1     Passive exposure: Never   • Smokeless tobacco: Never   Vaping Use   • Vaping Use: Never used   Substance and Sexual Activity   • Alcohol use: Yes     Alcohol/week: 14.0 standard drinks of alcohol     Types: 14 Cans of beer per week   • Drug use: No   • Sexual activity: Not on file   Other Topics Concern   • Not on file   Social History Narrative   • Not on file     Social Determinants of Health     Financial Resource Strain: Not on file   Food Insecurity: No Food Insecurity (2023)    Hunger Vital Sign    • Worried About Running Out of Food in the Last Year: Never true    • Ran Out of Food in the Last Year: Never true   Transportation Needs: No Transportation Needs (2023)    PRAPARE - Transportation    • Lack of Transportation (Medical): No    • Lack of Transportation (Non-Medical):  No   Physical Activity: Not on file   Stress: Not on file   Social Connections: Not on file   Intimate Partner Violence: Not on file   Housing Stability: Low Risk  (2023)    Housing Stability Vital Sign    • Unable to Pay for Housing in the Last Year: No    • Number of Places Lived in the Last Year: 1    • Unstable Housing in the Last Year: No       Current Outpatient Medications:   •  acetaminophen (TYLENOL) 325 mg tablet, Take 2 tablets (650 mg total) by mouth every 6 (six) hours as needed for mild pain, Disp: , Rfl:   •  amLODIPine (NORVASC) 5 mg tablet, Take 1 tablet (5 mg total) by mouth daily, Disp: 90 tablet, Rfl: 3  •  atorvastatin (LIPITOR) 40 mg tablet, Take 1 tablet (40 mg total) by mouth daily after dinner, Disp: 30 tablet, Rfl: 0  •  clopidogrel (PLAVIX) 75 mg tablet, Take 1 tablet (75 mg total) by mouth daily, Disp: 90 tablet, Rfl: 1  •  DULoxetine (CYMBALTA) 60 mg delayed release capsule, Take 1 capsule (60 mg total) by mouth daily, Disp: 30 capsule, Rfl: 0  •  gabapentin (NEURONTIN) 300 mg capsule, Take 1 capsule (300 mg total) by mouth 3 (three) times a day, Disp: 90 capsule, Rfl: 0  •  methocarbamol (ROBAXIN) 500 mg tablet, Take 1 tablet (500 mg total) by mouth 4 (four) times a day, Disp: 120 tablet, Rfl: 0  •  povidone-iodine (BETADINE) 10 % external solution, Apply 1 Application topically as needed for wound care, Disp: 118 mL, Rfl: 1  •  warfarin (Coumadin) 5 mg tablet, Take 5mg daily,or as directed based on INR results, Disp: 90 tablet, Rfl: 0  No Known Allergies  Vitals:    09/21/23 1150   BP: 124/72   Pulse: 86   Resp: 16   Temp: 97.7 °F (36.5 °C)   SpO2: 98%       Physical Exam  Constitutional: General appearance: The Patient is well-developed and well-nourished who appears the stated age in no acute distress. Patient is pleasant and talkative. HEENT:  Normocephalic. Sclerae are anicteric. Mucous membranes are moist. Neck is supple without adenopathy. No JVD. Chest: The lungs are clear to auscultation. Cardiac: Heart is regular rate. Abdomen: Abdomen is soft, non-tender, non-distended and without masses. Extremities: There is no clubbing or cyanosis. There is no edema. Symmetric. Neuro: Grossly nonfocal. Gait is normal.     Lymphatic: No evidence of cervical adenopathy bilaterally. No evidence of axillary adenopathy bilaterally. Skin: Warm, anicteric. Psych:  Patient is pleasant and talkative.   Breasts:        Pathology:  [unfilled]    Labs:  Component Ref Range & Units 8/24/23  8:57 AM   Norepinephrine Plasma 0 - 874 pg/mL 765    Epinephrine Plasma 0 - 62 pg/mL 18    Dopamine Plasma 0 - 48 pg/mL 60 High        Ref Range & Units 8/24/23  8:57 AM   Cortisol - AM 6.7 - 22.6 ug/dL 0.9 Low        Ref Range & Units 8/24/23  8:57 AM   Normetanephrine, Free 0.0 - 244.0 pg/mL 63.3    Metanephrine, Free 0.0 - 88.0 pg/mL 10.2       Ref Range & Units 8/25/23  1:44 PM   Epinephrine, 24H Ur 0 - 20 ug/24 hr 2    Norepinephrine, 24H Ur 0 - 135 ug/24 hr 50    Dopamine , 24H Ur 0 - 510 ug/24 hr 232    Epinephrine, Rand Ur Undefined ug/L 1    Comment: Total Volume: 2000 mL   Norepinephrine, Rand Ur Undefined ug/L 25    Dopamine, Rand Ur Undefined ug/L 116           Component Ref Range & Units 8/24/23  8:57 AM   Renin 0.167 - 5.380 ng/mL/hr 1.981          Ref Range & Units 8/28/23 10:04 AM   Chromogranin A 0.0 - 101.8 ng/mL 39.2      Ref Range & Units 8/28/23 10:04 AM   CA 19-9 0 - 35 U/mL 6              Imaging  MRI abdomen w wo contrast and mrcp    Result Date: 8/28/2023  Narrative: MRI OF THE ABDOMEN WITH AND WITHOUT CONTRAST WITH MRCP INDICATION: 57-year-old male with PNET suspected by CTA with concern for liver metastases. Also requiring further evaluation of adrenal nodule and renal lesion. COMPARISON: CTAs dated 7/29/2023 and 7/31/2023. TECHNIQUE:  Multiplanar/multisequence MRI of the abdomen with 3D MRCP was performed before and after administration of contrast. IV Contrast:  7 mL of Gadobutrol injection (SINGLE-DOSE) FINDINGS: LOWER CHEST:   Unremarkable. LIVER: Normal size and contour. No steatosis or evidence for iron overload. Background T2 signal is normal. --7 mm T2 hyperintense, mildly DWI hyperintense, rounded focus in segment IVB (4/107). Early complete ring hyperenhancement (12/171) with centripetal fill on delayed. --Subtle DWI hyperintensity in segment V (4/78) corresponding to focus of hyperenhancement seen on 7/29/2023 (prior study 3/95). No associated abnormal enhancement on the current study. --6 mm focus of early hyperenhancement in segment II (12/36). No associated T2/DWI signal changes. Findings inconspicuous on delayed phases. This may represent a small arterial portal shunt or another enhancing lesion. --Other  segment 6 subcentimeter hyperenhancing foci seen on recent CTA show no definitive correlate on the current study.  Fibrillar linear signal abnormality in the right and left portal veins may represent sequelae of of chronic web/clot. This persists from previous CTAs suggesting that it is real rather than mixing artifact. No occlusive clot. BILE DUCTS:  No intrahepatic or extrahepatic bile duct dilation. Common bile duct is normal in caliber. No choledocholithiasis, biliary stricture or suspicious mass. GALLBLADDER:  Normal. PANCREAS: Spatial resolution of MRI is lower than CT. The focus of arterial hyperenhancement seen on both recent CTAs is less conspicuous on MRI due to small size. Previous CTA appearance suggesting neuroendocrine tumor. No other solid masses or pancreatic ductal dilatation. ADRENAL GLANDS: 11 mm right adrenal cyst, likely sequela of prior hemorrhage due to infection or trauma. No suspicious adrenal masses on either side. SPLEEN:  Normal. KIDNEYS/PROXIMAL URETERS: Complicated right upper pole renal cystic lesion measuring 2.2 cm x 2.0 cm x 2.4 cm showing thin T2 hypointense hemosiderin rim. There is T1 intrinsically hyperintense material in a somewhat nodular configuration along its lateral wall. Subtraction image seems to suggest some of this tissue enhances measuring up to 3 mm in thickness (28991/67). This was questioned as misregistration artifact, but the tissue appears to enhance on prior CTA after contrast, also. There are at  least 2 septations thinner septations evident, also. Other bilateral renal lesions are Bosniak II subcentimeter cysts. No perinephric collections. Renal collecting systems are decompressed. BOWEL:   No dilated loops of bowel. PERITONEUM/RETROPERITONEUM:  No ascites. LYMPH NODES:  No abdominal lymphadenopathy. VASCULAR STRUCTURES: Thin filling defects in the portal veins, as discussed above--also evident on prior CTAs in retrospect. Persistent speaks against mixing artifact. No occlusive thrombus. Abdominal aorta and branch vessels are normal in caliber.  Single renal artery and vein on the right side. ABDOMINAL WALL:  Unremarkable. OSSEOUS STRUCTURES:  No suspicious osseous lesion. Impression: 1. 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 Endoscopic ultrasound could be considered for further evaluation, though location in the far tail may make characterization difficult. 2.  7 mm T2 hyperintense, enhancing lesion in segment IVB. Even though marked T2 hyperintensity and centripetal fill of contrast are present, thin complete ring enhancement on early phase is atypical for hemangioma. 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 Additionally, there is a DWI-only focus in segment VI and focus of hyperenhancement in segment II that are also potentially concerning for metastases. Findings warrant surveillance MRI in 3 months. 3.  Complicated 2.4 cm right renal cystic lesion with thin enhancing tissue along its lateral margin and 2 thin septations. Bosniak v2019 Category IIF. The large majority of Bosniak IIF lesions are benign. When malignant, nearly all are indolent. Though typically warranting 6 month follow-up for the first 12 months, in this case the finding will be reevaluated on the above-recommended 3-month follow-up. 4.  Right adrenal cyst suggests prior hemorrhage related to old infection or trauma. 5.  Possible chronic nonocclusive thrombus or web in the portal venous tree. Recommend CT of the abdomen in portal venous phase to confirm or refute the finding. SUMMARY OF RECOMMENDATIONS: 6.  Consider endoscopic ultrasound. 7.  CT of the abdomen after IV contrast in portal venous phase at the current time. 8.  Surveillance multiphase MRI without and with contrast of the abdomen in 3 months. Note: The study was marked in EPIC for immediate notification. Endoscopic and imaging follow-up reminder notifications were scheduled in the electronic medical record.  Workstation performed: [id number]     I personally reviewed and interpreted the above laboratory and imaging data. Discussion/Summary: 50-year-old male with a 1.5 cm right adrenal nodule and an 8 mm pancreatic lesion. We had a long discussion regarding these lesions. The pancreatic lesion is likely a neuroendocrine tumor. This was not seen very well on the MRI. Endoscopic ultrasound could be performed, but I doubt this is adenocarcinoma. The lesion is 8 mm in size. His tumor markers are all normal.  I discussed if this was neuroendocrine tumor it would be highly unusual that there would be metastasis at that size. He is already set up for repeat MRI within the next 3 months. I think this is reasonable. If there are any changes on this MRI, I would proceed with intervention. Because he is already on anticoagulation and antiplatelets, I do not think anything further needs to be done regarding the questionable thrombus in the portal vein. Regarding the adrenal nodule, this does not appear to be functioning. The plasma dopamine was elevated, but the urine dopamine was negative. This is likely not functioning. I would recommend continued observation. I will see him back after the MRI. He is agreeable to this plan. All his questions were answered.

## 2023-09-25 RX ORDER — DULOXETIN HYDROCHLORIDE 60 MG/1
60 CAPSULE, DELAYED RELEASE ORAL DAILY
Qty: 30 CAPSULE | Refills: 0 | Status: SHIPPED | OUTPATIENT
Start: 2023-09-25

## 2023-09-26 ENCOUNTER — TELEPHONE (OUTPATIENT)
Dept: VASCULAR SURGERY | Facility: CLINIC | Age: 53
End: 2023-09-26

## 2023-10-03 ENCOUNTER — APPOINTMENT (OUTPATIENT)
Dept: LAB | Facility: HOSPITAL | Age: 53
End: 2023-10-03
Attending: SURGERY
Payer: COMMERCIAL

## 2023-10-03 ENCOUNTER — OFFICE VISIT (OUTPATIENT)
Dept: VASCULAR SURGERY | Facility: CLINIC | Age: 53
End: 2023-10-03

## 2023-10-03 ENCOUNTER — ANTICOAG VISIT (OUTPATIENT)
Dept: VASCULAR SURGERY | Facility: CLINIC | Age: 53
End: 2023-10-03

## 2023-10-03 VITALS
HEART RATE: 68 BPM | WEIGHT: 161 LBS | BODY MASS INDEX: 23.85 KG/M2 | HEIGHT: 69 IN | DIASTOLIC BLOOD PRESSURE: 76 MMHG | SYSTOLIC BLOOD PRESSURE: 126 MMHG

## 2023-10-03 DIAGNOSIS — I74.4 EMBOLISM AND THROMBOSIS OF ARTERIES OF EXTREMITIES (HCC): Primary | ICD-10-CM

## 2023-10-03 DIAGNOSIS — M79.604 PAIN IN RIGHT LEG: ICD-10-CM

## 2023-10-03 PROCEDURE — 99024 POSTOP FOLLOW-UP VISIT: CPT | Performed by: SURGERY

## 2023-10-03 NOTE — LETTER
October 3, 2023     Patient: Gage Cadet  Date of Visit: 10/3/2023      To Whom it May Concern:    Gage Cadet is under my professional care. Melodie Simmons was seen in my office on 10/3/2023. Melodie Simmons may return to work on Nov 3. 23 once his right leg incision is healed . If you have any questions or concerns, please don't hesitate to call.          Sincerely,          Walker Galo MD        CC: No Recipients

## 2023-10-03 NOTE — PATIENT INSTRUCTIONS
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 2+ right DP pulse. Swelling has imrpoved. Still have neuropathic pain in right foot. Good response with addition of Cymbalta. Continue warfarin and plavix, for 6 months and we will decide on long term plan. Doppler in 3 months.

## 2023-10-03 NOTE — PROGRESS NOTES
S/w pt and wife, confirmed he was taking as directed, they deny that he missed any doses. Per protocol.

## 2023-10-03 NOTE — ASSESSMENT & PLAN NOTE
RIGHT leg acute ischemia due to occlusion of the distal SFA and popliteal artery, S/p R SFA cutdown, balloon angioplasty & stenting 7-29-23  as well as R leg fasciotomy with recent closure on 8/3/23     Right leg fasciotomy wound is healing well.     2+ right DP pulse.     Swelling has imrpoved. Still have neuropathic pain in right foot. Good response with addition of Cymbalta.     Continue warfarin and plavix, Will decide about long term in 6 months.     Doppler in 3 months.

## 2023-10-03 NOTE — PROGRESS NOTES
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     2+ right DP pulse.     Swelling has imrpoved. Still have neuropathic pain in right foot. Good response with addition of Cymbalta.     Continue warfarin and plavix, Will decide about long term in 6 months.     Doppler in 3 months. 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 male. Patient presents for eval of RLE incision s/p RLE intervention 7/29/23 & fasciotomy 8/3/23. HPI  Patient is having good response to the use of Cymbalta. He is able to walk and do his leg exercises much better. No drainage from the wound. The following portions of the patient's history were reviewed and updated as appropriate: allergies, current medications, past family history, past medical history, past social history, past surgical history and problem list.    Review of Systems   Constitutional: Negative. HENT: Negative. Eyes: Negative. Respiratory: Negative. Cardiovascular: Negative. Gastrointestinal: Negative. Endocrine: Negative. Genitourinary: Negative. Musculoskeletal: Negative. Skin: Negative. Allergic/Immunologic: Negative. Neurological: Negative. Hematological: Negative. Psychiatric/Behavioral: Negative.      I have reviewed the review of systems as entered and made appropriate changes as necessary    Objective:      /76 (BP Location: Left arm, Patient Position: Sitting, Cuff Size: Standard)   Pulse 68   Ht 5' 9" (1.753 m)   Wt 73 kg (161 lb)   BMI 23.78 kg/m²          Physical Exam  Vitals and nursing note reviewed. Constitutional:       Appearance: Normal appearance. Cardiovascular:      Rate and Rhythm: Normal rate and regular rhythm. Comments: 2+ right dorsalis pedis pulse  Musculoskeletal:      Right lower leg: Edema present. Comments: Right anterior fasciotomy incision has healed well. There is a dry scab overlying the incision line. No drainage. Skin:     Capillary Refill: Capillary refill takes less than 2 seconds. Neurological:      Mental Status: He is alert.    Psychiatric:         Mood and Affect: Mood normal.         Behavior: Behavior normal.

## 2023-10-17 ENCOUNTER — TELEPHONE (OUTPATIENT)
Dept: VASCULAR SURGERY | Facility: CLINIC | Age: 53
End: 2023-10-17

## 2023-10-17 DIAGNOSIS — M62.831 MUSCLE SPASM OF CALF: ICD-10-CM

## 2023-10-17 DIAGNOSIS — M79.604 PAIN IN RIGHT LEG: ICD-10-CM

## 2023-10-17 RX ORDER — GABAPENTIN 300 MG/1
300 CAPSULE ORAL 3 TIMES DAILY
Qty: 90 CAPSULE | Refills: 2 | Status: SHIPPED | OUTPATIENT
Start: 2023-10-17

## 2023-10-17 RX ORDER — METHOCARBAMOL 500 MG/1
500 TABLET, FILM COATED ORAL 4 TIMES DAILY
Qty: 120 TABLET | Refills: 0 | Status: SHIPPED | OUTPATIENT
Start: 2023-10-17

## 2023-10-17 NOTE — TELEPHONE ENCOUNTER
Wife called requesting refill, she states pt takes TID as directed. Pt recently seen by Dr. Maria A Loya 10/3/23.

## 2023-10-20 ENCOUNTER — APPOINTMENT (OUTPATIENT)
Dept: LAB | Facility: HOSPITAL | Age: 53
End: 2023-10-20
Payer: COMMERCIAL

## 2023-10-20 ENCOUNTER — ANTICOAG VISIT (OUTPATIENT)
Dept: VASCULAR SURGERY | Facility: CLINIC | Age: 53
End: 2023-10-20

## 2023-10-20 DIAGNOSIS — I74.4 EMBOLISM AND THROMBOSIS OF ARTERIES OF EXTREMITIES (HCC): Primary | ICD-10-CM

## 2023-10-20 DIAGNOSIS — M79.604 PAIN IN RIGHT LEG: ICD-10-CM

## 2023-10-20 DIAGNOSIS — I70.90 ARTERIAL OCCLUSION: ICD-10-CM

## 2023-10-20 RX ORDER — ATORVASTATIN CALCIUM 40 MG/1
40 TABLET, FILM COATED ORAL
Qty: 30 TABLET | Refills: 0 | Status: SHIPPED | OUTPATIENT
Start: 2023-10-20

## 2023-10-20 RX ORDER — WARFARIN SODIUM 5 MG/1
TABLET ORAL
Qty: 132 TABLET | Refills: 1 | Status: SHIPPED | OUTPATIENT
Start: 2023-10-20

## 2023-10-24 ENCOUNTER — TELEPHONE (OUTPATIENT)
Dept: VASCULAR SURGERY | Facility: CLINIC | Age: 53
End: 2023-10-24

## 2023-10-24 NOTE — TELEPHONE ENCOUNTER
NATALYA Patterson  [facility] Clinical[facility] now (1:59 PM)     SC  I agree with advice given by triage staff. Continue use of OTC saline nasal spray prn to keep nasal passages moist. Please have the pt contact the office for any acute bleeding. Thank you.

## 2023-10-24 NOTE — TELEPHONE ENCOUNTER
Pt's wife called ? If we are aware pt is taking two blood thinners, warfarin and plavix. Advised yes per Dr. Jack Blanc ov note from 8/29/23 "Continue warfarin and plavix for now. Will decide about long term in 3-6 months. ".  Pt is scheduled for LEAD 11/27/23 and OV w/ Dr. Mack Bidding 12/19/23. Per wife yesterday whenever pt blew his nose there was dark red blood mixed in w/ mucous, he did not have any active bleeding. ? If he is having issue today, per pt he has not needed to blow his nose today. He denies any other bleeding. Advised pt to use OTC saline nasal spray daily to keep nasal passages moist.  Will route concerns to our triage provider for any additional recommendations.      (FYI last INR 10/20/23 was 1.86, he is repeating 10/27, he had been taking warfarin 7.5mg daily, per protocol based on results he was instructed to continue except on Fridays take 10mg)

## 2023-10-26 DIAGNOSIS — M79.604 PAIN IN RIGHT LEG: ICD-10-CM

## 2023-10-26 DIAGNOSIS — M79.2 NEUROPATHIC PAIN OF RIGHT FOOT: ICD-10-CM

## 2023-10-27 RX ORDER — DULOXETIN HYDROCHLORIDE 60 MG/1
60 CAPSULE, DELAYED RELEASE ORAL DAILY
Qty: 30 CAPSULE | Refills: 0 | Status: SHIPPED | OUTPATIENT
Start: 2023-10-27

## 2023-10-30 ENCOUNTER — TELEPHONE (OUTPATIENT)
Dept: VASCULAR SURGERY | Facility: CLINIC | Age: 53
End: 2023-10-30

## 2023-10-30 NOTE — TELEPHONE ENCOUNTER
I would have him monitor the incision for now since there are no open areas. He should continue to cleanse wound daily with soap and water and cover with a dry bandage. He should call the office with any changes. If able, I recommend that he avoid strenuous activity or heavy lifting until the incision is closed or have someone assist him with heavy lifting in the interim.

## 2023-10-30 NOTE — TELEPHONE ENCOUNTER
Patient has to do his own lifting at work , he is asking if we can extend his leave for 2 more weeks until 11/2723. Antionette Hernandez send to triage to approve.

## 2023-10-30 NOTE — TELEPHONE ENCOUNTER
Spoke with patient, he does still have leg swelling, but no lump, redness, or warmth at area. He does have some incisional pain . 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 Will reply to triage.

## 2023-10-30 NOTE — TELEPHONE ENCOUNTER
Patients wife called ,  gave patient a letter to return to work on 11/3/23 after his incision is healed. Per wife the distal part of the fasciotomy incision is scabbed over and today , started draining a small amount of yellowish fluid. Patient denies any fever or chills, and drainage is on a small amount at this time, but wife is concerned if he should not return to work yet. Sent to vascular triage.

## 2023-10-30 NOTE — TELEPHONE ENCOUNTER
Is there any other incision concerns or leg complaints? ie. Redness, warmth, tenderness, lumps, swelling, leg pain    What does he do for a living? Does he do any heavy lifting? If so, how much?

## 2023-11-02 ENCOUNTER — TELEPHONE (OUTPATIENT)
Dept: VASCULAR SURGERY | Facility: CLINIC | Age: 53
End: 2023-11-02

## 2023-11-06 ENCOUNTER — TELEPHONE (OUTPATIENT)
Dept: HEMATOLOGY ONCOLOGY | Facility: CLINIC | Age: 53
End: 2023-11-06

## 2023-11-13 DIAGNOSIS — M79.604 PAIN IN RIGHT LEG: ICD-10-CM

## 2023-11-13 RX ORDER — ATORVASTATIN CALCIUM 40 MG/1
TABLET, FILM COATED ORAL
Qty: 90 TABLET | Refills: 1 | Status: SHIPPED | OUTPATIENT
Start: 2023-11-13

## 2023-11-28 ENCOUNTER — TELEPHONE (OUTPATIENT)
Dept: VASCULAR SURGERY | Facility: CLINIC | Age: 53
End: 2023-11-28

## 2023-11-28 NOTE — TELEPHONE ENCOUNTER
----- Message from Corona Sorenson RN sent at 11/28/2023  5:25 PM EST -----  Regarding: FW: See photo  Contact: [phone]    ----- Message -----  From: Trino Moya  Sent: 11/28/2023   5:09 PM EST  To: [facility]  Subject: See photo                                        See photo, the other side

## 2023-11-28 NOTE — TELEPHONE ENCOUNTER
Photos reviewed by Pancho Mcintyre PA-C, per tiger text message "Looks like blood. He should probably go to the ER or contact PCP". Called pt and s/w wife and informed of same. She is going to reach out to pcp.

## 2023-11-29 ENCOUNTER — TELEPHONE (OUTPATIENT)
Dept: FAMILY MEDICINE CLINIC | Facility: CLINIC | Age: 53
End: 2023-11-29

## 2023-12-07 ENCOUNTER — TELEPHONE (OUTPATIENT)
Dept: VASCULAR SURGERY | Facility: CLINIC | Age: 53
End: 2023-12-07

## 2023-12-12 ENCOUNTER — ANTICOAG VISIT (OUTPATIENT)
Dept: VASCULAR SURGERY | Facility: CLINIC | Age: 53
End: 2023-12-12

## 2023-12-12 ENCOUNTER — HOSPITAL ENCOUNTER (OUTPATIENT)
Dept: MRI IMAGING | Facility: HOSPITAL | Age: 53
Discharge: HOME | End: 2023-12-12
Attending: INTERNAL MEDICINE
Payer: COMMERCIAL

## 2023-12-12 ENCOUNTER — APPOINTMENT (OUTPATIENT)
Dept: LAB | Facility: HOSPITAL | Age: 53
End: 2023-12-12
Payer: COMMERCIAL

## 2023-12-12 DIAGNOSIS — K76.9 LIVER LESION: ICD-10-CM

## 2023-12-12 DIAGNOSIS — I74.4 EMBOLISM AND THROMBOSIS OF ARTERIES OF EXTREMITIES (HCC): Primary | ICD-10-CM

## 2023-12-12 DIAGNOSIS — N28.1 RENAL CYST: ICD-10-CM

## 2023-12-12 DIAGNOSIS — K86.9 PANCREATIC LESION: ICD-10-CM

## 2023-12-12 PROCEDURE — A9585 GADOBUTROL INJECTION: HCPCS | Performed by: STUDENT IN AN ORGANIZED HEALTH CARE EDUCATION/TRAINING PROGRAM

## 2023-12-12 PROCEDURE — G1004 CDSM NDSC: HCPCS

## 2023-12-12 PROCEDURE — 74183 MRI ABD W/O CNTR FLWD CNTR: CPT

## 2023-12-12 RX ORDER — GADOBUTROL 604.72 MG/ML
7 INJECTION INTRAVENOUS
Status: COMPLETED | OUTPATIENT
Start: 2023-12-12 | End: 2023-12-12

## 2023-12-12 RX ADMIN — GADOBUTROL 7 ML: 604.72 INJECTION INTRAVENOUS at 07:58

## 2023-12-12 NOTE — PROGRESS NOTES
s/w wife, she states pt was taking 7.5mg daily, he has not been taking 10mg on Fridays. Per protocol.

## 2023-12-13 ENCOUNTER — TELEPHONE (OUTPATIENT)
Dept: VASCULAR SURGERY | Facility: CLINIC | Age: 53
End: 2023-12-13

## 2023-12-13 NOTE — TELEPHONE ENCOUNTER
Patient's spouse, Divya Campoverde, called back to confirm that she has received the message regarding 12/18/23 doppler and 1/2/24 appointment with Dr Uvaldo Michelle

## 2023-12-13 NOTE — TELEPHONE ENCOUNTER
Patient needs SAMARIA and follow up OV scheduled. Per Elizabeth Frias there is an availability today at 2:30 at [address] [facility] and Monday 12/18 at 8 am in [place]. I left a message for his spouse, [facility], to call us back ASAP.

## 2023-12-13 NOTE — TELEPHONE ENCOUNTER
Called patient's spouse, Shannon Loomis, per her request to call her number at [phone] to confirm [address] [place] [address] appointment for [address] on 12/18/23 8 am at [address] [place] [address]. Also,his follow up appointment on January 2, 11:15 am with Dr Chula Cantu at the [facility] office.

## 2023-12-18 ENCOUNTER — HOSPITAL ENCOUNTER (OUTPATIENT)
Dept: VASCULAR ULTRASOUND | Facility: HOSPITAL | Age: 53
Discharge: HOME | End: 2023-12-18
Attending: SURGERY
Payer: COMMERCIAL

## 2023-12-18 DIAGNOSIS — I74.4 EMBOLISM AND THROMBOSIS OF ARTERIES OF EXTREMITIES (HCC): ICD-10-CM

## 2023-12-18 PROCEDURE — 93922 UPR/L XTREMITY ART 2 LEVELS: CPT | Performed by: SURGERY

## 2023-12-18 PROCEDURE — 93926 LOWER EXTREMITY STUDY: CPT

## 2023-12-18 PROCEDURE — 93926 LOWER EXTREMITY STUDY: CPT | Performed by: SURGERY

## 2023-12-19 ENCOUNTER — OFFICE VISIT (OUTPATIENT)
Dept: HEMATOLOGY ONCOLOGY | Facility: CLINIC | Age: 53
End: 2023-12-19
Payer: COMMERCIAL

## 2023-12-19 ENCOUNTER — TELEPHONE (OUTPATIENT)
Dept: CARDIAC SURGERY | Facility: CLINIC | Age: 53
End: 2023-12-19

## 2023-12-19 VITALS
DIASTOLIC BLOOD PRESSURE: 86 MMHG | OXYGEN SATURATION: 99 % | BODY MASS INDEX: 23.85 KG/M2 | WEIGHT: 161 LBS | TEMPERATURE: 98.1 F | HEIGHT: 69 IN | HEART RATE: 82 BPM | RESPIRATION RATE: 16 BRPM | SYSTOLIC BLOOD PRESSURE: 122 MMHG

## 2023-12-19 DIAGNOSIS — K86.9 PANCREATIC LESION: ICD-10-CM

## 2023-12-19 DIAGNOSIS — N28.1 RENAL CYST: ICD-10-CM

## 2023-12-19 DIAGNOSIS — Z87.891 SMOKING HISTORY: Primary | ICD-10-CM

## 2023-12-19 PROCEDURE — 99214 OFFICE O/P EST MOD 30 MIN: CPT | Performed by: INTERNAL MEDICINE

## 2023-12-19 NOTE — PROGRESS NOTES
[facility] HEMATOLOGY ONCOL[facility] SPECIAL[facility] [place]  [address] [place] [id number]    Hector Mac  1970      PRIMARY HEMATOLOGIC/ONCOLOGIC DIAGNOSIS:  1. Chronic thrombus in distal SFA  2. 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 Right adrenal nodule  4. Multiple subcentimeter foci of hyperenhancement also seen in the liver which are compatible with vascular shunting, however metastatic lesions would be a consideration in the context of suspected pancreatic neoplasm.    SECONDARY DIAGNOSES:  1. Smoker. Quit smoking 8/2023    HISTORY OF PRESENT ILLNESS:  Hector Mac is a 54yo male who presents for management of arterial thrombosis. On 7/17/23 the patient was working when his RLE became numb from his knee down. He was diagnosed with sciatica at [facility]. He then followed up with his PCP who initiated the work-up for PVD. While undergoing work-up the patient presented to the ED with RLE pain on 7/28/23. Vascular lower limb arterial duplex was c/w acute occlusion of the mid and distal superficial femoral artery. There was an occlusion of the distal anterior tibial artery. Abdominal CTA w/ runoff c/w focal mid to distal right SFA occlusion with short interval reconstitution. A 2.7 mm arterially enhancing lesion within the tail of the pancreas was noted. Cystic lesion within the right kidney measuring up to 2.7 cm, demonstrating mural calcifications. Right adrenal nodule measuring up to 1.5 cm. The patient was evaluated by surgical oncology and MRI abdomen was ordered-- report not available yet. CTA 7/31/23 showed numerous groundglass opacities throughout the upper lobes concerning for atypical infectious process. He denies any infectious symptoms. Redemonstration of a hyperenhancing 7 mm lesion in the pancreatic tail suggestive of a pancreatic neuroendocrine tumor. There are multiple subcentimeter foci of hyperenhancement also seen in  the liver which are compatible with vascular shunting, however metastatic lesions would be a consideration in the context of suspected pancreatic neoplasm. Right adrenal nodule measuring up to 1.5 cm. Its imaging features were diagnostic of benign adrenal adenoma, and did not need further follow-up or work-up.  The patient is status post right SFA cutdown, balloon angioplasty, stenting, right leg fasciotomy on 7/29/23. He is s/p additional washout and fasciotomy closure on 8/3/23. He follows with vascular surgery. He is on Coumadin.   The patient used to smoke 1ppd. He quit smoking 8/2023 after his hospital admission.     INTERIM HISTORY:  The patient presents for a follow-up. He has no new complaints.    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) 7/28/2023    Hypertension     Lung nodule     Prediabetes      Past Surgical History:   Procedure Laterality Date    IR LOWER EXTREMITY ANGIOGRAM  7/29/2023    THROMBECTOMY W/ EMBOLECTOMY Right 7/29/2023    Procedure: RIGHT LEG BALLOON ANGIOPLASTY, STENT, FASCIOTOMY;  Surgeon: Eli Berry MD;  Location: [facility];  Service: Vascular    WOUND DEBRIDEMENT Right 8/3/2023    Procedure: DEBRIDEMENT LOWER EXTREMITY (WASH OUT) R LEG FASCIOTOMY WOUND WASHOUT, DEBRIDEMENT, AND POSSIBLE CLOSURE VS VAC PLACEMENT;  Surgeon: Eliot Berkowitz DO;  Location: [facility];  Service: Vascular     No family history on file.  Social History     Socioeconomic History    Marital status: /Civil Union     Spouse name: Not on file     Number of children: Not on file    Years of education: Not on file    Highest education level: Not on file   Occupational History    Not on file   Tobacco Use    Smoking status: Former     Current packs/day: 0.00     Average packs/day: 0.5 packs/day for 35.6 years (17.8 ttl pk-yrs)     Types: Cigarettes     Start date:      Quit date: 2023     Years since quittin.3     Passive exposure: Never    Smokeless tobacco: Never   Vaping Use    Vaping status: Never Used   Substance and Sexual Activity    Alcohol use: Yes     Alcohol/week: 14.0 standard drinks of alcohol     Types: 14 Cans of beer per week    Drug use: No    Sexual activity: Not on file   Other Topics Concern    Not on file   Social History Narrative    Not on file     Social Determinants of Health     Financial Resource Strain: Not on file   Food Insecurity: No Food Insecurity (2023)    Hunger Vital Sign     Worried About Running Out of Food in the Last Year: Never true     Ran Out of Food in the Last Year: Never true   Transportation Needs: No Transportation Needs (2023)    PRAPARE - Transportation     Lack of Transportation (Medical): No     Lack of Transportation (Non-Medical): No   Physical Activity: Not on file   Stress: Not on file   Social Connections: Not on file   Intimate Partner Violence: Not on file   Housing Stability: Low Risk  (2023)    Housing Stability Vital Sign     Unable to Pay for Housing in the Last Year: No     Number of Places Lived in the Last Year: 1     Unstable Housing in the Last Year: No       Current Outpatient Medications:     acetaminophen (TYLENOL) 325 mg tablet, Take 2 tablets (650 mg total) by mouth every 6 (six) hours as needed for mild pain, Disp: , Rfl:     amLODIPine (NORVASC) 5 mg tablet, Take 1 tablet (5 mg total) by mouth daily, Disp: 90 tablet, Rfl: 3    atorvastatin (LIPITOR) 40 mg tablet, take 1 tablet by mouth once daily after dinner, Disp: 90 tablet, Rfl: 1    clopidogrel (PLAVIX) 75  mg tablet, Take 1 tablet (75 mg total) by mouth daily, Disp: 90 tablet, Rfl: 1    DULoxetine (CYMBALTA) 60 mg delayed release capsule, take 1 capsule by mouth once daily, Disp: 30 capsule, Rfl: 0    gabapentin (NEURONTIN) 300 mg capsule, Take 1 capsule (300 mg total) by mouth 3 (three) times a day, Disp: 90 capsule, Rfl: 2    methocarbamol (ROBAXIN) 500 mg tablet, Take 1 tablet (500 mg total) by mouth 4 (four) times a day, Disp: 120 tablet, Rfl: 0    povidone-iodine (BETADINE) 10 % external solution, Apply 1 Application topically as needed for wound care, Disp: 118 mL, Rfl: 1    warfarin (Coumadin) 5 mg tablet, Take 10 mg Fri and 7.5 mg all other days. Or as directed., Disp: 132 tablet, Rfl: 1  No Known Allergies  There were no vitals filed for this visit.      ROS:  CONSTITUTIONAL:  No fever. No chills. No dizziness. No weakness.  EYES:  No pain, erythema, or discharge. No blurring of vision.  ENT:  No sore throat, URI symptoms. No epistaxis. No tinnitus.  CARDIOVASCULAR:  No chest pain. No palpitations. No lower extremity edema.  RESPIRATORY:  No shortness of breath, cough, pain with respiration, pleuritic chest pain. No hemoptysis. No dyspnea. No paroxysmal nocturnal dyspnea.  GASTROINTESTINAL:  Normal appetite. No nausea, vomiting, diarrhea. No pain. No bloating. No melena.  GENITOURINARY:  No frequency, urgency, nocturia. No hematuria or dysuria.  MUSCULOSKELETAL:  No arthralgias or myalgias.  INTEGUMENTARY:  No swelling. No bruising. No contusions. No abrasions. No lymphangitis.  NEUROLOGIC:  No headache. No neck pain. No numbness or tingling of the extremities. No weakness.  PSYCHIATRIC:  No confusion.  ENDOCRINE:  No fatigue. No weakness. No history of thyroid, diabetes or adrenal problems.  HEMATOLOGICAL:  No bleeding. No petechiae. No bruising.    PHYSICAL EXAM:    GENERAL: AAO x 3  HEENT: AT,NC  CVS: S1S2 RRR  LUNGS: CTA b/l  ABD: NT,ND, +BS  EXTR: no edema  NEURO: CN II-XII grossly intact    LABS:  I  "have reviewed pertinent labs:  CBC:   Lab Results   Component Value Date    WBC 12.18 (H) 08/28/2023    RBC 4.69 08/28/2023    HGB 12.8 08/28/2023    HCT 38.8 08/28/2023    MCV 83 08/28/2023     (H) 08/28/2023    MCH 27.3 08/28/2023    MCHC 33.0 08/28/2023    RDW 13.3 08/28/2023    MPV 8.5 (L) 08/28/2023    NEUTROABS 6.96 08/28/2023     CMP:   Lab Results   Component Value Date    SODIUM 135 08/28/2023    K 3.6 08/28/2023    CL 98 08/28/2023    CO2 30 08/28/2023    AGAP 7 08/28/2023    BUN 10 08/28/2023    CREATININE 0.74 08/28/2023    GLUC 109 08/28/2023    GLUF 97 07/21/2023    CALCIUM 10.0 08/28/2023    AST 19 08/28/2023    ALT 32 08/28/2023    ALKPHOS 95 08/28/2023    TP 9.0 (H) 08/28/2023    ALB 5.0 08/28/2023    TBILI 0.38 08/28/2023    EGFR 104 08/28/2023     Liver Enzymes:   Lab Results   Component Value Date    AST 19 08/28/2023    ALT 32 08/28/2023    ALKPHOS 95 08/28/2023    TP 9.0 (H) 08/28/2023    ALB 5.0 08/28/2023    TBILI 0.38 08/28/2023     Vitamin B12   Lab Results   Component Value Date    GDOCFWNJ18 388 08/28/2023     Iron Study   Lab Results   Component Value Date    FERRITIN 78 08/28/2023    CONCFE 17 08/28/2023    TIBC 410 08/28/2023    IRON 70 08/28/2023     Folate   Lab Results   Component Value Date    FOLATE 6.7 08/28/2023     Magnesium No results found for: \"MG\"  Phosphorus No results found for: \"PHOS\"  Coagulation Panel   Lab Results   Component Value Date    PROTIME 23.7 (H) 12/12/2023    INR 2.02 (H) 12/12/2023    PTT 89 (H) 08/04/2023       IMAGING:  CTA chest abdomen pelvis w wo contrast    Result Date: 7/31/2023  Narrative: CTA - CHEST, ABDOMEN AND PELVIS - WITHOUT AND WITH IV CONTRAST INDICATION:   Evaluate for aortic thrombus formation. COMPARISON: CT angiogram July 29, 2023 TECHNIQUE: CT examination of the chest, abdomen and pelvis was performed both prior to and after the administration of intravenous contrast.  The noncontrast portion of this examination was performed " utilizing low radiation dose technique.   Thin section  angiographic arterial phase post contrast technique was used in order to evaluate for aortic dissection.  3D reformatted images and volume rendering were performed on an independent workstation.  Additionally, axial, sagittal, and coronal 2D reformatted  images were created from the source data and submitted for interpretation. Radiation dose length product (DLP) for this visit:  1693.92 mGy-cm .  This examination, like all CT scans performed in the  Network, was performed utilizing techniques to minimize radiation dose exposure, including the use of iterative reconstruction and automated exposure control. IV Contrast:  100 mL of iohexol (OMNIPAQUE) Enteric Contrast:  Enteric contrast was not administered. FINDINGS: AORTA:  There is no aortic dissection or intramural hematoma. There is no aortic aneurysm. Atherosclerotic changes of the descending aorta and at the bifurcation, as well as bilateral external and internal iliac arteries. No significant stenosis. CHEST LUNGS: No consolidation. No edema. Minimal subpleural reticulation. Numerous groundglass opacities throughout both upper lobes. No suspicious solid nodules. PLEURA: No pleural effusion. No pneumothorax. HEART/PULMONARY ARTERIAL TREE: Calcification of the aortic annulus. MEDIASTINUM AND MELODIE:  Unremarkable. CHEST WALL AND LOWER NECK:   Unremarkable. ABDOMEN LIVER/BILIARY TREE: Normal size and morphology. Several tiny foci of hyperenhancement are noted, including: - Segment 4a measuring 0.5 cm (series 3/120, 601/40). - Segment 5 measuring 0..6 cm (series 3/121, 601/96). - Segment 6 measuring 0.3 cm (series 3/131, 601/96). GALLBLADDER:  No calcified gallstones. No pericholecystic inflammatory change. SPLEEN:  Unremarkable. PANCREAS: Normal pancreatic parenchymal bulk and morphology. No main pancreatic ductal dilatation. No inflammation. Again seen is a 7 mm hyperenhancing focus in the  pancreatic tail (series 3/114, 601/91). ADRENAL GLANDS: Normal left adrenal gland. There is a 1.5 x 1.1 cm low-attenuation (5 HU) right adrenal nodule, unchanged. KIDNEYS/URETERS: Redemonstration of 2.4 x 2.0 cm posterior right upper pole cystic lesion with multiple calcifications. No suspicious solid lesions. No calculi. No hydronephrosis.. STOMACH AND BOWEL:  Unremarkable. APPENDIX:  No findings to suggest appendicitis. ABDOMINOPELVIC CAVITY:  No ascites or free intraperitoneal air. No lymphadenopathy. PELVIS REPRODUCTIVE ORGANS: The prostate is enlarged measuring 4.2 x 4.7 x 4.1 cm, 53 mm. URINARY BLADDER: Normal wall thickness. Small amount of intraluminal gas, likely related to recent catheterization. ABDOMINAL WALL/INGUINAL REGIONS: Post right femoral access subcutaneous changes without evidence of pseudoaneurysm. OSSEOUS STRUCTURES:  No acute fracture or destructive osseous lesion.     Impression: 1.  No acute aortic pathology. 2.  Numerous groundglass opacities throughout the upper lobes concerning for atypical infectious process. 3.  Redemonstration of a hyperenhancing 7 mm lesion in the pancreatic tail suggestive of a pancreatic neuroendocrine tumor. There are multiple subcentimeter foci of hyperenhancement also seen in the liver which are compatible with vascular shunting, however metastatic lesions would be a consideration in the context of suspected pancreatic neoplasm. Both findings could be further evaluated with contrast-enhanced MRI abdomen on an outpatient basis. 4.  Right adrenal nodule measuring up to 1.5 cm. Its imaging features are diagnostic of benign adrenal adenoma, and does not need further follow-up or work-up. If there are clinical signs or symptoms of adrenal hyperfunction, biochemical evaluation may be appropriate. Adrenal recommendation based on institutional consensus and Journal of American College of Radiology 2017;14:6964-3728. The study was marked in EPIC for significant  notification. Resident: CLEM Toscano I, the attending radiologist, have reviewed the images and agree with the final report above. Workstation performed: [id number]     IR lower extremity angiogram    Result Date: 2023  Narrative: Table formatting from the original result was not included. Images from the original result were not included. Eli Berry MD Physician Vascular Surgery Op Note   Addendum Date of Service:  2023  3:34 PM Case Time:  Procedures:  Surgeons:  2023  3:34 PM RIGHT LEG BALLOON ANGIOPLASTY, STENT, FASCIOTOMY  MD Kimberlee Lucero MD Rajbir Singh, DO                                                                                                                                                                                                                                                                                                                                                         OPERATIVE REPORT PATIENT NAME: Hector Mac  :  1970 MRN: [id number] Pt Location: [facility] HYBRID OR ROOM 02   SURGERY DATE: 2023   Surgeon(s) and Role:    * Eli Berry MD - Primary    * Kimberlee Colon MD - Assisting    * Luis Felipe Williamson DO - Assisting   Preop Diagnosis: Pain in right leg [M79.604] Arterial occlusion [I70.90]   Post-Op Diagnosis Codes:    * Pain in right leg [M79.604]    * Arterial occlusion [I70.90]   Procedure(s): Right - RIGHT SFA CUTDOWN, RIGHT LEG ANGIOGRAM, RIGHT LEG BALLOON ANGIOPLASTY / STENT. RIGHT LEG FASCIOTOMY   Specimen(s): * No specimens in log *   Estimated Blood Loss: Minimal   Drains: Urethral Catheter Latex 16 Fr. (Active) Number of days: 0     Anesthesia Type: General   Operative Indications: Pain in right leg [M79.604] Arterial occlusion [I70.90] 53-year-old male with past medical history of hypertension, hyperlipidemia and smoking who presents with 11-day history of right leg pain and difficulty bearing weight.   Patient started having sudden onset of right leg pain and inability to walk about 11 days ago.  He first went to [facility] where he was evaluated with a Doppler and everything was negative so he was sent home after Toradol shot.  He continued to have difficulty with walking and started using crutches.  He then went to see his family physician who ordered a Doppler which showed occlusion of the artery and was sent over to the emergency room.  Patient complains of swelling in his right outer calf and some mild numbness in the foot.  It is also very painful to move his foot.  On exam the right anterior and lateral compartment of the leg are significantly tender and swollen, per patient this has been ongoing for the last few days, could be element of compartment syndrome versus ischemia reperfusion related edema of the anterior compartment muscles.  He has been on a heparin drip since admission.  We will obtain CT angiogram which shows segmental occlusion of right SFA.  Patient will require right anterolateral fasciotomy and revascularization with possible thrombectomy of the SFA.  On exam today there is biphasic anterior tibial and posterior tibial signals.  Right foot cap refill less than 3 seconds.   Operative Findings: Chronic thrombus in distal SFA, about 2 cm occlusion.  There is calcified plaque in that region, so could be acute on chronic process.  After 6x50 Viabhan and 6x40 mm Shagufta, there is complete resolution of the occlusion, remainder of the SFA, Popliteal trifucation are patent with 3 vessel runoff.   Bulging of anterior compartment muscles, healthy and contractile   Complications: None   Procedure and Technique: Pt was brought to Hybrid room with continuous heparin drip, general anesthesia was induced, jimenes catheter placed. Entire right leg was circumferentially prepped. Full time out was performed. Right SFA cutdown in upper thigh was made. After retracting the sartorius muscle we exposed  the SFA and encircled it proximal and distally with vessel loops.   We punctured the SFA antegrade with micropuncture and microwire and placed micro sheath, then over bentson wire we placed a 6Fr sheath in to SFA. Left leg runoff was performed which showed focal occlusion of distal SFA with subacute thrombotic lesion in prior old plaque.  There is a 3 vessel runoff.   So using V18 wire the lesion was crossed in true lumen. The vessel loop was used to occlude the SFA to prevent any antegrade flow so that we do not risk embolization during lesion treatment.  A 6x50mm viabahn was placed across lesion and deployed under roadmap.  Then it was post dilated with a 5x40mm balloon.  There was some residual thrombus at the distal edge of stent.  Hence a 6x40mm Shagufta stent was placed across this distal edge of stent with adequate overlap in to the Viabahn.  This stent was not dilated with balloon to avoid risk of embolization of any thrombotic material.  Completion angiogram was satisfactory with complete treatment of lesion and 3-vessel runoff.  The sheath and wire were removed and arteriotomy was closed with a 6-0 prolene U-stitch after flushing the artery.   We started with a fasciotomy of the anterior and lateral compartments. A generous incision was made between the tibia and the fibula using 15 blade. Then the fascia overlying the anterior compartment was divided along the leg sharply. There was diffuse bulging of the muscles of the anterior compartment. There were contractile and pink. They were not grossly necrotic. We then raised subcutaneous flaps and expose the lateral compartment. An incision was made over the lateral compartment using Bovie electrocautery and these muscles were also bulging. And then the fasciotomy incision was extended proximally and distally to completely release the lateral compartment as well. Care was taken to avoid the area of the peroneal nerve proximally.  2-0 Nylon sutures vertical  mattress skin sutures were taken but left untied along the fasciotomy incision.      I was present for the entire procedure.   Patient Disposition: PACU  and patient will return to OR for planned surgery as a staged procedure for fasciotomy closure at a later date.       SIGNATURE: Eli Berry MD DATE: July 29, 2023 TIME: 5:05 PM           Vascular Quality Initiative - Peripheral Vascular Intervention   Urgency: Emergent   Functional Status:  Fully active; able to carry on all predisease activities without restriction. Ambulation: Amb = independently ambulatory   Leg Symptoms    RIGHT: Acute Ischemia:  Acute limb ischemia is defined as a sudden decrease in limb perfusion that causes a potential threat to limb viability (manifested by ischemic rest pain, ischemic ulcers, and/or gangrene) in patients who present within two weeks of the acute event  Immediately Threatened limbs: are salvageable with immediate revascularization. Sensory loss involves more than the toes and may be associated with rest pain. There is mild to moderate muscle weakness, arterial Doppler signals are usually inaudible, and venous Doppler signals are audible   LEFT : None   COVID Information COVID Symptoms Pre-Procedure: Asymptomatic  Treatment Delayed by Pandemic: None   Access Number of Sites: 1   Access Site 1:   Side 1: Right   Site 1: SFA   Access Guidance 1:Open Exposure   Largest Sheath Size 1: 6 Fr.   Closure Device 1: None   None   Procedure Fluoro Time: 11.4 minutes Contrast Volume: Visipaque 90 ml DAP: 8.46 Gy.cm2 CO2: no Anticoagulant: Heparin  Protamine: No If Creatinine is > 1.2 or missing, BOB Prophylaxis none  Treatment Details Indication: Occlusive Disease,  Completion Assessment Artery 1 treated: SFA        Right             Outflow: AT,PT,Peroneal: 3                Was this Site previously treated?: No         TASC Grade: B         Total Treated Length: 6 cm         Total Occluded Length: 3 cm         Calcification:  Moderate (calcification on both sides of artery < half length of lesion)         Number of Treatment types (Devices):   2         Device 1         Treatment Type: Stent Graft              Diameter: 6 mm         Length: 50 mm        Device 2         Treatment Type: Stent,  Drug Eluting Stent              Diameter: 6 mm         Length: 40 mm     Device 3         Treatment Type: Plain balloon           Concomitant: None         Technical result: Successful (stenosis <=30%)    None   Post Procedure Patient currently taking:          Statin, Yes                                                 Antiplatelet Medication, Yes   Procedure Complications: No        Electronically signed by Eli Berry MD at 7/29/2023  7:59 PM     CTA abdominal w run off w wo contrast    Result Date: 7/31/2023  Narrative: CT ANGIOGRAM OF THE AORTA AND LOWER EXTREMITIES WITH IV CONTRAST INDICATION: Evaluate right lower extremity for occlusive disease. Sudden onset right lower extremity pain with inability to bear weight. COMPARISON: None. TECHNIQUE:  CT angiogram examination of the abdomen, pelvis, and lower extremities was performed according to standard protocol with intravenous contrast.  This examination, like all CT scans performed in the [facility], was performed utilizing techniques to minimize radiation dose exposure, including the use of iterative reconstruction and automated exposure control.  3D reconstructions were performed an independent workstation, and are supplied for review.   Rad dose 2155.32 mGy-cm IV Contrast:  100 mL of iohexol (OMNIPAQUE) FINDINGS: VASCULAR STRUCTURES: The visualized thoracoabdominal aorta is patent and of normal caliber, with moderate infrarenal mixed atherosclerotic disease, no significant stenosis. The mesenteric and renal arteries are patent. RIGHT: Common iliac artery: Patent without significant stenosis. External iliac artery: Patent. Internal iliac artery and branches:  Patent. Common femoral artery: Patent. Profundofemoral artery and branches: Patent. Superficial femoral artery: Short segment focal occlusion of the mid to distal SFA, in a region of dense calcific plaque, axial series 2 image 246. There is short interval reconstitution. The remainder of the SFA is patent without focal abnormality. Popliteal artery: Patent. Patent three-vessel runoff. LEFT: Common iliac artery: Patent without significant stenosis. External iliac artery: Patent. Internal iliac artery and branches: Patent. Common femoral artery: Patent. Profundofemoral artery and branches: Patent. Superficial femoral artery: Patent. Popliteal artery: Patent. Patent three-vessel runoff. OTHER FINDINGS ABDOMEN LOWER CHEST:  No significant abnormality in the lung bases. LIVER/BILIARY TREE:  Unremarkable. GALLBLADDER:  No calcified gallstones. No pericholecystic inflammatory change. SPLEEN:  Unremarkable.  Normal size. PANCREAS: Rounded arterially enhancing pancreatic tail lesion measuring 7 mm, axial series 2 image 30. Not completely evaluated on this single phase arterial exam. ADRENAL GLANDS: 1.5 x 1.0 cm nodule involving the medial limb of the right adrenal gland, axial series 2 image 20. Incompletely evaluated on this single phase arterial exam. The left adrenal gland is unremarkable. KIDNEYS/URETERS:  No solid renal mass. No hydronephrosis. No urinary tract calculi. 2.7 x 2.3 x 2.4 cm posterior right upper pole cystic lesion demonstrating several coarse mural calcifications along the posterior inferior wall. Not completely evaluated on this single phase arterial exam. Subcentimeter rounded hypodensities in the bilateral kidneys are too small to characterize on CT, most likely represent cysts. PELVIS REPRODUCTIVE ORGANS:  Unremarkable for patient's age. URINARY BLADDER:  Unremarkable. ADDITIONAL ABDOMINAL AND PELVIC STRUCTURES STOMACH AND BOWEL: Unremarkable. ABDOMINOPELVIC CAVITY:   No pathologically enlarged  mesenteric or retroperitoneal lymph nodes. No ascites or free intraperitoneal air. ABDOMINAL WALL/INGUINAL REGIONS:  Unremarkable. OSSEOUS STRUCTURES:  No acute fracture or destructive osseous lesion.     Impression: 1. Focal mid to distal right SFA occlusion with short interval reconstitution. Patent three-vessel runoff. Vascular surgery is aware of these findings at the time of dictation. 2. 7 mm arterially enhancing lesion within the tail of the pancreas. 3. Cystic lesion within the right kidney measuring up to 2.7 cm, demonstrating mural calcifications. 4. Right adrenal nodule measuring up to 1.5 cm. The above incidental findings are incompletely evaluated on this single phase arterial exam. Recommend further work-up with a multiphase CT versus abdominal MRI / MRCP, with and without contrast. Workstation performed: [id number]     Echo complete w/ contrast if indicated    Result Date: 7/30/2023  Narrative:   Left Ventricle: Left ventricular cavity size is normal. Wall thickness is normal. The left ventricular ejection fraction is 65%. Systolic function is normal. Wall motion is normal. Diastolic function is normal.   Atrial Septum: No patent foramen ovale detected using color flow Doppler at rest. No diagnostic evidence of patent foramen ovale or intracardiac source of embolism.  Technically limited evaluation for the same. If high clinical suspicion for intracardiac source of embolism, then transesophageal echocardiogram may provide additional information.     I reviewed the above laboratory and imaging data.    ASSESSMENT/PLAN:  1. 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 On anticoagulation with Coumadin. Follows with vascular surgery. Llaboratory work-up unrevealing.   2. 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  Endoscopic ultrasound could be considered for further evaluation, though location in the far tail may make characterization difficult. Follows with surgical oncology. Will repeat MRI in . Chromogranin. 5 HIAA pending.Chromogranin A normal at 39.2, CA 19-9 normal at 6.     MRI abdomen 12/12/23--unremarkable pancreas. The previously questioned hyperenhancing lesion in the tail is inconspicuous on this and prior MRI. Since the lesion was identified on CT, consider pancreatic protocol CT for final rule in or rule out of a space-occupying lesion. Unchanged 7 mm segment 4B hepatic lesion with typical features of a hemangioma. Other previously measured foci of restricted diffusion are not outlined/measurable, without differential intensity or enhancement in those regions, going against true space-occupying lesions. CT abdomen ordered.   3. Enhancing Bosniak 3 right renal cyst, unchanged in size. Urology consult placed.  3. Right adrenal nodule. Likely benign.   4. Multiple subcentimeter foci of hyperenhancement also seen in the liver which are compatible with vascular shunting, however metastatic lesions would be a consideration in the context of suspected pancreatic neoplasm. MRI--7 mm T2 hyperintense, enhancing lesion in segment IVB. Even though marked T2 hyperintensity and centripetal fill of contrast are present, thin complete ring enhancement on early phase is atypical for hemangioma. 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 Additionally, there is a DWI-only focus in segment VI and focus of hyperenhancement in segment II that are also potentially concerning for metastases. Repeat MRI as above--unchanged 7 mm segment 4B hepatic lesion with typical features of a hemangioma. Other previously measured foci of restricted diffusion are not outlined/measurable, without differential intensity or enhancement in those regions, going against true space-occupying lesions.   5. Leukocytosis, anemia and thrombocytosis. Jak2  mutational analysis w/ reflexing cascade pending.    6. Smoking hx-- 1ppd since age 18, quit several m ago. CT lung screen ordered   Follow-up in 4 weeks.

## 2023-12-20 DIAGNOSIS — M79.2 NEUROPATHIC PAIN OF RIGHT FOOT: ICD-10-CM

## 2023-12-20 DIAGNOSIS — M79.604 PAIN IN RIGHT LEG: ICD-10-CM

## 2023-12-20 RX ORDER — DULOXETIN HYDROCHLORIDE 60 MG/1
60 CAPSULE, DELAYED RELEASE ORAL DAILY
Qty: 30 CAPSULE | Refills: 0 | Status: SHIPPED | OUTPATIENT
Start: 2023-12-20

## 2024-01-02 ENCOUNTER — OFFICE VISIT (OUTPATIENT)
Dept: VASCULAR SURGERY | Facility: CLINIC | Age: 54
End: 2024-01-02
Payer: COMMERCIAL

## 2024-01-02 VITALS
BODY MASS INDEX: 23.85 KG/M2 | DIASTOLIC BLOOD PRESSURE: 70 MMHG | SYSTOLIC BLOOD PRESSURE: 150 MMHG | HEIGHT: 69 IN | HEART RATE: 94 BPM | WEIGHT: 161 LBS

## 2024-01-02 DIAGNOSIS — I74.4 EMBOLISM AND THROMBOSIS OF ARTERIES OF EXTREMITIES (HCC): Primary | ICD-10-CM

## 2024-01-02 PROBLEM — F17.200 TOBACCO DEPENDENCE: Status: RESOLVED | Noted: 2018-10-03 | Resolved: 2024-01-02

## 2024-01-02 PROCEDURE — 99213 OFFICE O/P EST LOW 20 MIN: CPT | Performed by: SURGERY

## 2024-01-02 NOTE — ASSESSMENT & PLAN NOTE
RIGHT leg acute ischemia due to occlusion of the distal SFA and popliteal artery, S/p R SFA cutdown, balloon angioplasty & stenting 7-29-23  as well as R leg fasciotomy with closure on 8/3/23     Right leg numbness and pain persists, he is on long term cymbalta and gabapentin.    On warfarin and plavix due to arterial thrombosis of unknown cause.    After total 6 months we will stop Plavix and start 81mg aspirin daily.    1/29/24 will be last day of plavix.  Then start 81mg Asprin daily from 1/30/24  We have to do long term warfarin.

## 2024-01-02 NOTE — PROGRESS NOTES
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    On warfarin and plavix due to arterial thrombosis of unknown cause.    After total 6 months we will stop Plavix and start 81mg aspirin daily.    1/29/24 will be last day of plavix.  Then start 81mg Asprin daily from 1/30/24  We have to do long term warfarin.             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 male.    Patient presents to review SAMARIA done 12/18 w/ h/o RLE intervention.    HPI    The following portions of the patient's history were reviewed and updated as appropriate: allergies, current medications, past family history, past medical history, past social history, past surgical history, and problem list.    Review of Systems   Constitutional: Negative.    HENT: Negative.     Eyes: Negative.    Respiratory: Negative.     Cardiovascular: Negative.    Gastrointestinal: Negative.    Endocrine: Negative.    Genitourinary: Negative.    Musculoskeletal: Negative.    Skin: Negative.    Allergic/Immunologic: Negative.    Neurological: Negative.    Hematological: Negative.    Psychiatric/Behavioral: Negative.       I have reviewed the ROS as entered and made changes as necessary.      Objective:      /70 (BP Location: Right arm, Patient Position: Sitting, Cuff Size: Standard)   Pulse 94   Ht 5' 9\" (1.753 m)   Wt 73 kg (161 lb)   BMI 23.78 kg/m²          Physical Exam  Vitals and nursing note reviewed.   Constitutional:       Appearance: Normal appearance. "   HENT:      Head: Normocephalic and atraumatic.   Cardiovascular:      Rate and Rhythm: Normal rate and regular rhythm.      Comments: 2+ right dorsalis pedis pulse  Musculoskeletal:      Right lower leg: Edema (improved) present.      Comments: Right anterior fasciotomy incision has healed well.  There is a dry scab overlying the incision line.  No drainage.   Skin:     Capillary Refill: Capillary refill takes less than 2 seconds.   Neurological:      Mental Status: He is alert.   Psychiatric:         Mood and Affect: Mood normal.         Behavior: Behavior normal.

## 2024-01-03 ENCOUNTER — TELEPHONE (OUTPATIENT)
Dept: SURGICAL ONCOLOGY | Facility: CLINIC | Age: 54
End: 2024-01-03

## 2024-01-03 NOTE — TELEPHONE ENCOUNTER
Left voice message for patient to reschedule follow up appointment 1/8/24 with NATALYA Limon after Cat Scan results, appointment scheduled 1/9/24.

## 2024-01-04 ENCOUNTER — TELEPHONE (OUTPATIENT)
Dept: VASCULAR SURGERY | Facility: CLINIC | Age: 54
End: 2024-01-04

## 2024-01-07 DIAGNOSIS — M62.831 MUSCLE SPASM OF CALF: ICD-10-CM

## 2024-01-08 ENCOUNTER — TELEPHONE (OUTPATIENT)
Dept: HEMATOLOGY ONCOLOGY | Facility: CLINIC | Age: 54
End: 2024-01-08

## 2024-01-08 NOTE — TELEPHONE ENCOUNTER
Per provider:  Let patient know Urology consult was placed for renal cyst evaluation      Called patient, wife, Maria Luisa answered. I made her aware that a urology consult was placed for patient for renal cyst evaluation- per Dr. Diaz and that they can expect a phone call for an appointment to be made. Wife verbalized understanding and has no other questions or concerns at this moment.

## 2024-01-10 RX ORDER — METHOCARBAMOL 500 MG/1
500 TABLET, FILM COATED ORAL 4 TIMES DAILY
Qty: 120 TABLET | Refills: 0 | Status: SHIPPED | OUTPATIENT
Start: 2024-01-10

## 2024-01-23 ENCOUNTER — TELEPHONE (OUTPATIENT)
Dept: HEMATOLOGY ONCOLOGY | Facility: CLINIC | Age: 54
End: 2024-01-23

## 2024-01-23 NOTE — TELEPHONE ENCOUNTER
Appointment Change  Cancel, Reschedule, Change to Virtual      Who are you speaking with? Spouse   If it is not the patient, is the caller listed on the communication consent form? Yes   Which provider is the appointment scheduled with? NATALYA Chong   When was the original appointment scheduled?    Please list date and time 1/8/24 @ 130   At which location is the appointment scheduled to take place? [place] ([facility])   Was the appointment rescheduled?     Was the appointment changed from an in person visit to a virtual visit?    If so, please list the details of the change. 2/26/24 @ 11   What is the reason for the appointment change? CT was moved to 2/13/24       Was [facility] transport scheduled? No   Does STAR transport need to be scheduled for the new visit (if applicable) No   Does the patient need an infusion appointment rescheduled? No   Does the patient have an upcoming infusion appointment scheduled? If so, when? No   Is the patient undergoing chemotherapy? No   For appointments cancelled with less than 24 hours:  Was the no-show policy reviewed? N/A

## 2024-02-11 DIAGNOSIS — M79.604 PAIN IN RIGHT LEG: ICD-10-CM

## 2024-02-12 RX ORDER — GABAPENTIN 300 MG/1
300 CAPSULE ORAL 3 TIMES DAILY
Qty: 90 CAPSULE | Refills: 2 | Status: SHIPPED | OUTPATIENT
Start: 2024-02-12

## 2024-02-15 ENCOUNTER — TELEPHONE (OUTPATIENT)
Dept: SURGICAL ONCOLOGY | Facility: CLINIC | Age: 54
End: 2024-02-15

## 2024-02-15 NOTE — TELEPHONE ENCOUNTER
Left message that his follow up to his CT with Delia Alfaro is now 3/11/24 @  2:30 pm  So he can get his results from imaging, left [phone] if this time/date was an issue and he needed to reschedule

## 2024-02-20 DIAGNOSIS — M79.604 PAIN IN RIGHT LEG: ICD-10-CM

## 2024-02-20 RX ORDER — CLOPIDOGREL BISULFATE 75 MG/1
75 TABLET ORAL DAILY
Qty: 90 TABLET | Refills: 1 | Status: SHIPPED | OUTPATIENT
Start: 2024-02-20

## 2024-03-04 ENCOUNTER — TELEPHONE (OUTPATIENT)
Dept: SURGICAL ONCOLOGY | Facility: CLINIC | Age: 54
End: 2024-03-04

## 2024-03-04 NOTE — TELEPHONE ENCOUNTER
Left VM for Pt advising that his appt prabhjot/ Delia Alfaro for 3/11 is being cancelled since his CT Abdomen has been pushed out to 3/18 and he sees Dr Gracia for follow up on 4/17.

## 2024-03-05 ENCOUNTER — OFFICE VISIT (OUTPATIENT)
Dept: FAMILY MEDICINE CLINIC | Facility: CLINIC | Age: 54
End: 2024-03-05
Payer: COMMERCIAL

## 2024-03-05 ENCOUNTER — APPOINTMENT (OUTPATIENT)
Dept: LAB | Facility: CLINIC | Age: 54
End: 2024-03-05
Payer: COMMERCIAL

## 2024-03-05 ENCOUNTER — APPOINTMENT (OUTPATIENT)
Dept: RADIOLOGY | Facility: CLINIC | Age: 54
End: 2024-03-05
Payer: COMMERCIAL

## 2024-03-05 VITALS
HEIGHT: 69 IN | BODY MASS INDEX: 25.33 KG/M2 | OXYGEN SATURATION: 98 % | HEART RATE: 80 BPM | WEIGHT: 171 LBS | SYSTOLIC BLOOD PRESSURE: 150 MMHG | DIASTOLIC BLOOD PRESSURE: 70 MMHG | TEMPERATURE: 97.7 F

## 2024-03-05 DIAGNOSIS — I10 ESSENTIAL HYPERTENSION: Primary | ICD-10-CM

## 2024-03-05 DIAGNOSIS — Z13.29 THYROID DISORDER SCREEN: ICD-10-CM

## 2024-03-05 DIAGNOSIS — E78.2 HYPERLIPIDEMIA, MIXED: ICD-10-CM

## 2024-03-05 DIAGNOSIS — R73.03 PREDIABETES: ICD-10-CM

## 2024-03-05 DIAGNOSIS — R07.9 LEFT-SIDED CHEST PAIN: ICD-10-CM

## 2024-03-05 DIAGNOSIS — I10 ESSENTIAL HYPERTENSION: ICD-10-CM

## 2024-03-05 PROBLEM — E27.9 ADRENAL NODULE (HCC): Status: RESOLVED | Noted: 2023-08-16 | Resolved: 2024-03-05

## 2024-03-05 PROBLEM — E27.8 ADRENAL NODULE (HCC): Status: RESOLVED | Noted: 2023-08-16 | Resolved: 2024-03-05

## 2024-03-05 LAB
ALBUMIN SERPL BCP-MCNC: 4.3 G/DL (ref 3.5–5)
ALP SERPL-CCNC: 79 U/L (ref 34–104)
ALT SERPL W P-5'-P-CCNC: 20 U/L (ref 7–52)
ANION GAP SERPL CALCULATED.3IONS-SCNC: 4 MMOL/L
AST SERPL W P-5'-P-CCNC: 23 U/L (ref 13–39)
BILIRUB SERPL-MCNC: 0.45 MG/DL (ref 0.2–1)
BUN SERPL-MCNC: 10 MG/DL (ref 5–25)
CALCIUM SERPL-MCNC: 9.1 MG/DL (ref 8.4–10.2)
CHLORIDE SERPL-SCNC: 102 MMOL/L (ref 96–108)
CHOLEST SERPL-MCNC: 161 MG/DL
CO2 SERPL-SCNC: 31 MMOL/L (ref 21–32)
CREAT SERPL-MCNC: 0.82 MG/DL (ref 0.6–1.3)
GFR SERPL CREATININE-BSD FRML MDRD: 99 ML/MIN/1.73SQ M
GLUCOSE P FAST SERPL-MCNC: 102 MG/DL (ref 65–99)
HDLC SERPL-MCNC: 44 MG/DL
LDLC SERPL CALC-MCNC: 78 MG/DL (ref 0–100)
NONHDLC SERPL-MCNC: 117 MG/DL
POTASSIUM SERPL-SCNC: 4.3 MMOL/L (ref 3.5–5.3)
PROT SERPL-MCNC: 7.8 G/DL (ref 6.4–8.4)
SODIUM SERPL-SCNC: 137 MMOL/L (ref 135–147)
TRIGL SERPL-MCNC: 196 MG/DL
TSH SERPL DL<=0.05 MIU/L-ACNC: 2.55 UIU/ML (ref 0.45–4.5)

## 2024-03-05 PROCEDURE — 84443 ASSAY THYROID STIM HORMONE: CPT

## 2024-03-05 PROCEDURE — 99214 OFFICE O/P EST MOD 30 MIN: CPT | Performed by: STUDENT IN AN ORGANIZED HEALTH CARE EDUCATION/TRAINING PROGRAM

## 2024-03-05 PROCEDURE — 80053 COMPREHEN METABOLIC PANEL: CPT

## 2024-03-05 PROCEDURE — 36415 COLL VENOUS BLD VENIPUNCTURE: CPT

## 2024-03-05 PROCEDURE — 71046 X-RAY EXAM CHEST 2 VIEWS: CPT

## 2024-03-05 PROCEDURE — 80061 LIPID PANEL: CPT

## 2024-03-05 PROCEDURE — 83036 HEMOGLOBIN GLYCOSYLATED A1C: CPT

## 2024-03-05 RX ORDER — ASPIRIN 81 MG/1
81 TABLET ORAL DAILY
COMMUNITY

## 2024-03-05 NOTE — ASSESSMENT & PLAN NOTE
Patient endorses compliance with Norvasc   Reports that his wife has also taken special attention to his meals and he has been eating low fat and low salt.   BP systolic on the higher end today 150, will continue Norvasc as is, patient to monitor BP at home

## 2024-03-05 NOTE — PROGRESS NOTES
Name: Hector Mac      : 1970      MRN: [id number]  Encounter Provider: Keely Ramirez DO  Encounter Date: 3/5/2024   Encounter department: [facility]    Assessment & Plan     1. Essential hypertension  Assessment & Plan:  Patient endorses compliance with Norvasc   Reports that his wife has also taken special attention to his meals and he has been eating low fat and low salt.   BP systolic on the higher end today 150, will continue Norvasc as is, patient to monitor BP at home    Orders:  -     Comprehensive metabolic panel; Future    2. Hyperlipidemia, mixed  -     Lipid panel; Future    3. Prediabetes  Assessment & Plan:  Repeat A1c pending. Dietary changes discussed.     Orders:  -     Comprehensive metabolic panel; Future  -     Hemoglobin A1C; Future    4. Thyroid disorder screen  -     TSH, 3rd generation with Free T4 reflex; Future    5. Left-sided chest pain  Assessment & Plan:  Symptom onset while lifting heavy item off ground at work, worsened with deep breathing. Patient denies any sob, chest pressure or diaphoresis.     Likely MSK in nature, lung fields clear and equal to auscultation.   Will check CXR, will f/u pending results.     Orders:  -     XR chest pa & lateral; Future; Expected date: 2024         Subjective      Patient presents today for routine f/u  States that he has been back to work and feels well  States that he still has numbness in right LE and is worried about when things will get back to normal.           Review of Systems   Constitutional:  Negative for chills and fever.   HENT:  Negative for congestion and rhinorrhea.    Respiratory:  Negative for cough and shortness of breath.    Cardiovascular:  Negative for chest pain and palpitations.   Gastrointestinal:  Negative for abdominal pain, constipation, diarrhea, nausea and vomiting.   Neurological:  Positive for numbness. Negative for dizziness and headaches.        RLE Numbness  "      Current Outpatient Medications on File Prior to Visit   Medication Sig   • acetaminophen (TYLENOL) 325 mg tablet Take 2 tablets (650 mg total) by mouth every 6 (six) hours as needed for mild pain   • amLODIPine (NORVASC) 5 mg tablet Take 1 tablet (5 mg total) by mouth daily   • aspirin (ECOTRIN LOW STRENGTH) 81 mg EC tablet Take 81 mg by mouth daily   • atorvastatin (LIPITOR) 40 mg tablet take 1 tablet by mouth once daily after dinner   • DULoxetine (CYMBALTA) 60 mg delayed release capsule take 1 capsule by mouth once daily   • gabapentin (NEURONTIN) 300 mg capsule take 1 capsule by mouth three times a day   • methocarbamol (ROBAXIN) 500 mg tablet Take 1 tablet (500 mg total) by mouth 4 (four) times a day   • warfarin (Coumadin) 5 mg tablet Take 10 mg Fri and 7.5 mg all other days. Or as directed.   • [DISCONTINUED] clopidogrel (PLAVIX) 75 mg tablet take 1 tablet by mouth once daily (Patient not taking: Reported on 3/5/2024)   • [DISCONTINUED] povidone-iodine (BETADINE) 10 % external solution Apply 1 Application topically as needed for wound care (Patient not taking: Reported on 3/5/2024)       Objective     /70 (BP Location: Left arm, Patient Position: Sitting, Cuff Size: Large)   Pulse 80   Temp 97.7 °F (36.5 °C) (Temporal)   Ht 5' 9\" (1.753 m)   Wt 77.6 kg (171 lb)   SpO2 98%   BMI 25.25 kg/m²     Physical Exam  Vitals reviewed.   Constitutional:       Appearance: Normal appearance.   HENT:      Head: Normocephalic and atraumatic.      Mouth/Throat:      Mouth: Mucous membranes are moist.      Pharynx: No oropharyngeal exudate or posterior oropharyngeal erythema.   Eyes:      Extraocular Movements: Extraocular movements intact.   Cardiovascular:      Rate and Rhythm: Normal rate and regular rhythm.      Heart sounds: Normal heart sounds.   Pulmonary:      Effort: Pulmonary effort is normal.      Breath sounds: Normal breath sounds.   Musculoskeletal:      Right lower leg: No edema.      Left " lower leg: No edema.      Comments: Well healed scar on right LE   Neurological:      General: No focal deficit present.      Mental Status: He is alert and oriented to person, place, and time.   Psychiatric:         Mood and Affect: Mood normal.         Behavior: Behavior normal.       Keely Ramirez, DO

## 2024-03-05 NOTE — ASSESSMENT & PLAN NOTE
Symptom onset while lifting heavy item off ground at work, worsened with deep breathing. Patient denies any sob, chest pressure or diaphoresis.     Likely MSK in nature, lung fields clear and equal to auscultation.   Will check CXR, will f/u pending results.

## 2024-03-06 LAB
EST. AVERAGE GLUCOSE BLD GHB EST-MCNC: 134 MG/DL
HBA1C MFR BLD: 6.3 %

## 2024-03-16 DIAGNOSIS — M62.831 MUSCLE SPASM OF CALF: ICD-10-CM

## 2024-03-18 ENCOUNTER — HOSPITAL ENCOUNTER (OUTPATIENT)
Dept: CT IMAGING | Facility: HOSPITAL | Age: 54
Discharge: HOME | End: 2024-03-18
Attending: INTERNAL MEDICINE
Payer: COMMERCIAL

## 2024-03-18 DIAGNOSIS — K86.9 PANCREATIC LESION: ICD-10-CM

## 2024-03-18 DIAGNOSIS — F17.200 TOBACCO DEPENDENCE: ICD-10-CM

## 2024-03-18 PROCEDURE — G1004 CDSM NDSC: HCPCS

## 2024-03-18 PROCEDURE — 71271 CT THORAX LUNG CANCER SCR C-: CPT

## 2024-03-18 PROCEDURE — 74160 CT ABDOMEN W/CONTRAST: CPT

## 2024-03-18 RX ORDER — METHOCARBAMOL 500 MG/1
500 TABLET, FILM COATED ORAL 4 TIMES DAILY
Qty: 120 TABLET | Refills: 0 | Status: SHIPPED | OUTPATIENT
Start: 2024-03-18

## 2024-03-18 RX ADMIN — IOHEXOL 100 ML: 350 INJECTION, SOLUTION INTRAVENOUS at 07:21

## 2024-03-21 DIAGNOSIS — M79.2 NEUROPATHIC PAIN OF RIGHT FOOT: ICD-10-CM

## 2024-03-21 DIAGNOSIS — M79.604 PAIN IN RIGHT LEG: ICD-10-CM

## 2024-03-22 RX ORDER — DULOXETIN HYDROCHLORIDE 60 MG/1
60 CAPSULE, DELAYED RELEASE ORAL DAILY
Qty: 30 CAPSULE | Refills: 0 | Status: SHIPPED | OUTPATIENT
Start: 2024-03-22

## 2024-03-23 DIAGNOSIS — M79.604 PAIN IN RIGHT LEG: ICD-10-CM

## 2024-03-25 RX ORDER — ATORVASTATIN CALCIUM 40 MG/1
40 TABLET, FILM COATED ORAL
Qty: 90 TABLET | Refills: 0 | Status: SHIPPED | OUTPATIENT
Start: 2024-03-25

## 2024-04-01 ENCOUNTER — APPOINTMENT (EMERGENCY)
Dept: RADIOLOGY | Facility: HOSPITAL | Age: 54
End: 2024-04-01
Payer: COMMERCIAL

## 2024-04-01 ENCOUNTER — APPOINTMENT (EMERGENCY)
Dept: CT IMAGING | Facility: HOSPITAL | Age: 54
End: 2024-04-01
Payer: COMMERCIAL

## 2024-04-01 ENCOUNTER — HOSPITAL ENCOUNTER (EMERGENCY)
Facility: HOSPITAL | Age: 54
Discharge: HOME | End: 2024-04-01
Attending: EMERGENCY MEDICINE
Payer: COMMERCIAL

## 2024-04-01 VITALS
RESPIRATION RATE: 16 BRPM | SYSTOLIC BLOOD PRESSURE: 127 MMHG | DIASTOLIC BLOOD PRESSURE: 66 MMHG | HEIGHT: 69 IN | WEIGHT: 171 LBS | BODY MASS INDEX: 25.33 KG/M2 | OXYGEN SATURATION: 93 % | HEART RATE: 65 BPM | TEMPERATURE: 97.8 F

## 2024-04-01 DIAGNOSIS — S22.39XA CLOSED RIB FRACTURE: ICD-10-CM

## 2024-04-01 DIAGNOSIS — S32.009A LUMBAR TRANSVERSE PROCESS FRACTURE (HCC): ICD-10-CM
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
## 2024-04-01 LAB
ABO GROUP BLD: NORMAL
ALBUMIN SERPL BCP-MCNC: 4.2 G/DL (ref 3.5–5)
ALP SERPL-CCNC: 72 U/L (ref 34–104)
ALT SERPL W P-5'-P-CCNC: 16 U/L (ref 7–52)
ANION GAP SERPL CALCULATED.3IONS-SCNC: 6 MMOL/L (ref 4–13)
APTT PPP: 36 SECONDS (ref 23–37)
AST SERPL W P-5'-P-CCNC: 16 U/L (ref 13–39)
BASOPHILS # BLD AUTO: 0.08 THOUSANDS/ÂΜL (ref 0–0.1)
BASOPHILS NFR BLD AUTO: 1 % (ref 0–1)
BILIRUB SERPL-MCNC: 0.29 MG/DL (ref 0.2–1)
BLD GP AB SCN SERPL QL: NEGATIVE
BUN SERPL-MCNC: 13 MG/DL (ref 5–25)
CALCIUM SERPL-MCNC: 9 MG/DL (ref 8.4–10.2)
CHLORIDE SERPL-SCNC: 105 MMOL/L (ref 96–108)
CO2 SERPL-SCNC: 26 MMOL/L (ref 21–32)
CREAT SERPL-MCNC: 0.88 MG/DL (ref 0.6–1.3)
EOSINOPHIL # BLD AUTO: 0.17 THOUSAND/ÂΜL (ref 0–0.61)
EOSINOPHIL NFR BLD AUTO: 1 % (ref 0–6)
ERYTHROCYTE [DISTWIDTH] IN BLOOD BY AUTOMATED COUNT: 17.1 % (ref 11.6–15.1)
GFR SERPL CREATININE-BSD FRML MDRD: 96 ML/MIN/1.73SQ M
GLUCOSE SERPL-MCNC: 105 MG/DL (ref 65–140)
HCT VFR BLD AUTO: 36.7 % (ref 36.5–49.3)
HGB BLD-MCNC: 11.4 G/DL (ref 12–17)
IMM GRANULOCYTES # BLD AUTO: 0.03 THOUSAND/UL (ref 0–0.2)
IMM GRANULOCYTES NFR BLD AUTO: 0 % (ref 0–2)
INR PPP: 1.98 (ref 0.84–1.19)
LYMPHOCYTES # BLD AUTO: 2.15 THOUSANDS/ÂΜL (ref 0.6–4.47)
LYMPHOCYTES NFR BLD AUTO: 18 % (ref 14–44)
MCH RBC QN AUTO: 21.8 PG (ref 26.8–34.3)
MCHC RBC AUTO-ENTMCNC: 31.1 G/DL (ref 31.4–37.4)
MCV RBC AUTO: 70 FL (ref 82–98)
MONOCYTES # BLD AUTO: 0.8 THOUSAND/ÂΜL (ref 0.17–1.22)
MONOCYTES NFR BLD AUTO: 7 % (ref 4–12)
NEUTROPHILS # BLD AUTO: 9.06 THOUSANDS/ÂΜL (ref 1.85–7.62)
NEUTS SEG NFR BLD AUTO: 73 % (ref 43–75)
NRBC BLD AUTO-RTO: 0 /100 WBCS
PLATELET # BLD AUTO: 551 THOUSANDS/UL (ref 149–390)
PMV BLD AUTO: 8.4 FL (ref 8.9–12.7)
POTASSIUM SERPL-SCNC: 3.7 MMOL/L (ref 3.5–5.3)
PROT SERPL-MCNC: 7.9 G/DL (ref 6.4–8.4)
PROTHROMBIN TIME: 23.3 SECONDS (ref 11.6–14.5)
RBC # BLD AUTO: 5.23 MILLION/UL (ref 3.88–5.62)
RH BLD: POSITIVE
SODIUM SERPL-SCNC: 137 MMOL/L (ref 135–147)
SPECIMEN EXPIRATION DATE: NORMAL
WBC # BLD AUTO: 12.29 THOUSAND/UL (ref 4.31–10.16)

## 2024-04-01 PROCEDURE — 99284 EMERGENCY DEPT VISIT MOD MDM: CPT | Performed by: EMERGENCY MEDICINE

## 2024-04-01 PROCEDURE — 71260 CT THORAX DX C+: CPT

## 2024-04-01 PROCEDURE — 99284 EMERGENCY DEPT VISIT MOD MDM: CPT

## 2024-04-01 PROCEDURE — 85610 PROTHROMBIN TIME: CPT | Performed by: EMERGENCY MEDICINE

## 2024-04-01 PROCEDURE — 86900 BLOOD TYPING SEROLOGIC ABO: CPT | Performed by: EMERGENCY MEDICINE

## 2024-04-01 PROCEDURE — 96361 HYDRATE IV INFUSION ADD-ON: CPT

## 2024-04-01 PROCEDURE — 80053 COMPREHEN METABOLIC PANEL: CPT | Performed by: EMERGENCY MEDICINE

## 2024-04-01 PROCEDURE — 71045 X-RAY EXAM CHEST 1 VIEW: CPT

## 2024-04-01 PROCEDURE — 36415 COLL VENOUS BLD VENIPUNCTURE: CPT | Performed by: EMERGENCY MEDICINE

## 2024-04-01 PROCEDURE — 86901 BLOOD TYPING SEROLOGIC RH(D): CPT | Performed by: EMERGENCY MEDICINE

## 2024-04-01 PROCEDURE — 86850 RBC ANTIBODY SCREEN: CPT | Performed by: EMERGENCY MEDICINE

## 2024-04-01 PROCEDURE — 85730 THROMBOPLASTIN TIME PARTIAL: CPT | Performed by: EMERGENCY MEDICINE

## 2024-04-01 PROCEDURE — 85025 COMPLETE CBC W/AUTO DIFF WBC: CPT | Performed by: EMERGENCY MEDICINE

## 2024-04-01 PROCEDURE — 74177 CT ABD & PELVIS W/CONTRAST: CPT

## 2024-04-01 PROCEDURE — 70450 CT HEAD/BRAIN W/O DYE: CPT

## 2024-04-01 PROCEDURE — 96374 THER/PROPH/DIAG INJ IV PUSH: CPT

## 2024-04-01 PROCEDURE — 72125 CT NECK SPINE W/O DYE: CPT

## 2024-04-01 RX ORDER — MORPHINE SULFATE 10 MG/ML
6 INJECTION, SOLUTION INTRAMUSCULAR; INTRAVENOUS ONCE
Status: COMPLETED | OUTPATIENT
Start: 2024-04-01 | End: 2024-04-01

## 2024-04-01 RX ORDER — METHOCARBAMOL 500 MG/1
500 TABLET, FILM COATED ORAL 2 TIMES DAILY
Qty: 10 TABLET | Refills: 0 | Status: SHIPPED | OUTPATIENT
Start: 2024-04-01

## 2024-04-01 RX ORDER — OXYCODONE HYDROCHLORIDE 5 MG/1
5 TABLET ORAL EVERY 4 HOURS PRN
Qty: 10 TABLET | Refills: 0 | Status: SHIPPED | OUTPATIENT
Start: 2024-04-01 | End: 2024-04-11

## 2024-04-01 RX ORDER — LIDOCAINE 50 MG/G
1 PATCH TOPICAL ONCE
Status: DISCONTINUED | OUTPATIENT
Start: 2024-04-01 | End: 2024-04-01 | Stop reason: HOSPADM

## 2024-04-01 RX ADMIN — MORPHINE SULFATE 6 MG: 10 INJECTION INTRAVENOUS at 13:18

## 2024-04-01 RX ADMIN — IOHEXOL 100 ML: 350 INJECTION, SOLUTION INTRAVENOUS at 13:05

## 2024-04-01 RX ADMIN — LIDOCAINE 1 PATCH: 50 PATCH CUTANEOUS at 14:50

## 2024-04-01 RX ADMIN — SODIUM CHLORIDE 1000 ML: 0.9 INJECTION, SOLUTION INTRAVENOUS at 12:48

## 2024-04-01 NOTE — Clinical Note
Hector Bubba was seen and treated in our emergency department on 4/1/2024.        No work until cleared by Family Doctor/Orthopedics        Diagnosis:     Hector  .    He may return on this date:          If you have any questions or concerns, please don't hesitate to call.      Ghislaine Harper, DO    ______________________________           _______________          _______________  Hospital Representative                              Date                                Time

## 2024-04-02 ENCOUNTER — VBI (OUTPATIENT)
Dept: FAMILY MEDICINE CLINIC | Facility: CLINIC | Age: 54
End: 2024-04-02

## 2024-04-02 NOTE — ED PROVIDER NOTES
Emergency Department Trauma Note  Hector Mac 54 y.o. male MRN: [id number]  Unit/Bed#: ED 25/ED 25 Encounter: [id number]      Trauma Alert: Trauma Acuity: Trauma Evaluation  Model of Arrival:   via    Trauma Team: Current Providers  Attending Provider: Ghislaine Harper DO  Registered Nurse: Jolanta Sheriff RN  Consultants:     None      History of Present Illness     Chief Complaint:   Chief Complaint   Patient presents with    Fall     Patient reports slipped on wet ground when taking out garbage last Wednesday. Patient having right side pain since. Denies blood strike, +ASA and coumadin, no loc.     HPI:  Hector Mac is a 54 y.o. male who presents with ruq abd pain x 6 days. He states that he slipped and fell on his wet deck 6 days ago. He states that he landed on his right side. Has had pain since. He denies hitting his head or loc. States that he has pain with breathing. Denies any neck pain or n/t/w of his extremities. He states that he is on aspirin and coumadin. No other complaints.   Mechanism:Details of Incident: 6n days ago fell from a standing position pt states he slipped annd fell on wet deck no loc          53 y/o male presents to the ED for ruq abd pain x 6 days. He states that he slipped and fell on his wet deck 6 days ago. He states that he landed on his right side. Has had pain since. He denies hitting his head or loc. States that he has pain with breathing. Denies any neck pain or n/t/w of his extremities. He states that he is on aspirin and coumadin. No other complaints.         History provided by:  Patient  Fall  Mechanism of injury: fall    Injury location:  Torso  Torso injury location:  Abdomen and R flank  Incident location:  Outdoors  Fall:     Fall occurred:  Tripped  Suspicion of drug use: no    Prior to arrival data:     Bystander interventions:  None    Patient ambulatory at scene: yes      Blood loss:  None    Responsiveness at scene:  Alert    Orientation at scene:   Person, place, situation and time    Loss of consciousness: no      Amnesic to event: no      Airway interventions:  None    Breathing interventions:  None    IV access status:  None    IO access:  None    Fluids administered:  None    Cardiac interventions:  None    Medications administered:  None    Immobilization:  None  Associated symptoms: abdominal pain    Associated symptoms: no back pain, no chest pain, no headaches, no loss of consciousness, no nausea, no neck pain and no vomiting    Risk factors: anticoagulation therapy      Review of Systems   Constitutional:  Negative for chills and fever.   HENT:  Negative for congestion, ear pain and sore throat.    Eyes:  Negative for pain and visual disturbance.   Respiratory:  Negative for cough, shortness of breath and wheezing.    Cardiovascular:  Negative for chest pain and leg swelling.   Gastrointestinal:  Positive for abdominal pain. Negative for diarrhea, nausea and vomiting.   Genitourinary:  Positive for flank pain. Negative for dysuria, frequency, hematuria and urgency.   Musculoskeletal:  Negative for back pain, neck pain and neck stiffness.   Skin:  Negative for rash and wound.   Neurological:  Negative for loss of consciousness, weakness, numbness and headaches.   Psychiatric/Behavioral:  Negative for agitation and confusion.    All other systems reviewed and are negative.      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 No pertinent family history.  Past Surgical History:   Procedure Laterality Date    IR LOWER EXTREMITY ANGIOGRAM   2023    THROMBECTOMY W/ EMBOLECTOMY Right 2023    Procedure: RIGHT LEG BALLOON ANGIOPLASTY, STENT, FASCIOTOMY;  Surgeon: Eli Berry MD;  Location: [facility];  Service: Vascular    WOUND DEBRIDEMENT Right 8/3/2023    Procedure: DEBRIDEMENT LOWER EXTREMITY (WASH OUT) R LEG FASCIOTOMY WOUND WASHOUT, DEBRIDEMENT, AND POSSIBLE CLOSURE VS VAC PLACEMENT;  Surgeon: Eliot Berkowitz DO;  Location: [facility];  Service: Vascular     Social History     Tobacco Use    Smoking status: Former     Current packs/day: 0.00     Average packs/day: 0.5 packs/day for 35.6 years (17.8 ttl pk-yrs)     Types: Cigarettes     Start date:      Quit date: 2023     Years since quittin.6     Passive exposure: Never    Smokeless tobacco: Never   Vaping Use    Vaping status: Never Used   Substance Use Topics    Alcohol use: Yes     Alcohol/week: 14.0 standard drinks of alcohol     Types: 14 Cans of beer per week    Drug use: No     E-Cigarette/Vaping    E-Cigarette Use Never User      E-Cigarette/Vaping Substances       Family History: non-contributory    Meds/Allergies   Prior to Admission Medications   Prescriptions Last Dose Informant Patient Reported? Taking?   DULoxetine (CYMBALTA) 60 mg delayed release capsule   No No   Sig: take 1 capsule by mouth once daily   acetaminophen (TYLENOL) 325 mg tablet  Self No No   Sig: Take 2 tablets (650 mg total) by mouth every 6 (six) hours as needed for mild pain   amLODIPine (NORVASC) 5 mg tablet  Self No No   Sig: Take 1 tablet (5 mg total) by mouth daily   aspirin (ECOTRIN LOW STRENGTH) 81 mg EC tablet   Yes No   Sig: Take 81 mg by mouth daily   atorvastatin (LIPITOR) 40 mg tablet   No No   Sig: Take 1 tablet (40 mg total) by mouth daily at bedtime   gabapentin (NEURONTIN) 300 mg capsule  Self No No   Sig: take 1 capsule by mouth three times a day   methocarbamol (ROBAXIN) 500 mg tablet   No No   Sig: Take 1 tablet (500 mg total) by mouth 4 (four) times a day   warfarin  (Coumadin) 5 mg tablet  Self No No   Sig: Take 10 mg Fri and 7.5 mg all other days. Or as directed.      Facility-Administered Medications: None       No Known Allergies    PHYSICAL EXAM    PE limited by: n/a    Objective   Vitals:   First set: Temperature: 97.8 °F (36.6 °C) (04/01/24 1154)  Pulse: 87 (04/01/24 1154)  Respirations: 18 (04/01/24 1154)  Blood Pressure: (!) 225/103 (04/01/24 1154)  SpO2: 96 % (04/01/24 1154)    Primary Survey:   (A) Airway: intact  (B) Breathing: equal breath sounds bilaterally   (C) Circulation: Pulses:   normal  (D) Disabliity:  GCS Total:  15  (E) Expose:  Completed    Secondary Survey: (Click on Physical Exam tab above)  Physical Exam  Vitals and nursing note reviewed.   Constitutional:       Appearance: He is well-developed.   HENT:      Head: Normocephalic and atraumatic.   Eyes:      Pupils: Pupils are equal, round, and reactive to light.   Cardiovascular:      Rate and Rhythm: Normal rate and regular rhythm.   Pulmonary:      Effort: Pulmonary effort is normal.      Breath sounds: Normal breath sounds.   Abdominal:      General: Bowel sounds are normal.      Palpations: Abdomen is soft.      Tenderness: There is abdominal tenderness in the right upper quadrant and epigastric area. There is right CVA tenderness.   Musculoskeletal:         General: Normal range of motion.      Cervical back: Normal range of motion and neck supple.   Skin:     General: Skin is warm and dry.   Neurological:      General: No focal deficit present.      Mental Status: He is alert and oriented to person, place, and time.      Comments: No focal deficits         Cervical spine cleared by clinical criteria? No (imaging required)      Invasive Devices       None                   Lab Results:   Results Reviewed       Procedure Component Value Units Date/Time    Comprehensive metabolic panel [[id number]] Collected: 04/01/24 1243    Lab Status: Final result Specimen: Blood from Arm, Left Updated: 04/01/24  1319     Sodium 137 mmol/L      Potassium 3.7 mmol/L      Chloride 105 mmol/L      CO2 26 mmol/L      ANION GAP 6 mmol/L      BUN 13 mg/dL      Creatinine 0.88 mg/dL      Glucose 105 mg/dL      Calcium 9.0 mg/dL      AST 16 U/L      ALT 16 U/L      Alkaline Phosphatase 72 U/L      Total Protein 7.9 g/dL      Albumin 4.2 g/dL      Total Bilirubin 0.29 mg/dL      eGFR 96 ml/min/1.73sq m     Narrative:      National Kidney Disease Foundation guidelines for Chronic Kidney Disease (CKD):     Stage 1 with normal or high GFR (GFR > 90 mL/min/1.73 square meters)    Stage 2 Mild CKD (GFR = 60-89 mL/min/1.73 square meters)    Stage 3A Moderate CKD (GFR = 45-59 mL/min/1.73 square meters)    Stage 3B Moderate CKD (GFR = 30-44 mL/min/1.73 square meters)    Stage 4 Severe CKD (GFR = 15-29 mL/min/1.73 square meters)    Stage 5 End Stage CKD (GFR <15 mL/min/1.73 square meters)  Note: GFR calculation is accurate only with a steady state creatinine    Protime-INR [[id number]]  (Abnormal) Collected: 04/01/24 1243    Lab Status: Final result Specimen: Blood from Arm, Left Updated: 04/01/24 1316     Protime 23.3 seconds      INR 1.98    APTT [[id number]]  (Normal) Collected: 04/01/24 1243    Lab Status: Final result Specimen: Blood from Arm, Left Updated: 04/01/24 1316     PTT 36 seconds     CBC and differential [[id number]]  (Abnormal) Collected: 04/01/24 1243    Lab Status: Final result Specimen: Blood from Arm, Left Updated: 04/01/24 1252     WBC 12.29 Thousand/uL      RBC 5.23 Million/uL      Hemoglobin 11.4 g/dL      Hematocrit 36.7 %      MCV 70 fL      MCH 21.8 pg      MCHC 31.1 g/dL      RDW 17.1 %      MPV 8.4 fL      Platelets 551 Thousands/uL      nRBC 0 /100 WBCs      Neutrophils Relative 73 %      Immature Grans % 0 %      Lymphocytes Relative 18 %      Monocytes Relative 7 %      Eosinophils Relative 1 %      Basophils Relative 1 %      Neutrophils Absolute 9.06 Thousands/µL      Absolute Immature Grans 0.03 Thousand/uL       Absolute Lymphocytes 2.15 Thousands/µL      Absolute Monocytes 0.80 Thousand/µL      Eosinophils Absolute 0.17 Thousand/µL      Basophils Absolute 0.08 Thousands/µL                    Imaging Studies:   Direct to CT: Yes  TRAUMA - CT head wo contrast   Final Result by Rodrigo Mendieta MD (04/01 1322)      No acute intracranial abnormality.      The study was marked in EPIC for immediate notification.            Workstation performed: [id number]         TRAUMA - CT spine cervical wo contrast   Final Result by Rodrigo Mendieta MD (04/01 1327)      No cervical spine fracture or traumatic malalignment.      The study was marked in EPIC for immediate notification.            Workstation performed: [id number]         TRAUMA - CT chest abdomen pelvis w contrast   Final Result by Rodrigo Mendieta MD (04/01 1346)      Acute fractures of the right 10th rib as well as the right transverse processes of the L2 and L3 vertebral bodies. Otherwise, no evidence of solid organ injury.      I personally discussed this study with CARLOS CHUA on 4/1/2024 1:42 PM.               Workstation performed: [id number]         XR chest 1 view portable   Final Result by Ariel Vieyra MD (04/01 9295)      Limited inspiration with bibasilar atelectasis and some elevation of the right hemidiaphragm.            Workstation performed: [id number]               Procedures  Procedures         ED Course  ED Course as of 04/01/24 2051 Mon Apr 01, 2024   1236 Trauma eval called immediately upon my eval            Medical Decision Making  55 y/o male presents to the ED for flank and abd pain s/p fall 6 days ago. Will reassess.     Amount and/or Complexity of Data Reviewed  Labs: ordered.  Radiology: ordered.    Risk  Prescription drug management.                Disposition  [facility] Transfer: No  Final diagnoses:   Fall   Closed rib fracture   Lumbar transverse process fracture (HCC)     Time reflects when diagnosis was  documented in both MDM as applicable and the Disposition within this note       Time User Action Codes Description Comment    4/1/2024  2:35 PM Ghislaine Harper Add [W19.XXXA] Fall     4/1/2024  2:35 PM Ghislaine Harper Add [S22.39XA] Closed rib fracture     4/1/2024  2:36 PM Ghislaine Harper Add [S32.009A] Lumbar transverse process fracture (HCC)           ED Disposition       ED Disposition   Discharge    Condition   Stable    Date/Time   Mon Apr 1, 2024  2:35 PM    Comment   Hector Mac discharge to home/self care.                   Follow-up Information       Follow up With Specialties Details Why Contact Info Additional Information    Keely Ramirez DO Family Medicine Call in 1 day for follow up within 2-3 days [address] [facility] [address]  [phone]       [facility] Call in 1 day for follow up as soon as possible [address] [facility] [address]  [facility] [address] [phone] [facility] [address] [facility] [address], [place], [place], [address] [phone]    Replaced by [facility] Emergency Medicine Go to  immediately for any new or worsening symptoms [address] [facility] [address] [phone] Replaced by [facility], [address] [place], [place], [address]          Discharge Medication List as of 4/1/2024  2:39 PM        START taking these medications    Details   !! methocarbamol (ROBAXIN) 500 mg tablet Take 1 tablet (500 mg total) by mouth 2 (two) times a day, Starting Mon 4/1/2024, Normal      oxyCODONE (Roxicodone) 5 immediate release tablet Take 1 tablet (5 mg total) by mouth every 4 (four) hours as needed for moderate pain or severe pain for up to 10 days Max Daily Amount: 30 mg, Starting Mon 4/1/2024, Until Thu 4/11/2024 at 2359, Normal       !! - Potential  duplicate medications found. Please discuss with provider.        CONTINUE these medications which have NOT CHANGED    Details   acetaminophen (TYLENOL) 325 mg tablet Take 2 tablets (650 mg total) by mouth every 6 (six) hours as needed for mild pain, Starting Sat 8/5/2023, OTC      amLODIPine (NORVASC) 5 mg tablet Take 1 tablet (5 mg total) by mouth daily, Starting Mon 8/28/2023, Until Tue 3/5/2024, Normal      aspirin (ECOTRIN LOW STRENGTH) 81 mg EC tablet Take 81 mg by mouth daily, Historical Med      atorvastatin (LIPITOR) 40 mg tablet Take 1 tablet (40 mg total) by mouth daily at bedtime, Starting Mon 3/25/2024, Normal      DULoxetine (CYMBALTA) 60 mg delayed release capsule take 1 capsule by mouth once daily, Starting Fri 3/22/2024, Normal      gabapentin (NEURONTIN) 300 mg capsule take 1 capsule by mouth three times a day, Starting Mon 2/12/2024, Normal      !! methocarbamol (ROBAXIN) 500 mg tablet Take 1 tablet (500 mg total) by mouth 4 (four) times a day, Starting Mon 3/18/2024, Normal      warfarin (Coumadin) 5 mg tablet Take 10 mg Fri and 7.5 mg all other days. Or as directed., Normal       !! - Potential duplicate medications found. Please discuss with provider.        No discharge procedures on file.    PDMP Review         Value Time User    PDMP Reviewed  Yes 8/7/2023  2:28 PM Keely Ramirez DO            ED Provider  Electronically Signed by           Ghislaine Harper DO  04/01/24 2051

## 2024-04-02 NOTE — TELEPHONE ENCOUNTER
04/02/24 10:04 AM    Patient contacted post ED visit, [facility] department spoke with patient/caregiver and outreach was successful.    Thank you.  Vanessa Jennings  [facility]

## 2024-04-05 ENCOUNTER — OFFICE VISIT (OUTPATIENT)
Dept: FAMILY MEDICINE CLINIC | Facility: CLINIC | Age: 54
End: 2024-04-05
Payer: COMMERCIAL

## 2024-04-05 VITALS
HEART RATE: 77 BPM | WEIGHT: 172 LBS | HEIGHT: 69 IN | BODY MASS INDEX: 25.48 KG/M2 | SYSTOLIC BLOOD PRESSURE: 144 MMHG | DIASTOLIC BLOOD PRESSURE: 70 MMHG | TEMPERATURE: 98.6 F | RESPIRATION RATE: 14 BRPM

## 2024-04-05 DIAGNOSIS — S22.31XD CLOSED FRACTURE OF ONE RIB OF RIGHT SIDE WITH ROUTINE HEALING, SUBSEQUENT ENCOUNTER: ICD-10-CM

## 2024-04-05 DIAGNOSIS — S32.009D CLOSED FRACTURE OF TRANSVERSE PROCESS OF LUMBAR VERTEBRA WITH ROUTINE HEALING, SUBSEQUENT ENCOUNTER: Primary | ICD-10-CM

## 2024-04-05 PROBLEM — S32.009A CLOSED FRACTURE OF TRANSVERSE PROCESS OF LUMBAR VERTEBRA (HCC): Status: ACTIVE | Noted: 2024-04-05

## 2024-04-05 PROCEDURE — 99214 OFFICE O/P EST MOD 30 MIN: CPT | Performed by: STUDENT IN AN ORGANIZED HEALTH CARE EDUCATION/TRAINING PROGRAM

## 2024-04-05 RX ORDER — HYDROCODONE BITARTRATE AND ACETAMINOPHEN 5; 325 MG/1; MG/1
2 TABLET ORAL EVERY 6 HOURS PRN
Qty: 90 TABLET | Refills: 0 | Status: SHIPPED | OUTPATIENT
Start: 2024-04-05

## 2024-04-05 NOTE — PROGRESS NOTES
Name: Hector Mac      : 1970      MRN: [id number]  Encounter Provider: Keely Ramirez DO  Encounter Date: 2024   Encounter department: [facility]    Assessment & Plan     1. Closed fracture of transverse process of lumbar vertebra with routine healing, subsequent encounter  Assessment & Plan:  Has appt with Ortho upcoming  Patient inquiring about returning to work as he does work a very physically demanding job  Will await clearance from Ortho before returning to work.    Orders:  -     HYDROcodone-acetaminophen (Norco) 5-325 mg per tablet; Take 2 tablets by mouth every 6 (six) hours as needed for pain Max Daily Amount: 8 tablets    2. Closed fracture of one rib of right side with routine healing, subsequent encounter  Assessment & Plan:  Recommend abdominal binder as tolerated  Will discontinue Oxy and start Norco 5-325mg q 6hours  Incentive spirometer given at visit today     Orders:  -     HYDROcodone-acetaminophen (Norco) 5-325 mg per tablet; Take 2 tablets by mouth every 6 (six) hours as needed for pain Max Daily Amount: 8 tablets         Subjective      Patient presents today for ED f/u  States that he feel on his wet deck last week. Patient reports falling on to his right side. Has been having increasing pain which prompted his ED visit. Review of ED records shows CT abd/pelvis revealing a right 10th rib fracture as well as fracture of the right transverse processes of L2 and L3. CT Head and Cervical spine negative for any fracture or dislocation. Patient today reports that his pain is not well controlled. Has to sleep sitting up. States that also he has been bracing with cough and deep breathing.      Review of Systems   Constitutional:  Negative for chills and fever.   Respiratory:  Negative for cough and shortness of breath.    Cardiovascular:  Negative for chest pain and palpitations.   Musculoskeletal:         Right rib pain and back pain  "  Neurological:  Negative for dizziness and headaches.   Psychiatric/Behavioral:  Positive for sleep disturbance.        Current Outpatient Medications on File Prior to Visit   Medication Sig   • acetaminophen (TYLENOL) 325 mg tablet Take 2 tablets (650 mg total) by mouth every 6 (six) hours as needed for mild pain   • aspirin (ECOTRIN LOW STRENGTH) 81 mg EC tablet Take 81 mg by mouth daily   • atorvastatin (LIPITOR) 40 mg tablet Take 1 tablet (40 mg total) by mouth daily at bedtime   • DULoxetine (CYMBALTA) 60 mg delayed release capsule take 1 capsule by mouth once daily   • gabapentin (NEURONTIN) 300 mg capsule take 1 capsule by mouth three times a day   • methocarbamol (ROBAXIN) 500 mg tablet Take 1 tablet (500 mg total) by mouth 4 (four) times a day   • oxyCODONE (Roxicodone) 5 immediate release tablet Take 1 tablet (5 mg total) by mouth every 4 (four) hours as needed for moderate pain or severe pain for up to 10 days Max Daily Amount: 30 mg   • amLODIPine (NORVASC) 5 mg tablet Take 1 tablet (5 mg total) by mouth daily   • methocarbamol (ROBAXIN) 500 mg tablet Take 1 tablet (500 mg total) by mouth 2 (two) times a day   • warfarin (Coumadin) 5 mg tablet Take 10 mg Fri and 7.5 mg all other days. Or as directed.       Objective     /70   Pulse 77   Temp 98.6 °F (37 °C) (Temporal)   Resp 14   Ht 5' 9\" (1.753 m)   Wt 78 kg (172 lb)   BMI 25.40 kg/m²     Physical Exam  Vitals reviewed.   Constitutional:       Appearance: Normal appearance.   HENT:      Head: Normocephalic and atraumatic.      Mouth/Throat:      Mouth: Mucous membranes are moist.      Pharynx: No oropharyngeal exudate or posterior oropharyngeal erythema.   Eyes:      Extraocular Movements: Extraocular movements intact.   Cardiovascular:      Rate and Rhythm: Normal rate and regular rhythm.      Heart sounds: Normal heart sounds.   Pulmonary:      Effort: Pulmonary effort is normal.      Breath sounds: Normal breath sounds. "   Musculoskeletal:         General: Tenderness present. No swelling or deformity.      Comments: Right CVA tenderness, TTP and diffuse muscle spasm in right LL spine   Neurological:      General: No focal deficit present.      Mental Status: He is alert and oriented to person, place, and time.   Psychiatric:         Mood and Affect: Mood normal.         Behavior: Behavior normal.       Keely Ramirez, DO

## 2024-04-05 NOTE — ASSESSMENT & PLAN NOTE
Has appt with Ortho upcoming  Patient inquiring about returning to work as he does work a very physically demanding job  Will await clearance from Ortho before returning to work.

## 2024-04-05 NOTE — ASSESSMENT & PLAN NOTE
Recommend abdominal binder as tolerated  Will discontinue Oxy and start Norco 5-325mg q 6hours  Incentive spirometer given at visit today

## 2024-04-06 ENCOUNTER — OFFICE VISIT (OUTPATIENT)
Dept: OBGYN CLINIC | Facility: CLINIC | Age: 54
End: 2024-04-06
Payer: COMMERCIAL

## 2024-04-06 VITALS
RESPIRATION RATE: 18 BRPM | HEIGHT: 69 IN | OXYGEN SATURATION: 98 % | DIASTOLIC BLOOD PRESSURE: 73 MMHG | HEART RATE: 74 BPM | WEIGHT: 171 LBS | TEMPERATURE: 98 F | BODY MASS INDEX: 25.33 KG/M2 | SYSTOLIC BLOOD PRESSURE: 129 MMHG

## 2024-04-06 DIAGNOSIS — S32.009A CLOSED FRACTURE OF TRANSVERSE PROCESS OF LUMBAR VERTEBRA, INITIAL ENCOUNTER (HCC): Primary | ICD-10-CM

## 2024-04-06 DIAGNOSIS — S22.31XD CLOSED FRACTURE OF ONE RIB OF RIGHT SIDE WITH ROUTINE HEALING, SUBSEQUENT ENCOUNTER: ICD-10-CM

## 2024-04-06 PROCEDURE — 99204 OFFICE O/P NEW MOD 45 MIN: CPT | Performed by: FAMILY MEDICINE

## 2024-04-06 NOTE — LETTER
April 6, 2024     Patient: Hector Mac  YOB: 1970  Date of Visit: 4/6/2024      To Whom it May Concern:    Hector Mac is under my professional care. Hector was seen in my office on 4/6/2024. Patient is currently being seen for right rib fracture and right transverse process fracture. 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 If unable to accommodate would recommend keeping patient out of work. We will follow up in 4 weeks    If you have any questions or concerns, please don't hesitate to call.         Sincerely,          Bijan Osuna DO        CC: No Recipients

## 2024-04-06 NOTE — PROGRESS NOTES
Assessment/Plan:    No problem-specific Assessment & Plan notes found for this encounter.       Diagnoses and all orders for this visit:    Closed fracture of transverse process of lumbar vertebra, initial encounter ([facility])    Closed fracture of one rib of right side with routine healing, subsequent encounter     54-year-old male patient presenting today for initial evaluation consultation for closed fracture of the transverse process of the L2-L3 vertebrae along with a nondisplaced fracture of the 10th rib on the right side.  The patient has been continuing with pain symptoms predominantly with deep inspiration and positional changing.  We discussed that typically can take upwards to 6 to 8 weeks for healing of fracture.  Patient was encouraged to begin mobilization of the lumbar spine as tolerated.  In regards to work I advised avoidance of heavy lifting pushing or pulling and I would like the patient to continue on sedentary work duty if tolerated.  If unable to accommodate he will remain out of work until reevaluation in 4 weeks.  For pain relief I advised patient to continue with Tylenol medication and apply lidocaine patches to the affected area.  He will continue with incentive spirometer, and continue with icing of the affected area.  Return in 4 weeks    Subjective:      Patient ID: Hector Mac is a 54 y.o. male presenting today for initial evaluation of lower back pain.  Patient had sustained a fall onto his right side approximately 1 week ago and was seen in the emergency room where radiographs were obtained.  CT scan imaging revealed a fracture of the 10th rib on the right side along with transverse process fracture of the L2 and L3 vertebrae.  The patient states that he has been continuing to have pain symptoms predominantly with taking deep inspirations.  Pain is localized over the lateral aspect of the right chest and right side of the lumbar spine.  He denies any bowel or bladder incontinence, he  "denies any numbness or tingling in the lower extremity and denies any weakness    HPI    The following portions of the patient's history were reviewed and updated as appropriate: allergies, current medications, past family history, past medical history, past social history, past surgical history, and problem list.    Review of Systems      Objective:      /73   Pulse 74   Temp 98 °F (36.7 °C)   Resp 18   Ht 5' 9\" (1.753 m)   Wt 77.6 kg (171 lb)   SpO2 98%   BMI 25.25 kg/m²          Physical Exam  Constitutional:       General: He is in acute distress.      Appearance: He is not ill-appearing or toxic-appearing.   Eyes:      Extraocular Movements: Extraocular movements intact.      Pupils: Pupils are equal, round, and reactive to light.   Musculoskeletal:      Lumbar back: Tenderness and bony tenderness present. No deformity. Decreased range of motion. Positive left straight leg raise test. Negative right straight leg raise test.      Comments: +right lateral chest wall pain, ttp rib 9-10  5/5 L1-L5   Neurological:      General: No focal deficit present.      Mental Status: He is alert and oriented to person, place, and time.           "

## 2024-04-10 ENCOUNTER — ANTICOAG VISIT (OUTPATIENT)
Dept: CARDIOLOGY CLINIC | Facility: CLINIC | Age: 54
End: 2024-04-10

## 2024-04-10 DIAGNOSIS — I74.4 EMBOLISM AND THROMBOSIS OF ARTERIES OF EXTREMITIES (HCC): Primary | ICD-10-CM

## 2024-04-10 DIAGNOSIS — I70.90 ARTERIAL OCCLUSION: ICD-10-CM

## 2024-04-10 DIAGNOSIS — M79.604 PAIN IN RIGHT LEG: ICD-10-CM

## 2024-04-10 RX ORDER — WARFARIN SODIUM 5 MG/1
TABLET ORAL
Qty: 132 TABLET | Refills: 1 | Status: SHIPPED | OUTPATIENT
Start: 2024-04-10

## 2024-04-10 NOTE — TELEPHONE ENCOUNTER
Contacted patient's wife Maria Luisa and informed her of importance to ensure that he obtains his INRs as recommended.  Informed her the next time he should get his INR checked is 4/15/24.  She requested that we contact her as she does all the communicating for patient.  Patient was in agreement with this and was in the background during call.  This was changed in patient's demographics as requested.  Also informed her that a refill for his Coumadin was sent to his pharmacy.  She stated that patient has been taking his Coumadin and he has been taking 1 1/2 tablets daily.  She gave a verbal understanding as to when he needs to have his INRs checked.  Informed her that we will reach out to her with INR result for dosing adjustments.  She was agreeable to same.

## 2024-04-10 NOTE — TELEPHONE ENCOUNTER
Contacted patient and left him a message to inform him a refill was sent to his pharmacy for his Coumadin.  Instructed him to contact office with questions.

## 2024-04-17 ENCOUNTER — OFFICE VISIT (OUTPATIENT)
Dept: HEMATOLOGY ONCOLOGY | Facility: CLINIC | Age: 54
End: 2024-04-17
Payer: COMMERCIAL

## 2024-04-17 ENCOUNTER — APPOINTMENT (OUTPATIENT)
Dept: LAB | Facility: HOSPITAL | Age: 54
End: 2024-04-17
Payer: COMMERCIAL

## 2024-04-17 ENCOUNTER — TELEPHONE (OUTPATIENT)
Age: 54
End: 2024-04-17

## 2024-04-17 ENCOUNTER — ANTICOAG VISIT (OUTPATIENT)
Dept: CARDIOLOGY CLINIC | Facility: CLINIC | Age: 54
End: 2024-04-17

## 2024-04-17 VITALS
RESPIRATION RATE: 16 BRPM | DIASTOLIC BLOOD PRESSURE: 70 MMHG | WEIGHT: 177 LBS | HEIGHT: 69 IN | OXYGEN SATURATION: 97 % | BODY MASS INDEX: 26.22 KG/M2 | TEMPERATURE: 97.6 F | SYSTOLIC BLOOD PRESSURE: 142 MMHG | HEART RATE: 82 BPM

## 2024-04-17 DIAGNOSIS — I74.4 EMBOLISM AND THROMBOSIS OF ARTERIES OF EXTREMITIES (HCC): Primary | ICD-10-CM

## 2024-04-17 DIAGNOSIS — I74.4 EMBOLISM AND THROMBOSIS OF ARTERIES OF EXTREMITIES (HCC): ICD-10-CM

## 2024-04-17 DIAGNOSIS — K86.9 PANCREATIC LESION: Primary | ICD-10-CM

## 2024-04-17 LAB
INR PPP: 2.11 (ref 0.84–1.19)
PROTHROMBIN TIME: 24.5 SECONDS (ref 11.6–14.5)

## 2024-04-17 PROCEDURE — 36415 COLL VENOUS BLD VENIPUNCTURE: CPT

## 2024-04-17 PROCEDURE — 85610 PROTHROMBIN TIME: CPT

## 2024-04-17 PROCEDURE — 99214 OFFICE O/P EST MOD 30 MIN: CPT | Performed by: INTERNAL MEDICINE

## 2024-04-17 NOTE — TELEPHONE ENCOUNTER
Pt called while at Lab to get bloodwork for PT/INR, pt was told they do not have an order for testing, tried to connect to coumadin clinic per protocol but was unable to connect and call was dropped. Pleas call pt to advise

## 2024-04-17 NOTE — PROGRESS NOTES
Hematology/Oncology Outpatient Follow- up Note  Hector Mac 54 y.o. male MRN: @ Encounter: [id number]        Date:  4/17/2024        Assessment / Plan:    1 chronic thrombus distal SFA  2 pancreatic tail mass not seen on present scans of 4/1/2024  3 right adrenal mass present scan shows a 2.4 cm complex mass unchanged in the right kidney.  Plan: Patient will come back in 6 months.  I will speak with surgical oncology who has seen him previously.  I repeat his CAT scan abdomen pelvis with attention to pancreatic protocol.  At this point there is nothing to act on that I can see on his present scans.  Discussed with patient aware of the same.  Please note he has had multiple fractures after fall.  These are in the ribs and lower back.          HPI: 54-year-old here for follow-up with a history of chronic PE or thrombus.  He is on anticoagulant.  He had a pancreatic tail mass and a right adrenal nodule.  They are not well-seen on scans at this time.  They were done in beginning of April.  He had multiple testing which did not show any evidence of any abnormalities.  5 HIAA was not done.  There was a question of liver abnormality again not seen on the scan.          Interval History: Note from 12/19/2023:  PRIMARY HEMATOLOGIC/ONCOLOGIC DIAGNOSIS:  1. Chronic thrombus in distal SFA  2. 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 Right adrenal nodule  4. Multiple subcentimeter foci of hyperenhancement also seen in the liver which are compatible with vascular shunting, however metastatic lesions would be a consideration in the context of suspected pancreatic neoplasm.  HISTORY OF PRESENT ILLNESS:  Hector Mac is a 54yo male who presents for management of arterial thrombosis. On 7/17/23 the patient was working when his RLE became numb from his knee down. He was diagnosed with sciatica at [facility]. He then followed up with his PCP who initiated the work-up for PVD. While undergoing  work-up the patient presented to the ED with RLE pain on 7/28/23. Vascular lower limb arterial duplex was c/w acute occlusion of the mid and distal superficial femoral artery. There was an occlusion of the distal anterior tibial artery. Abdominal CTA w/ runoff c/w focal mid to distal right SFA occlusion with short interval reconstitution. A 2.7 mm arterially enhancing lesion within the tail of the pancreas was noted. Cystic lesion within the right kidney measuring up to 2.7 cm, demonstrating mural calcifications. Right adrenal nodule measuring up to 1.5 cm. The patient was evaluated by surgical oncology and MRI abdomen was ordered-- report not available yet. CTA 7/31/23 showed numerous groundglass opacities throughout the upper lobes concerning for atypical infectious process. He denies any infectious symptoms. Redemonstration of a hyperenhancing 7 mm lesion in the pancreatic tail suggestive of a pancreatic neuroendocrine tumor. There are multiple subcentimeter foci of hyperenhancement also seen in the liver which are compatible with vascular shunting, however metastatic lesions would be a consideration in the context of suspected pancreatic neoplasm. Right adrenal nodule measuring up to 1.5 cm. Its imaging features were diagnostic of benign adrenal adenoma, and did not need further follow-up or work-up.  The patient is status post right SFA cutdown, balloon angioplasty, stenting, right leg fasciotomy on 7/29/23. He is s/p additional washout and fasciotomy closure on 8/3/23. He follows with vascular surgery. He is on Coumadin.   The patient used to smoke 1ppd. He quit smoking 8/2023 after his hospital admission.   ASSESSMENT/PLAN:  1. 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 On anticoagulation with Coumadin. Follows with vascular surgery. Llaboratory work-up unrevealing.   2. 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 Endoscopic ultrasound could be considered for further evaluation, though location in the far tail may make characterization difficult. Follows with surgical oncology. Will repeat MRI in 3m. Chromogranin. 5 HIAA pending.Chromogranin A normal at 39.2, CA 19-9 normal at 6.     MRI abdomen 12/12/23--unremarkable pancreas. The previously questioned hyperenhancing lesion in the tail is inconspicuous on this and prior MRI. Since the lesion was identified on CT, consider pancreatic protocol CT for final rule in or rule out of a space-occupying lesion. Unchanged 7 mm segment 4B hepatic lesion with typical features of a hemangioma. Other previously measured foci of restricted diffusion are not outlined/measurable, without differential intensity or enhancement in those regions, going against true space-occupying lesions. CT abdomen ordered.   3. Enhancing Bosniak 3 right renal cyst, unchanged in size. Urology consult placed.  3. Right adrenal nodule. Likely benign.   4. Multiple subcentimeter foci of hyperenhancement also seen in the liver which are compatible with vascular shunting, however metastatic lesions would be a consideration in the context of suspected pancreatic neoplasm. MRI--7 mm T2 hyperintense, enhancing lesion in segment IVB. Even though marked T2 hyperintensity and centripetal fill of contrast are present, thin complete ring enhancement on early phase is atypical for hemangioma. 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 Additionally, there is a DWI-only focus in segment VI and focus of hyperenhancement in segment II that are also potentially concerning for metastases. Repeat MRI as above--unchanged 7 mm segment 4B hepatic lesion with typical features of a hemangioma. Other previously measured foci of restricted diffusion are not outlined/measurable, without differential intensity or enhancement in those regions,  going against true space-occupying lesions.   5. Leukocytosis, anemia and thrombocytosis. Jak2 mutational analysis w/ reflexing cascade pending.    6. Smoking hx-- 1ppd since age 18, quit several m ago. CT lung screen ordered   Follow-up in 4 weeks.   Cancer Staging:  Cancer Staging   No matching staging information was found for the patient.      Molecular Testing:     Previous Hematologic/ Oncologic History:    Oncology History    No history exists.       Current Hematologic/ Oncologic Treatment:       Cycle 1         Test Results:    Imaging: XR chest 1 view portable    Result Date: 4/1/2024  Narrative: XR CHEST PORTABLE INDICATION: pain/ trauma. COMPARISON: 3/18/2024 FINDINGS: Limited inspiration with some elevation of right hemidiaphragm. There appears to be some bibasilar atelectasis. No pneumothorax or pleural effusion. Normal cardiomediastinal silhouette. Degenerative arthritis at the acromioclavicular joints. No displaced fractures are visible. Normal upper abdomen.     Impression: Limited inspiration with bibasilar atelectasis and some elevation of the right hemidiaphragm. Workstation performed: [id number]     TRAUMA - CT chest abdomen pelvis w contrast    Result Date: 4/1/2024  Narrative: CT CHEST, ABDOMEN AND PELVIS WITH IV CONTRAST INDICATION: TRAUMA. COMPARISON: Chest CT and abdomen CT from 3/18/2024 TECHNIQUE: CT examination of the chest, abdomen and pelvis was performed. Multiplanar 2D reformatted images were created from the source data. 3D reconstructions of the bony thorax were performed in order to improve sensitivity of evaluation for rib fractures. This examination, like all CT scans performed in the [facility], was performed utilizing techniques to minimize radiation dose exposure, including the use of iterative reconstruction and automated exposure control. Radiation dose length product (DLP) for this visit: 646 mGy-cm IV Contrast: 100 mL of iohexol (OMNIPAQUE) Enteric  Contrast: Not administered. Lack of oral contrast limits the sensitivity for pathology within the bowel. FINDINGS: CHEST LUNGS: Bibasilar subsegmental atelectasis. Tracheal secretions. PLEURA: Unremarkable. HEART/GREAT VESSELS: Heart is unremarkable for patient's age. No thoracic aortic aneurysm. MEDIASTINUM AND MELODIE: Unremarkable. CHEST WALL AND LOWER NECK: There is an acute posterior right 10th rib fracture. ABDOMEN LIVER/BILIARY TREE: Stable small 7 mm enhancing lesion in the inferior liver (2/112). No evidence of liver laceration. GALLBLADDER: No calcified gallstones. No pericholecystic inflammatory change. SPLEEN: Unremarkable. PANCREAS: Unremarkable. ADRENAL GLANDS: Stable right adrenal cyst. KIDNEYS/URETERS: Stable 2.4 cm complex cystic right renal lesion (2/119). No evidence of renal laceration. No hydronephrosis. STOMACH AND BOWEL: Unremarkable. APPENDIX: No findings to suggest appendicitis. ABDOMINOPELVIC CAVITY: No ascites. No pneumoperitoneum. No lymphadenopathy. VESSELS: Atherosclerosis without abdominal aortic aneurysm. PELVIS REPRODUCTIVE ORGANS: Unremarkable for patient's age. URINARY BLADDER: Unremarkable. ABDOMINAL WALL/INGUINAL REGIONS: Unremarkable. BONES: Right transverse process fractures of L2 and L3.     Impression: Acute fractures of the right 10th rib as well as the right transverse processes of the L2 and L3 vertebral bodies. Otherwise, no evidence of solid organ injury. I personally discussed this study with CARLOS CHUA on 4/1/2024 1:42 PM. Workstation performed: [id number]     TRAUMA - CT spine cervical wo contrast    Result Date: 4/1/2024  Narrative: CT CERVICAL SPINE - WITHOUT CONTRAST INDICATION:   TRAUMA. COMPARISON:  None. TECHNIQUE:  CT examination of the cervical spine was performed without intravenous contrast.  Contiguous axial images were obtained. Multiplanar 2D reformatted images were created from the source data. Radiation dose length product (DLP) for this visit:  360  mGy-cm .  This examination, like all CT scans performed in the [facility], was performed utilizing techniques to minimize radiation dose exposure, including the use of iterative reconstruction and automated exposure control. IMAGE QUALITY:  Diagnostic. FINDINGS: ALIGNMENT:  Normal alignment of the cervical spine. No subluxation. VERTEBRAE:  No fracture. DEGENERATIVE CHANGES:  Mild multilevel cervical degenerative changes are noted without critical central canal stenosis. PREVERTEBRAL AND PARASPINAL SOFT TISSUES: Unremarkable THORACIC INLET:  Normal.     Impression: No cervical spine fracture or traumatic malalignment. The study was marked in EPIC for immediate notification. Workstation performed: [id number]     TRAUMA - CT head wo contrast    Result Date: 4/1/2024  Narrative: CT BRAIN - WITHOUT CONTRAST INDICATION:   TRAUMA. COMPARISON:  None. TECHNIQUE:  CT examination of the brain was performed.  Multiplanar 2D reformatted images were created from the source data. Radiation dose length product (DLP) for this visit:  879 mGy-cm .  This examination, like all CT scans performed in the [facility], was performed utilizing techniques to minimize radiation dose exposure, including the use of iterative reconstruction and automated exposure control. IMAGE QUALITY:  Diagnostic. FINDINGS: PARENCHYMA:  No intracranial mass, mass effect or midline shift. No CT signs of acute infarction.  No acute parenchymal hemorrhage. VENTRICLES AND EXTRA-AXIAL SPACES:  Normal for the patient's age. VISUALIZED ORBITS: Normal visualized orbits. PARANASAL SINUSES: Mild mucosal thickening of the visualized paranasal sinuses. CALVARIUM AND EXTRACRANIAL SOFT TISSUES:  Normal.     Impression: No acute intracranial abnormality. The study was marked in EPIC for immediate notification. Workstation performed: [id number]             Labs:   Lab Results   Component Value Date    WBC 12.29 (H) 04/01/2024    HGB 11.4  "(L) 04/01/2024    HCT 36.7 04/01/2024    MCV 70 (L) 04/01/2024     (H) 04/01/2024     Lab Results   Component Value Date    K 3.7 04/01/2024     04/01/2024    CO2 26 04/01/2024    BUN 13 04/01/2024    CREATININE 0.88 04/01/2024    GLUF 102 (H) 03/05/2024    CALCIUM 9.0 04/01/2024    CORRECTEDCA 9.2 07/31/2023    AST 16 04/01/2024    ALT 16 04/01/2024    ALKPHOS 72 04/01/2024    EGFR 96 04/01/2024         No results found for: \"SPEP\", \"UPEP\"    Lab Results   Component Value Date    PSA 0.5 10/20/2018       No results found for: \"CEA\"    No results found for: \"\"    No results found for: \"AFP\"    Lab Results   Component Value Date    IRON 70 08/28/2023    TIBC 410 08/28/2023    FERRITIN 78 08/28/2023       Lab Results   Component Value Date    [id number] 388 08/28/2023         ROS: Review of Systems   Constitutional: Negative.    HENT: Negative.     Eyes: Negative.    Respiratory: Negative.     Cardiovascular: Negative.    Gastrointestinal: Negative.    Endocrine: Negative.    Genitourinary: Negative.    Musculoskeletal: Negative.    Skin: Negative.    Allergic/Immunologic: Negative.    Neurological: Negative.    Hematological: Negative.      Has lower back and rib pain and areas of fracture after fall    Current Medications: Reviewed  Allergies: Reviewed  PMH/FH/SH:  Reviewed      Physical Exam:    Body surface area is 1.96 meters squared.    Wt Readings from Last 3 Encounters:   04/17/24 80.3 kg (177 lb)   04/06/24 77.6 kg (171 lb)   04/05/24 78 kg (172 lb)        Temp Readings from Last 3 Encounters:   04/17/24 97.6 °F (36.4 °C) (Temporal)   04/06/24 98 °F (36.7 °C)   04/05/24 98.6 °F (37 °C) (Temporal)        BP Readings from Last 3 Encounters:   04/17/24 142/70   04/06/24 129/73   04/05/24 144/70         Pulse Readings from Last 3 Encounters:   04/17/24 82   04/06/24 74   04/05/24 77     @LASTSAO2(3)@      Physical Exam  Constitutional:       Appearance: Normal appearance. He is normal weight. "   HENT:      Head: Normocephalic and atraumatic.   Eyes:      Extraocular Movements: Extraocular movements intact.      Conjunctiva/sclera: Conjunctivae normal.      Pupils: Pupils are equal, round, and reactive to light.   Cardiovascular:      Rate and Rhythm: Normal rate and regular rhythm.      Heart sounds: Normal heart sounds.   Pulmonary:      Effort: Pulmonary effort is normal.      Breath sounds: Normal breath sounds.   Abdominal:      General: Abdomen is flat. Bowel sounds are normal.      Palpations: Abdomen is soft.   Musculoskeletal:         General: Normal range of motion.      Cervical back: Normal range of motion and neck supple.   Skin:     General: Skin is warm and dry.   Neurological:      General: No focal deficit present.      Mental Status: He is oriented to person, place, and time. Mental status is at baseline.           Goals and Barriers:  Current Goal: Prolong Survival from Cancer.   Barriers: None.      Patient's Capacity to Self Care:  Patient is able to self care.    Code Status: [unfilled]  Advance Directive and Living Will:      Power of :

## 2024-04-17 NOTE — TELEPHONE ENCOUNTER
A call was transferred to me, spoke with pts wife while pt was at the lab. This was addressed.  NFA required

## 2024-04-24 DIAGNOSIS — M79.604 PAIN IN RIGHT LEG: ICD-10-CM

## 2024-04-24 DIAGNOSIS — M79.2 NEUROPATHIC PAIN OF RIGHT FOOT: ICD-10-CM

## 2024-04-26 RX ORDER — DULOXETIN HYDROCHLORIDE 60 MG/1
60 CAPSULE, DELAYED RELEASE ORAL DAILY
Qty: 30 CAPSULE | Refills: 5 | Status: SHIPPED | OUTPATIENT
Start: 2024-04-26

## 2024-05-02 ENCOUNTER — OFFICE VISIT (OUTPATIENT)
Dept: OBGYN CLINIC | Facility: CLINIC | Age: 54
End: 2024-05-02
Payer: COMMERCIAL

## 2024-05-02 VITALS
RESPIRATION RATE: 18 BRPM | DIASTOLIC BLOOD PRESSURE: 74 MMHG | TEMPERATURE: 98 F | HEART RATE: 83 BPM | WEIGHT: 169 LBS | OXYGEN SATURATION: 98 % | BODY MASS INDEX: 25.03 KG/M2 | HEIGHT: 69 IN | SYSTOLIC BLOOD PRESSURE: 145 MMHG

## 2024-05-02 DIAGNOSIS — S32.009A CLOSED FRACTURE OF TRANSVERSE PROCESS OF LUMBAR VERTEBRA, INITIAL ENCOUNTER (HCC): Primary | ICD-10-CM

## 2024-05-02 DIAGNOSIS — S22.31XD CLOSED FRACTURE OF ONE RIB OF RIGHT SIDE WITH ROUTINE HEALING, SUBSEQUENT ENCOUNTER: ICD-10-CM

## 2024-05-02 PROCEDURE — 99213 OFFICE O/P EST LOW 20 MIN: CPT | Performed by: FAMILY MEDICINE

## 2024-05-02 NOTE — LETTER
May 2, 2024     Patient: Hector Mac  YOB: 1970  Date of Visit: 5/2/2024      To Whom it May Concern:    Hector Mac is under my professional care. Hector was seen in my office on 5/2/2024. Patient can return to work however would recommend avoidance of bending and squatting, would recommend avoidance of lifting if possible. Return in one month for re-evaluation     If you have any questions or concerns, please don't hesitate to call.         Sincerely,          Bijan Osuna DO        CC: No Recipients

## 2024-05-02 NOTE — PROGRESS NOTES
Assessment/Plan:    No problem-specific Assessment & Plan notes found for this encounter.       Diagnoses and all orders for this visit:    Closed fracture of transverse process of lumbar vertebra, initial encounter ([facility])    Closed fracture of one rib of right side with routine healing, subsequent encounter     54-year-old male patient presenting today for follow up evaluation consultation for closed fracture of the transverse process of the L2-L3 vertebrae along with a nondisplaced fracture of the 10th rib on the right side.  Patient reports subjective improvement in regards to pain symptoms however incomplete as of today's date.  His examination does reveal continued pain reproduction over the right rib at the area of the fracture site.  Recommending the patient begin with physical therapy for the next 3 to 4 weeks at which time he will return for reevaluation.  If pain symptoms are persisting we can consider further imaging to confirm healing of the rib and transverse process fracture.  He would like to return to work-letter was provided however would advise to avoid any lifting pushing or pulling until fracture is healed    Subjective:      Patient ID: Hector Mac is a 54 y.o. male presenting for a follow-up visit for fracture of the 10th rib right side with transverse process fracture of the L2-L3 vertebrae.  Patient was seen approximately 4 weeks ago.  He states the back pain has improved drastically however he still has pain with his right rib when laying on the affected side.  He does report overall improvement in regards to pain symptoms about 70% from the original injury date.  Denies any chest pain or shortness of breath        HPI    The following portions of the patient's history were reviewed and updated as appropriate: allergies, current medications, past family history, past medical history, past social history, past surgical history, and problem list.    Review of Systems      Objective:        "145/74   Pulse 83   Temp 98 °F (36.7 °C)   Resp 18   Ht 5' 9\" (1.753 m)   Wt 76.7 kg (169 lb)   SpO2 98%   BMI 24.96 kg/m²          Physical Exam  Constitutional:       General: He is not in acute distress.     Appearance: He is not ill-appearing or toxic-appearing.   Eyes:      Extraocular Movements: Extraocular movements intact.      Pupils: Pupils are equal, round, and reactive to light.   Musculoskeletal:      Lumbar back: Tenderness and bony tenderness present. No deformity. Normal range of motion. Negative right straight leg raise test and negative left straight leg raise test.      Comments: +right lateral chest wall pain, ttp rib 9-10  5/5 L1-L5   Neurological:      General: No focal deficit present.      Mental Status: He is alert and oriented to person, place, and time.           "

## 2024-05-13 DIAGNOSIS — M62.831 MUSCLE SPASM OF CALF: ICD-10-CM

## 2024-05-14 RX ORDER — METHOCARBAMOL 500 MG/1
500 TABLET, FILM COATED ORAL 4 TIMES DAILY
Qty: 120 TABLET | Refills: 0 | Status: SHIPPED | OUTPATIENT
Start: 2024-05-14

## 2024-06-06 ENCOUNTER — OFFICE VISIT (OUTPATIENT)
Dept: OBGYN CLINIC | Facility: CLINIC | Age: 54
End: 2024-06-06
Payer: COMMERCIAL

## 2024-06-06 ENCOUNTER — APPOINTMENT (OUTPATIENT)
Dept: RADIOLOGY | Facility: CLINIC | Age: 54
End: 2024-06-06
Payer: COMMERCIAL

## 2024-06-06 VITALS
WEIGHT: 169 LBS | OXYGEN SATURATION: 99 % | TEMPERATURE: 98.2 F | HEART RATE: 65 BPM | DIASTOLIC BLOOD PRESSURE: 75 MMHG | RESPIRATION RATE: 18 BRPM | SYSTOLIC BLOOD PRESSURE: 153 MMHG | HEIGHT: 69 IN | BODY MASS INDEX: 25.03 KG/M2

## 2024-06-06 DIAGNOSIS — S22.31XD CLOSED FRACTURE OF ONE RIB OF RIGHT SIDE WITH ROUTINE HEALING, SUBSEQUENT ENCOUNTER: ICD-10-CM

## 2024-06-06 DIAGNOSIS — S32.009A CLOSED FRACTURE OF TRANSVERSE PROCESS OF LUMBAR VERTEBRA, INITIAL ENCOUNTER (HCC): ICD-10-CM

## 2024-06-06 DIAGNOSIS — S22.31XD CLOSED FRACTURE OF ONE RIB OF RIGHT SIDE WITH ROUTINE HEALING, SUBSEQUENT ENCOUNTER: Primary | ICD-10-CM

## 2024-06-06 PROCEDURE — 99214 OFFICE O/P EST MOD 30 MIN: CPT | Performed by: FAMILY MEDICINE

## 2024-06-06 PROCEDURE — 72100 X-RAY EXAM L-S SPINE 2/3 VWS: CPT

## 2024-06-06 PROCEDURE — 71101 X-RAY EXAM UNILAT RIBS/CHEST: CPT

## 2024-06-06 NOTE — LETTER
June 6, 2024     Patient: Hector Mac  YOB: 1970  Date of Visit: 6/6/2024      To Whom it May Concern:    Hector Mac is under my professional care. Hector was seen in my office on 6/6/2024.  The patient can return to work duty as tolerated without restriction.    If you have any questions or concerns, please don't hesitate to call.         Sincerely,          Bijan Osuna DO        CC: No Recipients

## 2024-06-06 NOTE — PROGRESS NOTES
"Assessment/Plan:    No problem-specific Assessment & Plan notes found for this encounter.       Diagnoses and all orders for this visit:    Closed fracture of one rib of right side with routine healing, subsequent encounter         54-year-old male patient presenting for follow-up visit 8 weeks from original injury sustained nondisplaced fracture of the L2-L3 transverse process and right 10th rib.  Updated radiographs were obtained at today's visit and reviewed independently.  Prior mentioned transverse process fracture of the L2-L3 vertebral body was not appreciated on today's radiograph.  Right 10th rib fracture reveals interval healing however incomplete as of today's date.  Notable callus formation at fracture site.  Given interval healing and pain-free range of motion on examination I advised the patient can return to full work duty as tolerated.  He will follow-up as needed if symptoms fail to improve.    Subjective:      Patient ID: Hector Mac is a 54 y.o. male presenting for follow-up visit in regards to nondisplaced fracture of the 10th rib and transverse process of the L2-L3 vertebrae.  Initial injury had occurred approximately 8 weeks ago and patient has gradually improved since the original injury date.  He was provided a return to work with modified restriction at the last visit.  States that he has been able to tolerate work activity without any pain.  He still has some pain reproduction when he lays on the right side of his body.        HPI    The following portions of the patient's history were reviewed and updated as appropriate: allergies, current medications, past family history, past medical history, past social history, past surgical history, and problem list.    Review of Systems      Objective:      /75   Pulse 65   Temp 98.2 °F (36.8 °C)   Resp 18   Ht 5' 9\" (1.753 m)   Wt 76.7 kg (169 lb)   SpO2 99%   BMI 24.96 kg/m²          Physical Exam  Constitutional:       General: He " is not in acute distress.     Appearance: He is not ill-appearing or toxic-appearing.   Eyes:      Extraocular Movements: Extraocular movements intact.      Pupils: Pupils are equal, round, and reactive to light.   Musculoskeletal:      Lumbar back: No deformity, tenderness or bony tenderness. Normal range of motion. Negative right straight leg raise test and negative left straight leg raise test.      Comments: +right lateral chest wall pain, ttp rib 9-10  5/5 L1-L5   Neurological:      General: No focal deficit present.      Mental Status: He is alert and oriented to person, place, and time.

## 2024-06-17 ENCOUNTER — VBI (OUTPATIENT)
Dept: ADMINISTRATIVE | Facility: OTHER | Age: 54
End: 2024-06-17

## 2024-06-17 NOTE — TELEPHONE ENCOUNTER
06/17/24 2:43 PM     Chart reviewed for CRC: Colonoscopy ; nothing is submitted to the patient's insurance at this time.     Vanessa Jennings   [facility]

## 2024-06-19 NOTE — RESULT ENCOUNTER NOTE
Very mild spondylitic changes with osteophytic lipping greater off the superior endplates of L3 and L4  Sees ORTHo

## 2024-07-23 ENCOUNTER — HOSPITAL ENCOUNTER (OUTPATIENT)
Dept: VASCULAR ULTRASOUND | Facility: HOSPITAL | Age: 54
Discharge: HOME | End: 2024-07-23
Attending: SURGERY
Payer: COMMERCIAL

## 2024-07-23 DIAGNOSIS — I74.4 EMBOLISM AND THROMBOSIS OF ARTERIES OF EXTREMITIES (HCC): ICD-10-CM

## 2024-07-23 PROCEDURE — 93926 LOWER EXTREMITY STUDY: CPT

## 2024-07-24 PROCEDURE — 93926 LOWER EXTREMITY STUDY: CPT | Performed by: SURGERY

## 2024-07-24 PROCEDURE — 93922 UPR/L XTREMITY ART 2 LEVELS: CPT | Performed by: SURGERY

## 2024-07-25 ENCOUNTER — TRANSCRIBE ORDERS (OUTPATIENT)
Dept: VASCULAR SURGERY | Facility: CLINIC | Age: 54
End: 2024-07-25

## 2024-07-25 DIAGNOSIS — I73.9 PAD (PERIPHERAL ARTERY DISEASE) (HCC): Primary | ICD-10-CM

## 2024-07-31 ENCOUNTER — TELEPHONE (OUTPATIENT)
Dept: FAMILY MEDICINE CLINIC | Facility: CLINIC | Age: 54
End: 2024-07-31

## 2024-07-31 NOTE — TELEPHONE ENCOUNTER
Patient's wife informed and will notify patient.      ----- Message from NATALYA Briceño sent at 7/31/2024  8:04 AM EDT -----  Stable results, stent is patent. No significant change from previous test

## 2024-08-05 DIAGNOSIS — M62.831 MUSCLE SPASM OF CALF: ICD-10-CM

## 2024-08-07 RX ORDER — METHOCARBAMOL 500 MG/1
500 TABLET, FILM COATED ORAL 4 TIMES DAILY PRN
Qty: 30 TABLET | Refills: 1 | Status: SHIPPED | OUTPATIENT
Start: 2024-08-07

## 2024-08-16 DIAGNOSIS — M79.604 PAIN IN RIGHT LEG: ICD-10-CM

## 2024-08-16 RX ORDER — ATORVASTATIN CALCIUM 40 MG/1
40 TABLET, FILM COATED ORAL
Qty: 90 TABLET | Refills: 0 | Status: SHIPPED | OUTPATIENT
Start: 2024-08-16

## 2024-08-19 DIAGNOSIS — I10 ESSENTIAL HYPERTENSION: ICD-10-CM

## 2024-08-20 RX ORDER — AMLODIPINE BESYLATE 5 MG/1
5 TABLET ORAL DAILY
Qty: 90 TABLET | Refills: 1 | Status: SHIPPED | OUTPATIENT
Start: 2024-08-20

## 2024-09-02 DIAGNOSIS — M79.604 PAIN IN RIGHT LEG: ICD-10-CM

## 2024-09-03 RX ORDER — GABAPENTIN 300 MG/1
300 CAPSULE ORAL 3 TIMES DAILY
Qty: 90 CAPSULE | Refills: 2 | Status: SHIPPED | OUTPATIENT
Start: 2024-09-03

## 2024-09-04 DIAGNOSIS — M62.831 MUSCLE SPASM OF CALF: ICD-10-CM

## 2024-09-05 RX ORDER — METHOCARBAMOL 500 MG/1
TABLET, FILM COATED ORAL
Qty: 30 TABLET | Refills: 1 | Status: SHIPPED | OUTPATIENT
Start: 2024-09-05

## 2024-09-17 ENCOUNTER — OFFICE VISIT (OUTPATIENT)
Dept: FAMILY MEDICINE CLINIC | Facility: CLINIC | Age: 54
End: 2024-09-17
Payer: COMMERCIAL

## 2024-09-17 ENCOUNTER — APPOINTMENT (OUTPATIENT)
Dept: LAB | Facility: CLINIC | Age: 54
End: 2024-09-17
Payer: COMMERCIAL

## 2024-09-17 VITALS
TEMPERATURE: 97.5 F | BODY MASS INDEX: 25.53 KG/M2 | DIASTOLIC BLOOD PRESSURE: 70 MMHG | SYSTOLIC BLOOD PRESSURE: 150 MMHG | WEIGHT: 172.38 LBS | HEART RATE: 69 BPM | HEIGHT: 69 IN | OXYGEN SATURATION: 98 %

## 2024-09-17 DIAGNOSIS — R73.03 PREDIABETES: ICD-10-CM

## 2024-09-17 DIAGNOSIS — E78.2 HYPERLIPIDEMIA, MIXED: ICD-10-CM

## 2024-09-17 DIAGNOSIS — Z12.5 PROSTATE CANCER SCREENING: ICD-10-CM

## 2024-09-17 DIAGNOSIS — I10 ESSENTIAL HYPERTENSION: ICD-10-CM

## 2024-09-17 DIAGNOSIS — Z13.29 THYROID DISORDER SCREEN: ICD-10-CM

## 2024-09-17 DIAGNOSIS — Z00.00 ANNUAL PHYSICAL EXAM: Primary | ICD-10-CM

## 2024-09-17 DIAGNOSIS — Z12.11 COLON CANCER SCREENING: ICD-10-CM

## 2024-09-17 DIAGNOSIS — M62.831 MUSCLE SPASM OF CALF: ICD-10-CM

## 2024-09-17 DIAGNOSIS — M79.604 PAIN IN RIGHT LEG: ICD-10-CM

## 2024-09-17 LAB
ALBUMIN SERPL BCG-MCNC: 4.1 G/DL (ref 3.5–5)
ALP SERPL-CCNC: 81 U/L (ref 34–104)
ALT SERPL W P-5'-P-CCNC: 27 U/L (ref 7–52)
ANION GAP SERPL CALCULATED.3IONS-SCNC: 11 MMOL/L (ref 4–13)
AST SERPL W P-5'-P-CCNC: 28 U/L (ref 13–39)
BILIRUB SERPL-MCNC: 0.37 MG/DL (ref 0.2–1)
BUN SERPL-MCNC: 12 MG/DL (ref 5–25)
CALCIUM SERPL-MCNC: 8.7 MG/DL (ref 8.4–10.2)
CHLORIDE SERPL-SCNC: 102 MMOL/L (ref 96–108)
CHOLEST SERPL-MCNC: 147 MG/DL
CO2 SERPL-SCNC: 24 MMOL/L (ref 21–32)
CREAT SERPL-MCNC: 0.76 MG/DL (ref 0.6–1.3)
ERYTHROCYTE [DISTWIDTH] IN BLOOD BY AUTOMATED COUNT: 20.7 % (ref 11.6–15.1)
GFR SERPL CREATININE-BSD FRML MDRD: 102 ML/MIN/1.73SQ M
GLUCOSE P FAST SERPL-MCNC: 143 MG/DL (ref 65–99)
HCT VFR BLD AUTO: 38.9 % (ref 36.5–49.3)
HDLC SERPL-MCNC: 48 MG/DL
HGB BLD-MCNC: 11.9 G/DL (ref 12–17)
LDLC SERPL CALC-MCNC: 65 MG/DL (ref 0–100)
MCH RBC QN AUTO: 22.6 PG (ref 26.8–34.3)
MCHC RBC AUTO-ENTMCNC: 30.6 G/DL (ref 31.4–37.4)
MCV RBC AUTO: 74 FL (ref 82–98)
NONHDLC SERPL-MCNC: 99 MG/DL
PLATELET # BLD AUTO: 503 THOUSANDS/UL (ref 149–390)
PMV BLD AUTO: 8.9 FL (ref 8.9–12.7)
POTASSIUM SERPL-SCNC: 4.1 MMOL/L (ref 3.5–5.3)
PROT SERPL-MCNC: 7.7 G/DL (ref 6.4–8.4)
PSA FREE MFR SERPL: 47.49 %
PSA FREE SERPL-MCNC: 0.12 NG/ML
PSA SERPL-MCNC: 0.26 NG/ML (ref 0–4)
RBC # BLD AUTO: 5.26 MILLION/UL (ref 3.88–5.62)
SODIUM SERPL-SCNC: 137 MMOL/L (ref 135–147)
TRIGL SERPL-MCNC: 168 MG/DL
TSH SERPL DL<=0.05 MIU/L-ACNC: 3.33 UIU/ML (ref 0.45–4.5)
WBC # BLD AUTO: 9.93 THOUSAND/UL (ref 4.31–10.16)

## 2024-09-17 PROCEDURE — 84153 ASSAY OF PSA TOTAL: CPT

## 2024-09-17 PROCEDURE — 99396 PREV VISIT EST AGE 40-64: CPT | Performed by: STUDENT IN AN ORGANIZED HEALTH CARE EDUCATION/TRAINING PROGRAM

## 2024-09-17 PROCEDURE — 84443 ASSAY THYROID STIM HORMONE: CPT

## 2024-09-17 PROCEDURE — 85027 COMPLETE CBC AUTOMATED: CPT

## 2024-09-17 PROCEDURE — 36415 COLL VENOUS BLD VENIPUNCTURE: CPT

## 2024-09-17 PROCEDURE — 83036 HEMOGLOBIN GLYCOSYLATED A1C: CPT

## 2024-09-17 PROCEDURE — 80053 COMPREHEN METABOLIC PANEL: CPT

## 2024-09-17 PROCEDURE — 84154 ASSAY OF PSA FREE: CPT

## 2024-09-17 PROCEDURE — 80061 LIPID PANEL: CPT

## 2024-09-17 RX ORDER — GABAPENTIN 300 MG/1
300 CAPSULE ORAL 3 TIMES DAILY
Qty: 90 CAPSULE | Refills: 2 | Status: SHIPPED | OUTPATIENT
Start: 2024-09-17

## 2024-09-17 RX ORDER — METHOCARBAMOL 500 MG/1
500 TABLET, FILM COATED ORAL 3 TIMES DAILY
Qty: 90 TABLET | Refills: 1 | Status: SHIPPED | OUTPATIENT
Start: 2024-09-17

## 2024-09-17 RX ORDER — METHOCARBAMOL 500 MG/1
500 TABLET, FILM COATED ORAL
Qty: 30 TABLET | Refills: 1 | Status: SHIPPED | OUTPATIENT
Start: 2024-09-17 | End: 2024-09-17

## 2024-09-17 NOTE — ASSESSMENT & PLAN NOTE
Most recent A1c 6.3, patient does have some recent weight gain and does endorse poor diet.  Will recheck A1c and follow-up pending results.  Orders:    Comprehensive metabolic panel; Future    Hemoglobin A1C; Future    CBC and Platelet; Future

## 2024-09-17 NOTE — ASSESSMENT & PLAN NOTE
Orders:    gabapentin (NEURONTIN) 300 mg capsule; Take 1 capsule (300 mg total) by mouth 3 (three) times a day

## 2024-09-17 NOTE — ASSESSMENT & PLAN NOTE
Slightly elevated today 150/70, patient reports that he has not taken blood pressure medication this morning yet.  I have discussed low-sodium diet with the patient and increasing physical activity.  Will monitor and recheck at next visit.

## 2024-09-17 NOTE — PROGRESS NOTES
Adult Annual Physical  Name: Hector Mac      : 1970      MRN: [id number]  Encounter Provider: Keely Ramirez DO  Encounter Date: 2024   Encounter department: [facility]    Assessment & Plan  Annual physical exam  Annual physical exam completed, patient is due for colon cancer screening and a occult blood fit test has been ordered.  Routine blood work has also been ordered including PSA and I will follow-up pending results patient is due for dental and eye exam.       Colon cancer screening    Orders:    Occult Blood, Fecal Immunochemical; Future    Prediabetes  Most recent A1c 6.3, patient does have some recent weight gain and does endorse poor diet.  Will recheck A1c and follow-up pending results.  Orders:    Comprehensive metabolic panel; Future    Hemoglobin A1C; Future    CBC and Platelet; Future    Thyroid disorder screen    Orders:    TSH, 3rd generation with Free T4 reflex; Future    CBC and Platelet; Future    Hyperlipidemia, mixed  Continue statin, repeat lipid panel pending.  Orders:    Lipid panel; Future    CBC and Platelet; Future    Prostate cancer screening    Orders:    PSA, total and free; Future    Muscle spasm of calf    Orders:    methocarbamol (ROBAXIN) 500 mg tablet; Take 1 tablet (500 mg total) by mouth 3 (three) times a day    Pain in right leg    Orders:    gabapentin (NEURONTIN) 300 mg capsule; Take 1 capsule (300 mg total) by mouth 3 (three) times a day    Immunizations and preventive care screenings were discussed with patient today. Appropriate education was printed on patient's after visit summary.    Discussed risks and benefits of prostate cancer screening. We discussed the controversial history of PSA screening for prostate cancer in the [place] as well as the risk of over detection and over treatment of prostate cancer by way of PSA screening.  The patient understands that PSA blood testing is an imperfect way to screen for  "prostate cancer and that elevated PSA levels in the blood may also be caused by infection, inflammation, prostatic trauma or manipulation, urological procedures, or by benign prostatic enlargement.    The role of the digital rectal examination in prostate cancer screening was also discussed and I discussed with him that there is large interobserver variability in the findings of digital rectal examination.    Counseling:  Alcohol/drug use: discussed moderation in alcohol intake, the recommendations for healthy alcohol use, and avoidance of illicit drug use.  Dental Health: discussed importance of regular tooth brushing, flossing, and dental visits.  Exercise: the importance of regular exercise/physical activity was discussed. Recommend exercise 3-5 times per week for at least 30 minutes.          History of Present Illness     Adult Annual Physical:  Patient presents for annual physical.     Diet and Physical Activity:  - Diet/Nutrition: poor diet.  - Exercise: no formal exercise.    General Health:  - Sleep: 4-6 hours of sleep on average.  - Hearing: normal hearing right ear.  - Vision: most recent eye exam > 1 year ago.  - Dental: no dental visits for > 1 year.    /GYN Health:    - History of STDs: no     Health:  - History of STDs: no.     Review of Systems   Constitutional:  Negative for chills and fever.   HENT:  Negative for congestion and rhinorrhea.    Respiratory:  Negative for cough and shortness of breath.    Cardiovascular:  Positive for leg swelling. Negative for chest pain and palpitations.   Gastrointestinal:  Negative for abdominal pain, constipation, diarrhea, nausea and vomiting.   Neurological:  Negative for dizziness and headaches.           Objective     /70 (BP Location: Left arm, Patient Position: Sitting, Cuff Size: Large)   Pulse 69   Temp 97.5 °F (36.4 °C) (Temporal)   Ht 5' 9\" (1.753 m)   Wt 78.2 kg (172 lb 6 oz)   SpO2 98%   BMI 25.46 kg/m²     Physical Exam  Vitals " reviewed.   Constitutional:       Appearance: Normal appearance.   HENT:      Head: Normocephalic and atraumatic.      Mouth/Throat:      Mouth: Mucous membranes are moist.      Pharynx: No oropharyngeal exudate or posterior oropharyngeal erythema.   Cardiovascular:      Rate and Rhythm: Normal rate and regular rhythm.      Heart sounds: Normal heart sounds.   Pulmonary:      Effort: Pulmonary effort is normal.      Breath sounds: Normal breath sounds.   Musculoskeletal:      Right lower leg: Edema present.      Comments: Trace pretibial edema in right lower extremity.   Skin:     Comments: Healed incision on right lateral leg with dry skin along scar.   Neurological:      Mental Status: He is alert and oriented to person, place, and time.      Sensory: Sensory deficit present.   Psychiatric:         Mood and Affect: Mood normal.         Behavior: Behavior normal.

## 2024-09-17 NOTE — ASSESSMENT & PLAN NOTE
Annual physical exam completed, patient is due for colon cancer screening and a occult blood fit test has been ordered.  Routine blood work has also been ordered including PSA and I will follow-up pending results patient is due for dental and eye exam.

## 2024-09-17 NOTE — ASSESSMENT & PLAN NOTE
Continue statin, repeat lipid panel pending.  Orders:    Lipid panel; Future    CBC and Platelet; Future

## 2024-09-17 NOTE — PATIENT INSTRUCTIONS
"Patient Education     Routine physical for adults   The Basics   Written by the doctors and editors at [facility]   What is a physical? -- A physical is a routine visit, or \"check-up,\" with your doctor. You might also hear it called a \"wellness visit\" or \"preventive visit.\"  During each visit, the doctor will:   Ask about your physical and mental health   Ask about your habits, behaviors, and lifestyle   Do an exam   Give you vaccines if needed   Talk to you about any medicines you take   Give advice about your health   Answer your questions  Getting regular check-ups is an important part of taking care of your health. It can help your doctor find and treat any problems you have. But it's also important for preventing health problems.  A routine physical is different from a \"sick visit.\" A sick visit is when you see a doctor because of a health concern or problem. Since physicals are scheduled ahead of time, you can think about what you want to ask the doctor.  How often should I get a physical? -- It depends on your age and health. In general, for people age 21 years and older:   If you are younger than 50 years, you might be able to get a physical every 3 years.   If you are 50 years or older, your doctor might recommend a physical every year.  If you have an ongoing health condition, like diabetes or high blood pressure, your doctor will probably want to see you more often.  What happens during a physical? -- In general, each visit will include:   Physical exam - The doctor or nurse will check your height, weight, heart rate, and blood pressure. They will also look at your eyes and ears. They will ask about how you are feeling and whether you have any symptoms that bother you.   Medicines - It's a good idea to bring a list of all the medicines you take to each doctor visit. Your doctor will talk to you about your medicines and answer any questions. Tell them if you are having any side effects that bother you. You " "should also tell them if you are having trouble paying for any of your medicines.   Habits and behaviors - This includes:   Your diet   Your exercise habits   Whether you smoke, drink alcohol, or use drugs   Whether you are sexually active   Whether you feel safe at home  Your doctor will talk to you about things you can do to improve your health and lower your risk of health problems. They will also offer help and support. For example, if you want to quit smoking, they can give you advice and might prescribe medicines. If you want to improve your diet or get more physical activity, they can help you with this, too.   Lab tests, if needed - The tests you get will depend on your age and situation. For example, your doctor might want to check your:   Cholesterol   Blood sugar   Iron level   Vaccines - The recommended vaccines will depend on your age, health, and what vaccines you already had. Vaccines are very important because they can prevent certain serious or deadly infections.   Discussion of screening - \"Screening\" means checking for diseases or other health problems before they cause symptoms. Your doctor can recommend screening based on your age, risk, and preferences. This might include tests to check for:   Cancer, such as breast, prostate, cervical, ovarian, colorectal, prostate, lung, or skin cancer   Sexually transmitted infections, such as chlamydia and gonorrhea   Mental health conditions like depression and anxiety  Your doctor will talk to you about the different types of screening tests. They can help you decide which screenings to have. They can also explain what the results might mean.   Answering questions - The physical is a good time to ask the doctor or nurse questions about your health. If needed, they can refer you to other doctors or specialists, too.  Adults older than 65 years often need other care, too. As you get older, your doctor will talk to you about:   How to prevent falling at " home   Hearing or vision tests   Memory testing   How to take your medicines safely   Making sure that you have the help and support you need at home  All topics are updated as new evidence becomes available and our peer review process is complete.  This topic retrieved from [employer] on: May 02, 2024.  Topic 104366 Version 1.0  Release: 32.4.3 - C32.122  © 2024 UpToDate, Inc. and/or its affiliates. All rights reserved.  Consumer Information Use and Disclaimer   Disclaimer: This generalized information is a limited summary of diagnosis, treatment, and/or medication information. It is not meant to be comprehensive and should be used as a tool to help the user understand and/or assess potential diagnostic and treatment options. It does NOT include all information about conditions, treatments, medications, side effects, or risks that may apply to a specific patient. It is not intended to be medical advice or a substitute for the medical advice, diagnosis, or treatment of a health care provider based on the health care provider's examination and assessment of a patient's specific and unique circumstances. Patients must speak with a health care provider for complete information about their health, medical questions, and treatment options, including any risks or benefits regarding use of medications. This information does not endorse any treatments or medications as safe, effective, or approved for treating a specific patient. UpToDate, Inc. and its affiliates disclaim any warranty or liability relating to this information or the use thereof.The use of this information is governed by the Terms of Use, available at https://www.woltersCallisionuwer.com/en/know/clinical-effectiveness-terms. 2024© UpToDate, Inc. and its affiliates and/or licensors. All rights reserved.  Copyright   © 2024 Up[employer]Date, Inc. and/or its affiliates. All rights reserved.

## 2024-09-18 ENCOUNTER — TELEPHONE (OUTPATIENT)
Dept: FAMILY MEDICINE CLINIC | Facility: CLINIC | Age: 54
End: 2024-09-18

## 2024-09-18 LAB
EST. AVERAGE GLUCOSE BLD GHB EST-MCNC: 140 MG/DL
HBA1C MFR BLD: 6.5 %

## 2024-09-18 NOTE — TELEPHONE ENCOUNTER
Left message on machine asking patient to call back for results.    ----- Message from Keely Ramirez DO sent at 9/18/2024  8:58 AM EDT -----  Please let patient know that I have reviewed his labs. His A1c has increased and is now in the diabetic range. My plan is to repeat it a week before his next visit with me- order has been placed. During this time patient should make strict dietary changes. He should do a low carb diet being mindful of sugar intake as well. His cholesterol has improved which is great. Lastly his hgb is still low, he should start OTC ferrous sulfate 325mg once daily and he should complete his colon cancer screening as we discussed asap.

## 2024-10-15 ENCOUNTER — TELEPHONE (OUTPATIENT)
Dept: HEMATOLOGY ONCOLOGY | Facility: CLINIC | Age: 54
End: 2024-10-15

## 2024-10-15 NOTE — TELEPHONE ENCOUNTER
Called patient in regards of provider change . Left message with all info and with [facility] line tel number

## 2024-10-16 ENCOUNTER — TELEPHONE (OUTPATIENT)
Dept: HEMATOLOGY ONCOLOGY | Facility: CLINIC | Age: 54
End: 2024-10-16

## 2024-10-21 DIAGNOSIS — M79.604 PAIN IN RIGHT LEG: ICD-10-CM

## 2024-10-22 RX ORDER — GABAPENTIN 300 MG/1
300 CAPSULE ORAL 3 TIMES DAILY
Qty: 90 CAPSULE | Refills: 0 | Status: SHIPPED | OUTPATIENT
Start: 2024-10-22

## 2024-11-05 ENCOUNTER — HOSPITAL ENCOUNTER (OUTPATIENT)
Dept: CT IMAGING | Facility: HOSPITAL | Age: 54
Discharge: HOME | End: 2024-11-05
Attending: INTERNAL MEDICINE
Payer: COMMERCIAL

## 2024-11-05 ENCOUNTER — TELEPHONE (OUTPATIENT)
Age: 54
End: 2024-11-05

## 2024-11-05 DIAGNOSIS — I74.4 EMBOLISM AND THROMBOSIS OF ARTERIES OF EXTREMITIES (HCC): ICD-10-CM

## 2024-11-05 DIAGNOSIS — R73.03 PREDIABETES: Primary | ICD-10-CM

## 2024-11-05 DIAGNOSIS — K86.9 PANCREATIC LESION: ICD-10-CM

## 2024-11-05 PROCEDURE — 71260 CT THORAX DX C+: CPT

## 2024-11-05 PROCEDURE — 74177 CT ABD & PELVIS W/CONTRAST: CPT

## 2024-11-05 RX ADMIN — IOHEXOL 100 ML: 350 INJECTION, SOLUTION INTRAVENOUS at 16:08

## 2024-11-05 NOTE — TELEPHONE ENCOUNTER
Phone call from patient's wife Maria Luisa stating Vikas is going for labwork today at 4pm and he wants to get the lab work Dr Ramirez wanted him to have done as well. I do not see the order in the chart for a repeat Hgb A1c.  Please advise and let his wife know once order is in his chart please.  Please advise. Thank you.

## 2024-11-12 DIAGNOSIS — M62.831 MUSCLE SPASM OF CALF: ICD-10-CM

## 2024-11-12 RX ORDER — METHOCARBAMOL 500 MG/1
500 TABLET, FILM COATED ORAL 3 TIMES DAILY
Qty: 90 TABLET | Refills: 1 | Status: SHIPPED | OUTPATIENT
Start: 2024-11-12

## 2024-11-15 DIAGNOSIS — K86.9 PANCREATIC LESION: ICD-10-CM

## 2024-11-15 DIAGNOSIS — I74.4 EMBOLISM AND THROMBOSIS OF ARTERIES OF EXTREMITIES (HCC): Primary | ICD-10-CM

## 2024-11-16 DIAGNOSIS — M79.604 PAIN IN RIGHT LEG: ICD-10-CM

## 2024-11-18 ENCOUNTER — TELEPHONE (OUTPATIENT)
Dept: HEMATOLOGY ONCOLOGY | Facility: CLINIC | Age: 54
End: 2024-11-18

## 2024-11-18 RX ORDER — ATORVASTATIN CALCIUM 40 MG/1
40 TABLET, FILM COATED ORAL
Qty: 90 TABLET | Refills: 1 | Status: SHIPPED | OUTPATIENT
Start: 2024-11-18

## 2024-11-19 ENCOUNTER — APPOINTMENT (OUTPATIENT)
Age: 54
End: 2024-11-19
Payer: COMMERCIAL

## 2024-11-19 DIAGNOSIS — K86.9 PANCREATIC LESION: ICD-10-CM

## 2024-11-19 DIAGNOSIS — I74.4 EMBOLISM AND THROMBOSIS OF ARTERIES OF EXTREMITIES (HCC): ICD-10-CM

## 2024-11-19 LAB
ALBUMIN SERPL BCG-MCNC: 4.3 G/DL (ref 3.5–5)
ALP SERPL-CCNC: 84 U/L (ref 34–104)
ALT SERPL W P-5'-P-CCNC: 24 U/L (ref 7–52)
ANION GAP SERPL CALCULATED.3IONS-SCNC: 8 MMOL/L (ref 4–13)
AST SERPL W P-5'-P-CCNC: 26 U/L (ref 13–39)
BASOPHILS # BLD AUTO: 0.1 THOUSANDS/ÂΜL (ref 0–0.1)
BASOPHILS NFR BLD AUTO: 1 % (ref 0–1)
BILIRUB SERPL-MCNC: 0.31 MG/DL (ref 0.2–1)
BUN SERPL-MCNC: 12 MG/DL (ref 5–25)
CALCIUM SERPL-MCNC: 9.3 MG/DL (ref 8.4–10.2)
CHLORIDE SERPL-SCNC: 101 MMOL/L (ref 96–108)
CO2 SERPL-SCNC: 27 MMOL/L (ref 21–32)
CREAT SERPL-MCNC: 0.84 MG/DL (ref 0.6–1.3)
EOSINOPHIL # BLD AUTO: 0.44 THOUSAND/ÂΜL (ref 0–0.61)
EOSINOPHIL NFR BLD AUTO: 4 % (ref 0–6)
ERYTHROCYTE [DISTWIDTH] IN BLOOD BY AUTOMATED COUNT: 18.5 % (ref 11.6–15.1)
GFR SERPL CREATININE-BSD FRML MDRD: 98 ML/MIN/1.73SQ M
GLUCOSE P FAST SERPL-MCNC: 101 MG/DL (ref 65–99)
HCT VFR BLD AUTO: 38.1 % (ref 36.5–49.3)
HGB BLD-MCNC: 11.9 G/DL (ref 12–17)
IMM GRANULOCYTES # BLD AUTO: 0.03 THOUSAND/UL (ref 0–0.2)
IMM GRANULOCYTES NFR BLD AUTO: 0 % (ref 0–2)
LYMPHOCYTES # BLD AUTO: 4.17 THOUSANDS/ÂΜL (ref 0.6–4.47)
LYMPHOCYTES NFR BLD AUTO: 37 % (ref 14–44)
MCH RBC QN AUTO: 22.7 PG (ref 26.8–34.3)
MCHC RBC AUTO-ENTMCNC: 31.2 G/DL (ref 31.4–37.4)
MCV RBC AUTO: 73 FL (ref 82–98)
MONOCYTES # BLD AUTO: 0.94 THOUSAND/ÂΜL (ref 0.17–1.22)
MONOCYTES NFR BLD AUTO: 8 % (ref 4–12)
NEUTROPHILS # BLD AUTO: 5.61 THOUSANDS/ÂΜL (ref 1.85–7.62)
NEUTS SEG NFR BLD AUTO: 50 % (ref 43–75)
NRBC BLD AUTO-RTO: 0 /100 WBCS
PLATELET # BLD AUTO: 580 THOUSANDS/UL (ref 149–390)
PMV BLD AUTO: 9.2 FL (ref 8.9–12.7)
POTASSIUM SERPL-SCNC: 4 MMOL/L (ref 3.5–5.3)
PROT SERPL-MCNC: 8.1 G/DL (ref 6.4–8.4)
RBC # BLD AUTO: 5.25 MILLION/UL (ref 3.88–5.62)
SODIUM SERPL-SCNC: 136 MMOL/L (ref 135–147)
WBC # BLD AUTO: 11.29 THOUSAND/UL (ref 4.31–10.16)

## 2024-11-19 PROCEDURE — 85025 COMPLETE CBC W/AUTO DIFF WBC: CPT

## 2024-11-19 PROCEDURE — 80053 COMPREHEN METABOLIC PANEL: CPT

## 2024-11-19 PROCEDURE — 36415 COLL VENOUS BLD VENIPUNCTURE: CPT

## 2024-11-20 ENCOUNTER — OFFICE VISIT (OUTPATIENT)
Dept: HEMATOLOGY ONCOLOGY | Facility: CLINIC | Age: 54
End: 2024-11-20
Payer: COMMERCIAL

## 2024-11-20 VITALS
DIASTOLIC BLOOD PRESSURE: 72 MMHG | HEIGHT: 69 IN | SYSTOLIC BLOOD PRESSURE: 122 MMHG | RESPIRATION RATE: 18 BRPM | WEIGHT: 180 LBS | OXYGEN SATURATION: 98 % | BODY MASS INDEX: 26.66 KG/M2 | TEMPERATURE: 97.8 F | HEART RATE: 80 BPM

## 2024-11-20 DIAGNOSIS — K86.9 PANCREATIC LESION: Primary | ICD-10-CM

## 2024-11-20 PROCEDURE — 99211 OFF/OP EST MAY X REQ PHY/QHP: CPT | Performed by: INTERNAL MEDICINE

## 2024-11-20 NOTE — PROGRESS NOTES
Hector Mac  1970  [facility] [place]  [address]  [place] [place] [address]    Came for F/U of the result of CT scan , no major complain     Oncology History    No history exists.       History of Present Illness:  54-year-old here for follow-up with a history of chronic PE or thrombus.  He is on anticoagulant.  He had a pancreatic tail mass and a right adrenal nodule.  They are not well-seen on scans at this time.  They were done in beginning of April.  He had multiple testing which did not show any evidence of any abnormalities.  There was a question of liver abnormality again not seen on the scan.      Repeat CT scan showed no abnormalities        Review of Systems   Constitutional:  Negative for chills and fever.   HENT:  Negative for ear pain and sore throat.    Eyes:  Negative for pain and visual disturbance.   Respiratory:  Negative for cough and shortness of breath.    Cardiovascular:  Negative for chest pain and palpitations.   Gastrointestinal:  Negative for abdominal pain and vomiting.   Genitourinary:  Negative for dysuria and hematuria.   Musculoskeletal:  Negative for arthralgias and back pain.   Skin:  Negative for color change and rash.   Neurological:  Negative for seizures and syncope.   All other systems reviewed and are negative.       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) 07/28/2023    Hypertension     Lung nodule      Prediabetes     Tobacco dependence 10/03/2018     Past Surgical History:   Procedure Laterality Date    IR LOWER EXTREMITY ANGIOGRAM  2023    THROMBECTOMY W/ EMBOLECTOMY Right 2023    Procedure: RIGHT LEG BALLOON ANGIOPLASTY, STENT, FASCIOTOMY;  Surgeon: Eli Berry MD;  [facility];  Service: Vascular    WOUND DEBRIDEMENT Right 8/3/2023    Procedure: DEBRIDEMENT LOWER EXTREMITY (WASH OUT) R LEG FASCIOTOMY WOUND WASHOUT, DEBRIDEMENT, AND POSSIBLE CLOSURE VS VAC PLACEMENT;  Surgeon: Eliot Berkowitz DO;  Location[facility];  Service: Vascular     Family History   Problem Relation Age of Onset    Dementia Mother     Cancer Father     Lupus Brother      Social History     Socioeconomic History    Marital status: /Civil Union     Spouse name: Not on file    Number of children: Not on file    Years of education: Not on file    Highest education level: Not on file   Occupational History    Not on file   Tobacco Use    Smoking status: Former     Current packs/day: 0.00     Average packs/day: 0.5 packs/day for 35.6 years (17.8 ttl pk-yrs)     Types: Cigarettes     Start date:      Quit date: 2023     Years since quittin.3     Passive exposure: Never    Smokeless tobacco: Never   Vaping Use    Vaping status: Never Used   Substance and Sexual Activity    Alcohol use: Yes     Alcohol/week: 14.0 standard drinks of alcohol     Types: 14 Cans of beer per week    Drug use: No    Sexual activity: Not Currently   Other Topics Concern    Not on file   Social History Narrative    Not on file     Social Drivers of Health     Financial Resource Strain: Not on file   Food Insecurity: No Food Insecurity (2023)    Hunger Vital Sign     Worried About Running Out of Food in the Last Year: Never true     Ran Out of Food in the Last Year: Never true   Transportation Needs: No Transportation Needs (2023)    PRAPARE - Transportation     Lack of Transportation (Medical): No     Lack of  "Transportation (Non-Medical): No   Physical Activity: Not on file   Stress: Not on file   Social Connections: Unknown (6/18/2024)    Received from [employer]    Social [employer]     How often do you feel lonely or isolated from those around you? (Adult - for ages 18 years and over): Not on file   Intimate Partner Violence: Not on file   Housing Stability: Low Risk  (7/31/2023)    Housing Stability Vital Sign     Unable to Pay for Housing in the Last Year: No     Number of Times Moved in the Last Year: 1     Homeless in the Last Year: No       Current Outpatient Medications:     acetaminophen (TYLENOL) 325 mg tablet, Take 2 tablets (650 mg total) by mouth every 6 (six) hours as needed for mild pain, Disp: , Rfl:     amLODIPine (NORVASC) 5 mg tablet, take 1 tablet by mouth once daily, Disp: 90 tablet, Rfl: 1    aspirin (ECOTRIN LOW STRENGTH) 81 mg EC tablet, Take 81 mg by mouth daily, Disp: , Rfl:     atorvastatin (LIPITOR) 40 mg tablet, take 1 tablet by mouth at bedtime, Disp: 90 tablet, Rfl: 1    DULoxetine (CYMBALTA) 60 mg delayed release capsule, take 1 capsule by mouth once daily, Disp: 30 capsule, Rfl: 5    gabapentin (NEURONTIN) 300 mg capsule, Take 1 capsule (300 mg total) by mouth 3 (three) times a day, Disp: 90 capsule, Rfl: 0    methocarbamol (ROBAXIN) 500 mg tablet, take 1 tablet by mouth three times a day, Disp: 90 tablet, Rfl: 1    warfarin (COUMADIN) 5 mg tablet, take 2 tablets (10MG) ON FRIDAY and 1 and 1/2 tablets (7.5MG) daily ALL OTHER DAYS or as directed, Disp: 132 tablet, Rfl: 1  No Known Allergies  Vitals:    11/20/24 0835   BP: 122/72   Pulse: 80   Resp: 18   Temp: 97.8 °F (36.6 °C)   SpO2: 98%         /72 (BP Location: Left arm, Patient Position: Sitting, Cuff Size: Adult)   Pulse 80   Temp 97.8 °F (36.6 °C) (Temporal)   Resp 18   Ht 5' 9\" (1.753 m)   Wt 81.6 kg (180 lb)   SpO2 98%   BMI 26.58 kg/m²     General Appearance:    Alert, cooperative, no distress, appears stated age "   Head:    Normocephalic, without obvious abnormality, atraumatic   Eyes:    PERRL, conjunctiva/corneas clear, EOM's intact, fundi     benign, both eyes        Ears:    Normal TM's and external ear canals, both ears   Nose:   Nares normal, septum midline, mucosa normal, no drainage    or sinus tenderness   Throat:   Lips, mucosa, and tongue normal; teeth and gums normal   Neck:   Supple, symmetrical, trachea midline, no adenopathy;        thyroid:  No enlargement/tenderness/nodules; no carotid    bruit or JVD   Back:     Symmetric, no curvature, ROM normal, no CVA tenderness   Lungs:     Clear to auscultation bilaterally, respirations unlabored   Chest wall:    No tenderness or deformity   Heart:    Regular rate and rhythm, S1 and S2 normal, no murmur, rub    or gallop   Abdomen:     Soft, non-tender, bowel sounds active all four quadrants,     no masses, no organomegaly           Extremities:   Extremities normal, atraumatic, no cyanosis or edema   Pulses:   2+ and symmetric all extremities   Skin:   Skin color, texture, turgor normal, no rashes or lesions   Lymph nodes:   Cervical, supraclavicular, and axillary nodes normal   Neurologic:   CNII-XII intact. Normal strength, sensation and reflexes       throughout          Performance Status: ECOG/Zubrod/WHO: 0 - Asymptomatic    Labs:  Result Notes            Component  Ref Range & Units (hover) 11/19/24  5:09 PM 9/17/24  8:03 AM 4/1/24 12:43 PM 3/5/24  1:53 PM 8/28/23 10:04 AM 8/5/23  5:47 AM 8/4/23  5:15 AM   Sodium 136 137 137 137 135 137 136   Potassium 4.0 4.1 3.7 4.3 3.6 4.2 3.9   Chloride 101 102 105 102 98 105 105   CO2 27 24 26 31 30 27 25   ANION GAP 8 11 6 4 R 7 R 5 R 6 R   BUN 12 12 13 10 10 7 12   Creatinine 0.84 0.76 CM 0.88 CM 0.82 CM 0.74 CM 0.74 CM 0.83 CM   Comment: Standardized to IDMS reference method   Glucose, Fasting 101 High  143 High   102 High       Calcium 9.3 8.7 9.0 9.1 10.0 9.0 R 8.8 R   AST 26 28 16 23 19     ALT 24 27 CM 16 CM 20 CM  32 CM     Comment: Specimen collection should occur prior to Sulfasalazine administration due to the potential for falsely depressed results.   Alkaline Phosphatase 84 81 72 79 95     Total Protein 8.1 7.7 7.9 7.8 9.0 High      Albumin 4.3 4.1 4.2 4.3 5.0     Total Bilirubin 0.31 0.37 CM 0.29 CM 0.45 CM 0.38 CM     Comment: Use of this assay is not recommended for patients undergoing treatment with eltrombopag due to the potential for falsely elevated results.  N-acetyl-p-benzoquinone imine (metabolite of Acetaminophen) will generate erroneously low results in samples for patients that have taken an overdose of Acetaminophen.   eGFR 98 102 96 99 104 104 99            Result Notes             Component  Ref Range & Units (hover) 11/19/24  5:09 PM 9/17/24  8:03 AM 4/1/24 12:43 PM 8/28/23 10:04 AM 8/5/23  5:47 AM 8/4/23  5:15 AM 8/2/23  5:01 AM 8/2/23  5:01 AM   WBC 11.29 High  9.93 12.29 High  12.18 High  11.46 High  14.99 High   11.07 High    RBC 5.25 5.26 5.23 4.69 4.19 4.02  4.41   Hemoglobin 11.9 Low  11.9 Low  11.4 Low  12.8 11.8 Low  11.4 Low   12.3   Hematocrit 38.1 38.9 36.7 38.8 34.5 Low  33.1 Low   35.8 Low    MCV 73 Low  74 Low  70 Low  83 82 82  81 Low    MCH 22.7 Low  22.6 Low  21.8 Low  27.3 28.2 28.4  27.9   MCHC 31.2 Low  30.6 Low  31.1 Low  33.0 34.2 34.4  34.4   RDW 18.5 High  20.7 High  17.1 High  13.3 13.2 13.2  13.1   MPV 9.2 8.9 8.4 Low  8.5 Low  8.5 Low  8.4 Low   8.5 Low    Platelets 580 High  503 High  551 High  565 High  628 High  608 High   536 High    nRBC 0  0 0       Segmented % 50  73 57   69    Immature Grans % 0  0 0       Lymphocytes % 37  18 32   20    Monocytes % 8  7 6   5    Eosinophils Relative 4  1 4   1    Basophils Relative 1  1 1   1    Absolute Neutrophils 5.61  9.06 High  6.96   7.64 High  R    Absolute Immature Grans 0.03  0.03 0.04       Absolute Lymphocytes 4.17  2.15 3.91   2.66 R    Absolute Monocytes 0.94  0.80 0.73   0.55 R    Eosinophils Absolute 0.44  0.17 0.43    0.11 R    Basophils Absolute 0.10  0.08 0.11 High    0.11 High  R    Atypical Lymphocytes %       4 High  R    Total Counted           RBC Morphology       Normal    Platelet Estimate       Adequate R             Imaging  CT chest abdomen pelvis w contrast  Result Date: 11/8/2024  Narrative: CT CHEST, ABDOMEN AND PELVIS WITH IV CONTRAST INDICATION: K86.9: Disease of pancreas, unspecified I74.4: 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 COMPARISON: April 1, 2024 TECHNIQUE: CT examination of the chest, abdomen and pelvis was performed. Multiplanar 2D reformatted images were created from the source data. This examination, like all CT scans performed in the [facility], was performed utilizing techniques to minimize radiation dose exposure, including the use of iterative reconstruction and automated exposure control. Radiation dose length product (DLP) for this visit: 1096 mGy-cm IV Contrast: 100 mL of iohexol (OMNIPAQUE) Enteric Contrast: Not administered. FINDINGS: CHEST LUNGS: Emphysema. Patent central airways. No consolidation. No suspicious mass or nodule. PLEURA: Unremarkable. HEART/GREAT VESSELS: Heart is unremarkable for patient's age. No thoracic aortic aneurysm. MEDIASTINUM AND MELODIE: Unremarkable. CHEST WALL AND LOWER NECK: Unremarkable. ABDOMEN LIVER/BILIARY TREE: Hepatic steatosis. GALLBLADDER: Contracted. SPLEEN: Unremarkable. PANCREAS: No pancreatic mass identified. ADRENAL GLANDS: Unremarkable. KIDNEYS/URETERS: Stable mildly complex cyst upper pole right kidney. Nonobstructing right renal calculi. Otherwise unremarkable kidneys. No hydronephrosis. STOMACH AND BOWEL: Unremarkable. APPENDIX: No findings to suggest appendicitis. ABDOMINOPELVIC CAVITY: No ascites. No pneumoperitoneum. No lymphadenopathy. VESSELS: Atherosclerosis without abdominal aortic aneurysm. PELVIS REPRODUCTIVE ORGANS: Unremarkable for patient's age. URINARY BLADDER: Unremarkable. ABDOMINAL  WALL/INGUINAL REGIONS: Unremarkable. BONES: No acute fracture or suspicious osseous lesion. Old fractures of the right transverse processes at L2 and L3. Healed right posterior 10th rib fracture.     Impression: No acute findings in the chest, abdomen or pelvis. No suspicious pancreatic mass. See comments above for ancillary findings and full analysis. Workstation performed: [id number]     I reviewed the above laboratory and imaging data.      Discussion/Summary:    1 chronic thrombus distal SFA  2 pancreatic tail mass not seen on present scans of 4/1/2024  3 right adrenal mass present scan shows a 2.4 cm complex mass unchanged in the right kidney.    Repeat CT scan showed no abnormality in the spleen or the kidney   I had a lengthy discussion with the patient in the presence of his wife , I explained to them the result of the CT scan     F/U  MISAEL Hannon MD

## 2024-12-09 ENCOUNTER — VBI (OUTPATIENT)
Dept: ADMINISTRATIVE | Facility: OTHER | Age: 54
End: 2024-12-09

## 2024-12-09 NOTE — TELEPHONE ENCOUNTER
12/09/24 1:29 PM     Chart reviewed for CRC: Colonoscopy was/were not submitted to the patient's insurance.     Vanessa Jennings MA   [facility]

## 2025-01-16 DIAGNOSIS — M62.831 MUSCLE SPASM OF CALF: ICD-10-CM

## 2025-01-16 RX ORDER — METHOCARBAMOL 500 MG/1
500 TABLET, FILM COATED ORAL 3 TIMES DAILY
Qty: 90 TABLET | Refills: 0 | Status: SHIPPED | OUTPATIENT
Start: 2025-01-16

## 2025-02-07 ENCOUNTER — TELEPHONE (OUTPATIENT)
Dept: VASCULAR SURGERY | Facility: CLINIC | Age: 55
End: 2025-02-07

## 2025-02-07 DIAGNOSIS — M79.604 PAIN IN RIGHT LEG: ICD-10-CM

## 2025-02-07 DIAGNOSIS — I70.90 ARTERIAL OCCLUSION: ICD-10-CM

## 2025-02-08 RX ORDER — GABAPENTIN 300 MG/1
300 CAPSULE ORAL 3 TIMES DAILY
Qty: 90 CAPSULE | Refills: 0 | Status: SHIPPED | OUTPATIENT
Start: 2025-02-08

## 2025-02-10 RX ORDER — WARFARIN SODIUM 5 MG/1
TABLET ORAL
Qty: 132 TABLET | Refills: 0 | Status: SHIPPED | OUTPATIENT
Start: 2025-02-10

## 2025-02-11 NOTE — TELEPHONE ENCOUNTER
Pt's wife returned missed call. Pt scheduled for 1st available appointment with Dr Berry on 4/1/25 at the [place] office.  Pt's wife states they were able to get his Warfarin refilled but the pharmacy is currently out of stock and will notify them when it is available.

## 2025-03-03 DIAGNOSIS — M62.831 MUSCLE SPASM OF CALF: ICD-10-CM

## 2025-03-04 RX ORDER — METHOCARBAMOL 500 MG/1
500 TABLET, FILM COATED ORAL 3 TIMES DAILY
Qty: 90 TABLET | Refills: 0 | Status: SHIPPED | OUTPATIENT
Start: 2025-03-04

## 2025-03-15 DIAGNOSIS — I10 ESSENTIAL HYPERTENSION: ICD-10-CM

## 2025-03-17 RX ORDER — AMLODIPINE BESYLATE 5 MG/1
5 TABLET ORAL DAILY
Qty: 90 TABLET | Refills: 1 | Status: SHIPPED | OUTPATIENT
Start: 2025-03-17

## 2025-03-28 DIAGNOSIS — M79.604 PAIN IN RIGHT LEG: ICD-10-CM

## 2025-03-28 RX ORDER — GABAPENTIN 300 MG/1
300 CAPSULE ORAL 3 TIMES DAILY
Qty: 90 CAPSULE | Refills: 0 | Status: SHIPPED | OUTPATIENT
Start: 2025-03-28

## 2025-04-28 NOTE — PROGRESS NOTES
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     On warfarin and aspirin due to arterial thrombosis of unknown cause.     Follow up aretrial doppler to monitor stent patency on a yearly basis    He has quit smoking completely    He will need to get INR since he has not got one for a while.  We will also get other blood work to check on a yearly basis.  Orders:  •  Protime-INR; Future  •  CBC and differential; Future  •  Lipid panel; Future  •  Comprehensive metabolic panel; Future    Hyperlipidemia, mixed  Continue daily atorvastatin           History of Present Illness   HPI  Hector Mac is a 55 y.o. male who presents for long term follow up of right leg ischemia    Patient presents 2 year follow-up s/p RLE intervention.  History obtained from: patient    Review of Systems   Constitutional: Negative.    HENT: Negative.     Eyes: Negative.    Respiratory: Negative.     Cardiovascular: Negative.    Gastrointestinal: Negative.    Endocrine: Negative.    Genitourinary: Negative.    Musculoskeletal: Negative.    Skin: Negative.    Allergic/Immunologic: Negative.    Neurological: Negative.    Hematological: Negative.    Psychiatric/Behavioral: Negative.     I have reviewed the ROS as entered and made changes as necessary.    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) 2023   • Hypertension    • Lung nodule    • Pain in right leg 2023   •  "Prediabetes    • Tobacco dependence 10/03/2018     Past Surgical History:   Procedure Laterality Date   • IR LOWER EXTREMITY ANGIOGRAM  7/29/2023   • THROMBECTOMY W/ EMBOLECTOMY Right 7/29/2023    Procedure: RIGHT LEG BALLOON ANGIOPLASTY, STENT, FASCIOTOMY;  Surgeon: Eli Berry MD;  [facility];  Service: Vascular   • WOUND DEBRIDEMENT Right 8/3/2023    Procedure: DEBRIDEMENT LOWER EXTREMITY (WASH OUT) R LEG FASCIOTOMY WOUND WASHOUT, DEBRIDEMENT, AND POSSIBLE CLOSURE VS VAC PLACEMENT;  Surgeon: Eliot Berkowitz DO;  [facility];  Service: Vascular     Family History   Problem Relation Age of Onset   • Dementia Mother    • Cancer Father    • Lupus Brother       reports that he quit smoking about 21 months ago. His smoking use included cigarettes. He started smoking about 37 years ago. He has a 17.8 pack-year smoking history. He has never been exposed to tobacco smoke. He has never used smokeless tobacco. He reports current alcohol use of about 14.0 standard drinks of alcohol per week. He reports that he does not use drugs.  Current Outpatient Medications   Medication Instructions   • amLODIPine (NORVASC) 5 mg, Oral, Daily   • aspirin (ECOTRIN LOW STRENGTH) 81 mg, Daily   • atorvastatin (LIPITOR) 40 mg, Oral, Daily at bedtime   • gabapentin (NEURONTIN) 300 mg, Oral, 3 times daily   • methocarbamol (ROBAXIN) 500 mg, Oral, 3 times daily   • warfarin (COUMADIN) 5 mg tablet take 2 tablets (10MG) ON FRIDAY and 1 and 1/2 tablets (7.5MG) daily ALL OTHER DAYS or as directed   No Known Allergies      Objective   BP (!) 176/84 (BP Location: Right arm, Patient Position: Sitting, Cuff Size: Standard)   Pulse 91   Ht 5' 9\" (1.753 m)   Wt 80.7 kg (178 lb)   BMI 26.29 kg/m²      Physical Exam  Vitals and nursing note reviewed.   Constitutional:       Appearance: Normal appearance.   HENT:      Head: Normocephalic and atraumatic.   Cardiovascular:      Rate and Rhythm: Normal rate and regular rhythm.     "  Pulses:           Dorsalis pedis pulses are 2+ on the right side and 2+ on the left side.      Comments: Right fasciotomy incision well healed  Musculoskeletal:      Right lower leg: No edema.   Skin:     General: Skin is warm and dry.      Capillary Refill: Capillary refill takes less than 2 seconds.   Neurological:      General: No focal deficit present.      Mental Status: He is alert and oriented to person, place, and time.   Psychiatric:         Mood and Affect: Mood normal.

## 2025-04-29 ENCOUNTER — ANTICOAG VISIT (OUTPATIENT)
Dept: CARDIOLOGY CLINIC | Facility: CLINIC | Age: 55
End: 2025-04-29

## 2025-04-29 ENCOUNTER — APPOINTMENT (OUTPATIENT)
Dept: LAB | Facility: HOSPITAL | Age: 55
End: 2025-04-29
Payer: COMMERCIAL

## 2025-04-29 ENCOUNTER — OFFICE VISIT (OUTPATIENT)
Dept: VASCULAR SURGERY | Facility: CLINIC | Age: 55
End: 2025-04-29
Payer: COMMERCIAL

## 2025-04-29 ENCOUNTER — OFFICE VISIT (OUTPATIENT)
Dept: FAMILY MEDICINE CLINIC | Facility: CLINIC | Age: 55
End: 2025-04-29
Payer: COMMERCIAL

## 2025-04-29 VITALS
HEART RATE: 75 BPM | WEIGHT: 176.38 LBS | HEIGHT: 69 IN | OXYGEN SATURATION: 98 % | BODY MASS INDEX: 26.12 KG/M2 | TEMPERATURE: 98.2 F | DIASTOLIC BLOOD PRESSURE: 74 MMHG | SYSTOLIC BLOOD PRESSURE: 138 MMHG

## 2025-04-29 VITALS
BODY MASS INDEX: 26.36 KG/M2 | HEART RATE: 91 BPM | SYSTOLIC BLOOD PRESSURE: 176 MMHG | WEIGHT: 178 LBS | HEIGHT: 69 IN | DIASTOLIC BLOOD PRESSURE: 84 MMHG

## 2025-04-29 DIAGNOSIS — I74.4 EMBOLISM AND THROMBOSIS OF ARTERIES OF EXTREMITIES (HCC): ICD-10-CM

## 2025-04-29 DIAGNOSIS — I74.4 EMBOLISM AND THROMBOSIS OF ARTERIES OF EXTREMITIES (HCC): Primary | ICD-10-CM

## 2025-04-29 DIAGNOSIS — E78.2 HYPERLIPIDEMIA, MIXED: ICD-10-CM

## 2025-04-29 DIAGNOSIS — S22.31XD CLOSED FRACTURE OF ONE RIB OF RIGHT SIDE WITH ROUTINE HEALING, SUBSEQUENT ENCOUNTER: ICD-10-CM

## 2025-04-29 DIAGNOSIS — Z12.11 COLON CANCER SCREENING: ICD-10-CM

## 2025-04-29 DIAGNOSIS — S32.009A CLOSED FRACTURE OF TRANSVERSE PROCESS OF LUMBAR VERTEBRA, INITIAL ENCOUNTER (HCC): ICD-10-CM

## 2025-04-29 DIAGNOSIS — M62.831 MUSCLE SPASM OF CALF: ICD-10-CM

## 2025-04-29 DIAGNOSIS — M77.9 TENDONITIS: Primary | ICD-10-CM

## 2025-04-29 DIAGNOSIS — I70.90 ARTERIAL OCCLUSION: ICD-10-CM

## 2025-04-29 PROBLEM — M79.604 PAIN IN RIGHT LEG: Status: RESOLVED | Noted: 2023-07-19 | Resolved: 2025-04-29

## 2025-04-29 PROBLEM — D62 ACUTE BLOOD LOSS ANEMIA: Status: RESOLVED | Noted: 2023-08-02 | Resolved: 2025-04-29

## 2025-04-29 LAB
BASOPHILS # BLD AUTO: 0.1 THOUSANDS/ÂΜL (ref 0–0.1)
BASOPHILS NFR BLD AUTO: 1 % (ref 0–1)
EOSINOPHIL # BLD AUTO: 0.27 THOUSAND/ÂΜL (ref 0–0.61)
EOSINOPHIL NFR BLD AUTO: 3 % (ref 0–6)
ERYTHROCYTE [DISTWIDTH] IN BLOOD BY AUTOMATED COUNT: 17.7 % (ref 11.6–15.1)
HCT VFR BLD AUTO: 38.5 % (ref 36.5–49.3)
HGB BLD-MCNC: 12.1 G/DL (ref 12–17)
IMM GRANULOCYTES # BLD AUTO: 0.02 THOUSAND/UL (ref 0–0.2)
IMM GRANULOCYTES NFR BLD AUTO: 0 % (ref 0–2)
INR PPP: 1.8 (ref 0.85–1.19)
LYMPHOCYTES # BLD AUTO: 3.07 THOUSANDS/ÂΜL (ref 0.6–4.47)
LYMPHOCYTES NFR BLD AUTO: 33 % (ref 14–44)
MCH RBC QN AUTO: 22.5 PG (ref 26.8–34.3)
MCHC RBC AUTO-ENTMCNC: 31.4 G/DL (ref 31.4–37.4)
MCV RBC AUTO: 72 FL (ref 82–98)
MONOCYTES # BLD AUTO: 0.72 THOUSAND/ÂΜL (ref 0.17–1.22)
MONOCYTES NFR BLD AUTO: 8 % (ref 4–12)
NEUTROPHILS # BLD AUTO: 5.13 THOUSANDS/ÂΜL (ref 1.85–7.62)
NEUTS SEG NFR BLD AUTO: 55 % (ref 43–75)
NRBC BLD AUTO-RTO: 0 /100 WBCS
PLATELET # BLD AUTO: 458 THOUSANDS/UL (ref 149–390)
PMV BLD AUTO: 8.7 FL (ref 8.9–12.7)
PROTHROMBIN TIME: 21.6 SECONDS (ref 12.3–15)
RBC # BLD AUTO: 5.38 MILLION/UL (ref 3.88–5.62)
WBC # BLD AUTO: 9.31 THOUSAND/UL (ref 4.31–10.16)

## 2025-04-29 PROCEDURE — 36415 COLL VENOUS BLD VENIPUNCTURE: CPT

## 2025-04-29 PROCEDURE — 85025 COMPLETE CBC W/AUTO DIFF WBC: CPT

## 2025-04-29 PROCEDURE — 99213 OFFICE O/P EST LOW 20 MIN: CPT | Performed by: STUDENT IN AN ORGANIZED HEALTH CARE EDUCATION/TRAINING PROGRAM

## 2025-04-29 PROCEDURE — 99213 OFFICE O/P EST LOW 20 MIN: CPT | Performed by: SURGERY

## 2025-04-29 PROCEDURE — 85610 PROTHROMBIN TIME: CPT

## 2025-04-29 RX ORDER — METHOCARBAMOL 500 MG/1
500 TABLET, FILM COATED ORAL 3 TIMES DAILY
Qty: 90 TABLET | Refills: 0 | Status: SHIPPED | OUTPATIENT
Start: 2025-04-29

## 2025-04-29 NOTE — PROGRESS NOTES
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       Closed fracture of transverse process of lumbar vertebra, initial encounter ([facility])  Pain currently controlled, patient currently back to work.       Colon cancer screening    Orders:  •  iFOBT (FIT); Future    Tendonitis  Patient is left-hand dominant, does report left shoulder pain.  Does work physically demanding job and uses hands repetitively.  Recommend conservative management with Voltaren gel and ice/heat.       Closed fracture of one rib of right side with routine healing, subsequent encounter  Symptoms currently resolved.              History of Present Illness     Patient presents today for routine follow-up.      Review of Systems   Constitutional:  Negative for chills and fever.   HENT:  Negative for ear pain and sore throat.    Eyes:  Negative for pain and visual disturbance.   Respiratory:  Negative for cough and shortness of breath.    Cardiovascular:  Negative for chest pain and palpitations.   Gastrointestinal:  Negative for abdominal pain and vomiting.   Genitourinary:  Negative for dysuria and hematuria.   Musculoskeletal:  Negative for arthralgias and back pain.        Left shoulder pain   Skin:  Negative for color change and rash.   Neurological:  Negative for seizures and syncope.   All other systems reviewed and are negative.      Objective   /90 (BP Location: Left arm, Patient Position: Sitting, Cuff Size: Adult)   Pulse 75   Temp 98.2 °F (36.8 °C) (Temporal)   Ht 5' 9\" (1.753 m)   Wt 80 kg (176 lb 6 oz)   SpO2 " 98%   BMI 26.05 kg/m²      Physical Exam  Vitals reviewed.   Constitutional:       General: He is not in acute distress.     Appearance: Normal appearance. He is well-developed.   HENT:      Head: Normocephalic and atraumatic.   Eyes:      Extraocular Movements: Extraocular movements intact.      Conjunctiva/sclera: Conjunctivae normal.   Cardiovascular:      Rate and Rhythm: Normal rate and regular rhythm.      Heart sounds: Normal heart sounds. No murmur heard.  Pulmonary:      Effort: Pulmonary effort is normal. No respiratory distress.      Breath sounds: Normal breath sounds.   Musculoskeletal:         General: Tenderness present. No swelling or deformity.      Comments: Point tenderness at anterior shoulder , full ROM with shoulder extension and rotation   Skin:     General: Skin is warm and dry.   Neurological:      General: No focal deficit present.      Mental Status: He is alert and oriented to person, place, and time.   Psychiatric:         Mood and Affect: Mood normal.         Behavior: Behavior normal.

## 2025-04-29 NOTE — ASSESSMENT & PLAN NOTE
RIGHT leg acute ischemia due to occlusion of the distal SFA and popliteal artery, S/p R SFA cutdown, balloon angioplasty & stenting 7-29-23  as well as R leg fasciotomy with closure on 8/3/23      Right leg numbness  persists, he is on long term gabapentin.     On warfarin and aspirin due to arterial thrombosis of unknown cause.     Follow up aretrial doppler to monitor stent patency on a yearly basis    He has quit smoking completely    He will need to get INR since he has not got one for a while.  We will also get other blood work to check on a yearly basis.  Orders:  •  Protime-INR; Future  •  CBC and differential; Future  •  Lipid panel; Future  •  Comprehensive metabolic panel; Future

## 2025-04-29 NOTE — PATIENT INSTRUCTIONS
RIGHT leg acute ischemia due to occlusion of the distal SFA and popliteal artery, S/p R SFA cutdown, balloon angioplasty & stenting 7-29-23  as well as R leg fasciotomy with closure on 8/3/23      Right leg numbness  persists, he is on long term gabapentin.     On warfarin and aspirin due to arterial thrombosis of unknown cause.     Follow up aretrial doppler to monitor stent patency on a yearly basis    He has quit smoking completely which is fantastic    He will need to get INR since he has not got one for a while.  We will also get other blood work to check on a yearly basis.    You will need to get INR testing for warfarin numbers every 4-8 weeks to make it is not getting too high or too low.

## 2025-04-29 NOTE — ASSESSMENT & PLAN NOTE
Patient is left-hand dominant, does report left shoulder pain.  Does work physically demanding job and uses hands repetitively.  Recommend conservative management with Voltaren gel and ice/heat.      Calm/Appropriate

## 2025-05-03 ENCOUNTER — APPOINTMENT (OUTPATIENT)
Dept: LAB | Facility: HOSPITAL | Age: 55
End: 2025-05-03
Payer: COMMERCIAL

## 2025-05-03 DIAGNOSIS — R73.03 PREDIABETES: ICD-10-CM

## 2025-05-03 DIAGNOSIS — I70.90 ARTERIAL OCCLUSION: ICD-10-CM

## 2025-05-03 DIAGNOSIS — I74.4 EMBOLISM AND THROMBOSIS OF ARTERIES OF EXTREMITIES (HCC): ICD-10-CM

## 2025-05-03 LAB
ALBUMIN SERPL BCG-MCNC: 4.1 G/DL (ref 3.5–5)
ALP SERPL-CCNC: 79 U/L (ref 34–104)
ALT SERPL W P-5'-P-CCNC: 26 U/L (ref 7–52)
ANION GAP SERPL CALCULATED.3IONS-SCNC: 6 MMOL/L (ref 4–13)
AST SERPL W P-5'-P-CCNC: 25 U/L (ref 13–39)
BILIRUB SERPL-MCNC: 0.42 MG/DL (ref 0.2–1)
BUN SERPL-MCNC: 10 MG/DL (ref 5–25)
CALCIUM SERPL-MCNC: 8.9 MG/DL (ref 8.4–10.2)
CHLORIDE SERPL-SCNC: 105 MMOL/L (ref 96–108)
CHOLEST SERPL-MCNC: 172 MG/DL (ref ?–200)
CO2 SERPL-SCNC: 29 MMOL/L (ref 21–32)
CREAT SERPL-MCNC: 0.89 MG/DL (ref 0.6–1.3)
GFR SERPL CREATININE-BSD FRML MDRD: 95 ML/MIN/1.73SQ M
GLUCOSE P FAST SERPL-MCNC: 113 MG/DL (ref 65–99)
HDLC SERPL-MCNC: 44 MG/DL
INR PPP: 1.11 (ref 0.85–1.19)
LDLC SERPL CALC-MCNC: 85 MG/DL (ref 0–100)
NONHDLC SERPL-MCNC: 128 MG/DL
POTASSIUM SERPL-SCNC: 4.2 MMOL/L (ref 3.5–5.3)
PROT SERPL-MCNC: 7.5 G/DL (ref 6.4–8.4)
PROTHROMBIN TIME: 15 SECONDS (ref 12.3–15)
SODIUM SERPL-SCNC: 140 MMOL/L (ref 135–147)
TRIGL SERPL-MCNC: 215 MG/DL (ref ?–150)

## 2025-05-03 PROCEDURE — 80061 LIPID PANEL: CPT

## 2025-05-03 PROCEDURE — 36415 COLL VENOUS BLD VENIPUNCTURE: CPT

## 2025-05-03 PROCEDURE — 83036 HEMOGLOBIN GLYCOSYLATED A1C: CPT

## 2025-05-03 PROCEDURE — 80053 COMPREHEN METABOLIC PANEL: CPT

## 2025-05-03 PROCEDURE — 85610 PROTHROMBIN TIME: CPT

## 2025-05-04 LAB
EST. AVERAGE GLUCOSE BLD GHB EST-MCNC: 143 MG/DL
HBA1C MFR BLD: 6.6 %

## 2025-05-05 ENCOUNTER — ANTICOAG VISIT (OUTPATIENT)
Dept: CARDIOLOGY CLINIC | Facility: CLINIC | Age: 55
End: 2025-05-05

## 2025-05-05 DIAGNOSIS — I74.4 EMBOLISM AND THROMBOSIS OF ARTERIES OF EXTREMITIES (HCC): Primary | ICD-10-CM

## 2025-05-06 ENCOUNTER — RESULTS FOLLOW-UP (OUTPATIENT)
Dept: FAMILY MEDICINE CLINIC | Facility: CLINIC | Age: 55
End: 2025-05-06

## 2025-05-06 NOTE — TELEPHONE ENCOUNTER
Called patient to schedule an appointment to review abnormal labs. Patient's wife will have the patient call tomorrow to schedule.

## 2025-05-06 NOTE — TELEPHONE ENCOUNTER
----- Message from Keely Ramirez DO sent at 5/6/2025  1:23 PM EDT -----  Please call to schedule patient an appointment to review abnormal labs, can be done virtually if he has the capability.

## 2025-05-14 DIAGNOSIS — M79.604 PAIN IN RIGHT LEG: ICD-10-CM

## 2025-05-14 DIAGNOSIS — I70.90 ARTERIAL OCCLUSION: ICD-10-CM

## 2025-05-14 NOTE — TELEPHONE ENCOUNTER
Good afternoon, I wanted to check if this patient needs coumadin adjustment? His INR was subtherapeutic at last lab work blood check. Thank you.

## 2025-05-15 RX ORDER — WARFARIN SODIUM 5 MG/1
TABLET ORAL
Qty: 132 TABLET | Refills: 0 | Status: SHIPPED | OUTPATIENT
Start: 2025-05-15

## 2025-05-15 NOTE — TELEPHONE ENCOUNTER
Will refill as requested to not have patient run out of medication however if new or additional dosing is necessary.  Please send new request.

## 2025-06-19 ENCOUNTER — OFFICE VISIT (OUTPATIENT)
Dept: FAMILY MEDICINE CLINIC | Facility: CLINIC | Age: 55
End: 2025-06-19
Payer: COMMERCIAL

## 2025-06-19 VITALS
HEART RATE: 74 BPM | SYSTOLIC BLOOD PRESSURE: 128 MMHG | RESPIRATION RATE: 18 BRPM | HEIGHT: 69 IN | WEIGHT: 175 LBS | BODY MASS INDEX: 25.92 KG/M2 | OXYGEN SATURATION: 98 % | DIASTOLIC BLOOD PRESSURE: 80 MMHG

## 2025-06-19 DIAGNOSIS — G62.9 NEUROPATHY: ICD-10-CM

## 2025-06-19 DIAGNOSIS — R73.03 PREDIABETES: ICD-10-CM

## 2025-06-19 DIAGNOSIS — E78.1 HYPERTRIGLYCERIDEMIA: Primary | ICD-10-CM

## 2025-06-19 PROCEDURE — 99214 OFFICE O/P EST MOD 30 MIN: CPT | Performed by: STUDENT IN AN ORGANIZED HEALTH CARE EDUCATION/TRAINING PROGRAM

## 2025-06-19 RX ORDER — GABAPENTIN 300 MG/1
300 CAPSULE ORAL 3 TIMES DAILY
Qty: 90 CAPSULE | Refills: 0 | Status: SHIPPED | OUTPATIENT
Start: 2025-06-19

## 2025-06-19 RX ORDER — FENOFIBRATE 54 MG/1
54 TABLET ORAL DAILY
Qty: 90 TABLET | Refills: 1 | Status: SHIPPED | OUTPATIENT
Start: 2025-06-19

## 2025-06-19 NOTE — ASSESSMENT & PLAN NOTE
Triglyceride levels > 200, given hx and new diabetes. Will continue Lipitor and add Tricor 54mg daily. Will recheck lipid panel in 3 months  Orders:  •  fenofibrate (TRICOR) 54 MG tablet; Take 1 tablet (54 mg total) by mouth daily

## 2025-06-19 NOTE — ASSESSMENT & PLAN NOTE
Orders:  •  gabapentin (NEURONTIN) 300 mg capsule; Take 1 capsule (300 mg total) by mouth 3 (three) times a day

## 2025-06-19 NOTE — PROGRESS NOTES
"Name: Hector Mac      : 1970      MRN: [id number]  Encounter Provider: Keely Ramirez DO  Encounter Date: 2025   Encounter department: [facility] PR[facility]  :  Assessment & Plan  Hypertriglyceridemia  Triglyceride levels > 200, given hx and new diabetes. Will continue Lipitor and add Tricor 54mg daily. Will recheck lipid panel in 3 months  Orders:  •  fenofibrate (TRICOR) 54 MG tablet; Take 1 tablet (54 mg total) by mouth daily    Prediabetes  A1c increased to 6.6  Patient does endorse carbohydrate heavy diet, also drinks a lot of hot tea with sugar daily  I have advised dietary caution , patient should decrease sugar intake as well as carb intake. Should increase water, lean protein and vegetables. Patient to return in 3 months, if at that time A1c still elevated or increasing will start Metformin 500mg BID.       Neuropathy  Okay to continue gabapentin 300 mg 3 times daily, refill sent to pharmacy.  Orders:  •  gabapentin (NEURONTIN) 300 mg capsule; Take 1 capsule (300 mg total) by mouth 3 (three) times a day           History of Present Illness   Patient presents today to review abnormal labs.       Review of Systems   Constitutional:  Negative for chills and fever.   HENT:  Negative for congestion and rhinorrhea.    Respiratory:  Negative for cough and shortness of breath.    Cardiovascular:  Negative for chest pain and palpitations.   Gastrointestinal:  Negative for abdominal pain and constipation.   Neurological:  Negative for dizziness and headaches.       Objective   /80 (BP Location: Left arm, Patient Position: Sitting, Cuff Size: Standard)   Pulse 74   Resp 18   Ht 5' 9\" (1.753 m)   Wt 79.4 kg (175 lb)   SpO2 98%   BMI 25.84 kg/m²      Physical Exam  Vitals reviewed.   Constitutional:       Appearance: Normal appearance.   HENT:      Head: Normocephalic and atraumatic.     Cardiovascular:      Rate and Rhythm: Normal rate and regular rhythm.      " Heart sounds: Normal heart sounds.   Pulmonary:      Effort: Pulmonary effort is normal.      Breath sounds: Normal breath sounds.     Neurological:      General: No focal deficit present.      Mental Status: He is alert and oriented to person, place, and time.     Psychiatric:         Mood and Affect: Mood normal.         Behavior: Behavior normal.

## 2025-06-19 NOTE — ASSESSMENT & PLAN NOTE
A1c increased to 6.6  Patient does endorse carbohydrate heavy diet, also drinks a lot of hot tea with sugar daily  I have advised dietary caution , patient should decrease sugar intake as well as carb intake. Should increase water, lean protein and vegetables. Patient to return in 3 months, if at that time A1c still elevated or increasing will start Metformin 500mg BID.

## 2025-06-19 NOTE — ASSESSMENT & PLAN NOTE
Okay to continue gabapentin 300 mg 3 times daily, refill sent to pharmacy.  Orders:  •  gabapentin (NEURONTIN) 300 mg capsule; Take 1 capsule (300 mg total) by mouth 3 (three) times a day

## 2025-07-08 DIAGNOSIS — M62.831 MUSCLE SPASM OF CALF: ICD-10-CM

## 2025-07-09 ENCOUNTER — APPOINTMENT (OUTPATIENT)
Dept: LAB | Facility: CLINIC | Age: 55
End: 2025-07-09
Payer: COMMERCIAL

## 2025-07-09 DIAGNOSIS — Z12.11 COLON CANCER SCREENING: ICD-10-CM

## 2025-07-09 LAB — HEMOCCULT STL QL IA: NEGATIVE

## 2025-07-09 PROCEDURE — G0328 FECAL BLOOD SCRN IMMUNOASSAY: HCPCS

## 2025-07-10 RX ORDER — METHOCARBAMOL 500 MG/1
500 TABLET, FILM COATED ORAL 3 TIMES DAILY
Qty: 90 TABLET | Refills: 0 | Status: SHIPPED | OUTPATIENT
Start: 2025-07-10

## 2025-07-26 ENCOUNTER — APPOINTMENT (OUTPATIENT)
Dept: RADIOLOGY | Facility: CLINIC | Age: 55
End: 2025-07-26
Attending: FAMILY MEDICINE
Payer: COMMERCIAL

## 2025-07-26 VITALS — HEIGHT: 69 IN | WEIGHT: 175 LBS | BODY MASS INDEX: 25.92 KG/M2

## 2025-07-26 DIAGNOSIS — M77.12 LATERAL EPICONDYLITIS OF LEFT ELBOW: Primary | ICD-10-CM

## 2025-07-26 DIAGNOSIS — M25.522 PAIN IN LEFT ELBOW: ICD-10-CM

## 2025-07-26 DIAGNOSIS — M25.512 ACUTE PAIN OF LEFT SHOULDER: ICD-10-CM

## 2025-07-26 PROCEDURE — 73030 X-RAY EXAM OF SHOULDER: CPT

## 2025-07-26 PROCEDURE — 99214 OFFICE O/P EST MOD 30 MIN: CPT | Performed by: FAMILY MEDICINE

## 2025-07-26 PROCEDURE — 73080 X-RAY EXAM OF ELBOW: CPT

## 2025-07-26 RX ORDER — METHYLPREDNISOLONE 4 MG/1
TABLET ORAL
Qty: 1 EACH | Refills: 0 | Status: SHIPPED | OUTPATIENT
Start: 2025-07-26

## (undated) DEVICE — SUT MONOCRYL 3-0 SH 27 IN Y416H

## (undated) DEVICE — Device

## (undated) DEVICE — GAUZE SPONGES,16 PLY: Brand: CURITY

## (undated) DEVICE — SUT SILK 4-0 18 IN A183H

## (undated) DEVICE — SUT ETHILON 2-0 FSLX 30 IN 1674H

## (undated) DEVICE — ABDOMINAL PAD: Brand: DERMACEA

## (undated) DEVICE — ELECTRODE BLADE MOD E-Z CLEAN 2.5IN 6.4CM -0012M

## (undated) DEVICE — PACK UNIVERSAL DRAPES SUB-Q ICD

## (undated) DEVICE — TRAY FOLEY 16FR URIMETER SURESTEP

## (undated) DEVICE — DECANTER: Brand: UNBRANDED

## (undated) DEVICE — ACE WRAP 4 IN UNSTERILE

## (undated) DEVICE — GUIDEWIRE WITH ICE™ HYDROPHILIC COATING: Brand: V-18™ CONTROL WIRE™

## (undated) DEVICE — PENCIL ELECTROSURG E-Z CLEAN -0035H

## (undated) DEVICE — MEDI-VAC YANK SUCT HNDL W/TPRD BULBOUS TIP: Brand: CARDINAL HEALTH

## (undated) DEVICE — PAD GROUNDING ADULT

## (undated) DEVICE — SYRINGE 20ML LL

## (undated) DEVICE — SURGICEL FIBRILLAR 1 X 2

## (undated) DEVICE — INTENDED FOR TISSUE SEPARATION, AND OTHER PROCEDURES THAT REQUIRE A SHARP SURGICAL BLADE TO PUNCTURE OR CUT.: Brand: BARD-PARKER SAFETY BLADES SIZE 15, STERILE

## (undated) DEVICE — KERLIX BANDAGE ROLL: Brand: KERLIX

## (undated) DEVICE — PETRI DISH STERILE

## (undated) DEVICE — ADHESIVE SKIN HIGH VISCOSITY EXOFIN 1ML

## (undated) DEVICE — 4 F TEMPO AQUA 0.038  65CM VER: Brand: TEMPO AQUA

## (undated) DEVICE — GLOVE SRG BIOGEL ECLIPSE 7.5

## (undated) DEVICE — STERILE BETHLEHEM FEM POP PACK: Brand: CARDINAL HEALTH

## (undated) DEVICE — PRESTO™ INFLATION DEVICE: Brand: PRESTO

## (undated) DEVICE — CATH BAL STERLING OTW 5 X 40MM X 135CM

## (undated) DEVICE — DRAPE SURGIKIT SADDLE BAG

## (undated) DEVICE — GLOVE INDICATOR PI UNDERGLOVE SZ 8 BLUE

## (undated) DEVICE — GLOVE SRG BIOGEL 7.5

## (undated) DEVICE — SUT PROLENE 6-0 BV130 30 IN 8709H

## (undated) DEVICE — BETHLEHEM UNIVERSAL MINOR GEN: Brand: CARDINAL HEALTH

## (undated) DEVICE — SUT MONOCRYL 4-0 PS-2 18 IN Y496G

## (undated) DEVICE — ACE WRAP 6 IN STERILE

## (undated) DEVICE — TUBING SUCTION 5MM X 12 FT

## (undated) DEVICE — 40601 PROLONGED POSITIONING SYSTEM: Brand: 40601 PROLONGED POSITIONING SYSTEM

## (undated) DEVICE — STOCKINETTE 6 IN COTTON NS 1 PLY

## (undated) DEVICE — 3M™ STERI-STRIP™ REINFORCED ADHESIVE SKIN CLOSURES, R1547, 1/2 IN X 4 IN (12 MM X 100 MM), 6 STRIPS/ENVELOPE: Brand: 3M™ STERI-STRIP™

## (undated) DEVICE — SUT SILK 2-0 18 IN A185H

## (undated) DEVICE — ACE WRAP 6 IN UNSTERILE